# Patient Record
Sex: MALE | Race: WHITE | NOT HISPANIC OR LATINO | Employment: OTHER | URBAN - METROPOLITAN AREA
[De-identification: names, ages, dates, MRNs, and addresses within clinical notes are randomized per-mention and may not be internally consistent; named-entity substitution may affect disease eponyms.]

---

## 2017-02-06 ENCOUNTER — HISTORICAL (OUTPATIENT)
Dept: LAB | Facility: HOSPITAL | Age: 57
End: 2017-02-06

## 2017-05-08 ENCOUNTER — HISTORICAL (OUTPATIENT)
Dept: LAB | Facility: HOSPITAL | Age: 57
End: 2017-05-08

## 2017-05-08 LAB
ALBUMIN SERPL-MCNC: 4 GM/DL (ref 3.4–5)
ALBUMIN/GLOB SERPL: 1.2 RATIO (ref 1.1–2)
ALP SERPL-CCNC: 70 UNIT/L (ref 50–136)
ALT SERPL-CCNC: 27 UNIT/L (ref 12–78)
AST SERPL-CCNC: 26 UNIT/L (ref 10–37)
BILIRUB SERPL-MCNC: 0.4 MG/DL (ref 0.2–1)
BILIRUBIN DIRECT+TOT PNL SERPL-MCNC: 0.12 MG/DL (ref 0.05–0.2)
BILIRUBIN DIRECT+TOT PNL SERPL-MCNC: 0.28 MG/DL
BUN SERPL-MCNC: 16 MG/DL (ref 7–18)
CALCIUM SERPL-MCNC: 9.4 MG/DL (ref 8.5–10.1)
CHLORIDE SERPL-SCNC: 106 MMOL/L (ref 98–107)
CO2 SERPL-SCNC: 25.4 MMOL/L (ref 21–32)
CREAT SERPL-MCNC: 1.39 MG/DL (ref 0.7–1.3)
ERYTHROCYTE [SEDIMENTATION RATE] IN BLOOD: 15 MM/HR (ref 0–15)
GLOBULIN SER-MCNC: 3.4 GM/DL (ref 2.4–3.5)
GLUCOSE SERPL-MCNC: 152 MG/DL (ref 74–106)
POTASSIUM SERPL-SCNC: 5 MMOL/L (ref 3.5–5.1)
PROT SERPL-MCNC: 7.4 GM/DL (ref 6.4–8.2)
SODIUM SERPL-SCNC: 140 MMOL/L (ref 136–145)

## 2017-05-12 ENCOUNTER — HISTORICAL (OUTPATIENT)
Dept: LAB | Facility: HOSPITAL | Age: 57
End: 2017-05-12

## 2017-05-12 LAB
ALBUMIN SERPL-MCNC: 3.8 GM/DL (ref 3.4–5)
ALP SERPL-CCNC: 75 UNIT/L (ref 50–136)
ALT SERPL-CCNC: 25 UNIT/L (ref 12–78)
AST SERPL-CCNC: 24 UNIT/L (ref 10–37)
BILIRUB SERPL-MCNC: 0.3 MG/DL (ref 0.2–1)
BILIRUBIN DIRECT+TOT PNL SERPL-MCNC: 0.14 MG/DL (ref 0.05–0.2)
BILIRUBIN DIRECT+TOT PNL SERPL-MCNC: 0.16 MG/DL
CHOLEST SERPL-MCNC: 114 MG/DL (ref 50–200)
CHOLEST/HDLC SERPL: 4 {RATIO} (ref 0–5)
HDLC SERPL-MCNC: 31 MG/DL (ref 35–60)
LDLC SERPL CALC-MCNC: 46 MG/DL (ref 50–140)
PROT SERPL-MCNC: 7.1 GM/DL (ref 6.4–8.2)
TRIGL SERPL-MCNC: 187 MG/DL (ref 30–150)
VLDLC SERPL CALC-MCNC: 37 MG/DL

## 2017-05-19 ENCOUNTER — HISTORICAL (OUTPATIENT)
Dept: LAB | Facility: HOSPITAL | Age: 57
End: 2017-05-19

## 2017-05-19 LAB
ALBUMIN SERPL-MCNC: 3.8 GM/DL (ref 3.4–5)
ALBUMIN/GLOB SERPL: 1.1 RATIO (ref 1.1–2)
ALP SERPL-CCNC: 81 UNIT/L (ref 50–136)
ALT SERPL-CCNC: 26 UNIT/L (ref 12–78)
AST SERPL-CCNC: 24 UNIT/L (ref 10–37)
BILIRUB SERPL-MCNC: 0.2 MG/DL (ref 0.2–1)
BILIRUBIN DIRECT+TOT PNL SERPL-MCNC: 0.09 MG/DL
BILIRUBIN DIRECT+TOT PNL SERPL-MCNC: 0.11 MG/DL (ref 0.05–0.2)
BUN SERPL-MCNC: 14 MG/DL (ref 7–18)
CALCIUM SERPL-MCNC: 9.1 MG/DL (ref 8.5–10.1)
CHLORIDE SERPL-SCNC: 105 MMOL/L (ref 98–107)
CHOLEST SERPL-MCNC: 132 MG/DL (ref 50–200)
CHOLEST/HDLC SERPL: 4 {RATIO} (ref 0–5)
CO2 SERPL-SCNC: 27.7 MMOL/L (ref 21–32)
CREAT SERPL-MCNC: 1.11 MG/DL (ref 0.7–1.3)
EST. AVERAGE GLUCOSE BLD GHB EST-MCNC: 134 MG/DL
GLOBULIN SER-MCNC: 3.4 GM/DL (ref 2.4–3.5)
GLUCOSE SERPL-MCNC: 121 MG/DL (ref 74–106)
HBA1C MFR BLD: 6.3 % (ref 4.5–6.2)
HDLC SERPL-MCNC: 36 MG/DL (ref 35–60)
LDLC SERPL CALC-MCNC: 54 MG/DL (ref 50–140)
POTASSIUM SERPL-SCNC: 4.5 MMOL/L (ref 3.5–5.1)
PROT SERPL-MCNC: 7.2 GM/DL (ref 6.4–8.2)
SODIUM SERPL-SCNC: 140 MMOL/L (ref 136–145)
TRIGL SERPL-MCNC: 208 MG/DL (ref 30–150)
VLDLC SERPL CALC-MCNC: 42 MG/DL

## 2017-08-07 ENCOUNTER — HISTORICAL (OUTPATIENT)
Dept: LAB | Facility: HOSPITAL | Age: 57
End: 2017-08-07

## 2017-08-07 LAB
ALBUMIN SERPL-MCNC: 3.7 GM/DL (ref 3.4–5)
ALBUMIN/GLOB SERPL: 1.2 RATIO (ref 1.1–2)
ALP SERPL-CCNC: 69 UNIT/L (ref 50–136)
ALT SERPL-CCNC: 30 UNIT/L (ref 12–78)
AST SERPL-CCNC: 17 UNIT/L (ref 10–37)
BILIRUB SERPL-MCNC: 0.3 MG/DL (ref 0.2–1)
BILIRUBIN DIRECT+TOT PNL SERPL-MCNC: 0.1 MG/DL (ref 0.05–0.2)
BILIRUBIN DIRECT+TOT PNL SERPL-MCNC: 0.2 MG/DL
BUN SERPL-MCNC: 20 MG/DL (ref 7–18)
CALCIUM SERPL-MCNC: 9.1 MG/DL (ref 8.5–10.1)
CHLORIDE SERPL-SCNC: 102 MMOL/L (ref 98–107)
CO2 SERPL-SCNC: 25.1 MMOL/L (ref 21–32)
CREAT SERPL-MCNC: 1.45 MG/DL (ref 0.7–1.3)
ERYTHROCYTE [SEDIMENTATION RATE] IN BLOOD: 12 MM/HR (ref 0–15)
GLOBULIN SER-MCNC: 3.2 GM/DL (ref 2.4–3.5)
GLUCOSE SERPL-MCNC: 119 MG/DL (ref 74–106)
POTASSIUM SERPL-SCNC: 4.6 MMOL/L (ref 3.5–5.1)
PROT SERPL-MCNC: 6.9 GM/DL (ref 6.4–8.2)
SODIUM SERPL-SCNC: 139 MMOL/L (ref 136–145)

## 2017-11-06 ENCOUNTER — HISTORICAL (OUTPATIENT)
Dept: LAB | Facility: HOSPITAL | Age: 57
End: 2017-11-06

## 2017-11-06 LAB
ALBUMIN SERPL-MCNC: 3.9 GM/DL (ref 3.4–5)
ALBUMIN/GLOB SERPL: 1.3 RATIO (ref 1.1–2)
ALP SERPL-CCNC: 62 UNIT/L (ref 50–136)
ALT SERPL-CCNC: 25 UNIT/L (ref 12–78)
AST SERPL-CCNC: 24 UNIT/L (ref 10–37)
BILIRUB SERPL-MCNC: 0.3 MG/DL (ref 0.2–1)
BILIRUBIN DIRECT+TOT PNL SERPL-MCNC: 0.07 MG/DL (ref 0.05–0.2)
BILIRUBIN DIRECT+TOT PNL SERPL-MCNC: 0.19 MG/DL
BUN SERPL-MCNC: 17 MG/DL (ref 7–18)
CALCIUM SERPL-MCNC: 8.7 MG/DL (ref 8.5–10.1)
CHLORIDE SERPL-SCNC: 105 MMOL/L (ref 98–107)
CO2 SERPL-SCNC: 25.5 MMOL/L (ref 21–32)
CREAT SERPL-MCNC: 1.2 MG/DL (ref 0.7–1.3)
ERYTHROCYTE [SEDIMENTATION RATE] IN BLOOD: 13 MM/HR (ref 0–15)
GLOBULIN SER-MCNC: 3.1 GM/DL (ref 2.4–3.5)
GLUCOSE SERPL-MCNC: 110 MG/DL (ref 74–106)
POTASSIUM SERPL-SCNC: 5 MMOL/L (ref 3.5–5.1)
PROT SERPL-MCNC: 7 GM/DL (ref 6.4–8.2)
SODIUM SERPL-SCNC: 140 MMOL/L (ref 136–145)

## 2018-02-05 ENCOUNTER — HISTORICAL (OUTPATIENT)
Dept: LAB | Facility: HOSPITAL | Age: 58
End: 2018-02-05

## 2018-02-05 LAB
ALBUMIN SERPL-MCNC: 3.8 GM/DL (ref 3.4–5)
ALBUMIN/GLOB SERPL: 1.1 RATIO (ref 1.1–2)
ALP SERPL-CCNC: 65 UNIT/L (ref 46–116)
ALT SERPL-CCNC: 25 UNIT/L (ref 12–78)
AST SERPL-CCNC: 21 UNIT/L (ref 10–37)
BILIRUB SERPL-MCNC: 0.2 MG/DL (ref 0.2–1)
BILIRUBIN DIRECT+TOT PNL SERPL-MCNC: 0.08 MG/DL (ref 0–0.2)
BILIRUBIN DIRECT+TOT PNL SERPL-MCNC: 0.16 MG/DL
BUN SERPL-MCNC: 16 MG/DL (ref 7–18)
CALCIUM SERPL-MCNC: 9.4 MG/DL (ref 8.5–10.1)
CHLORIDE SERPL-SCNC: 102 MMOL/L (ref 98–107)
CO2 SERPL-SCNC: 27.2 MMOL/L (ref 21–32)
CREAT SERPL-MCNC: 1.24 MG/DL (ref 0.7–1.3)
CRP SERPL-MCNC: <0.3 MG/DL
ERYTHROCYTE [SEDIMENTATION RATE] IN BLOOD: 15 MM/HR (ref 0–15)
GLOBULIN SER-MCNC: 3.4 GM/DL (ref 2.4–3.5)
GLUCOSE SERPL-MCNC: 127 MG/DL (ref 74–106)
POTASSIUM SERPL-SCNC: 5 MMOL/L (ref 3.5–5.1)
PROT SERPL-MCNC: 7.2 GM/DL (ref 6.4–8.2)
SODIUM SERPL-SCNC: 138 MMOL/L (ref 136–145)

## 2018-05-07 ENCOUNTER — HISTORICAL (OUTPATIENT)
Dept: LAB | Facility: HOSPITAL | Age: 58
End: 2018-05-07

## 2018-05-07 LAB
ALBUMIN SERPL-MCNC: 3.6 GM/DL (ref 3.4–5)
ALBUMIN/GLOB SERPL: 1 RATIO (ref 1.1–2)
ALP SERPL-CCNC: 63 UNIT/L (ref 46–116)
ALT SERPL-CCNC: 28 UNIT/L (ref 12–78)
AST SERPL-CCNC: 18 UNIT/L (ref 10–37)
BILIRUB SERPL-MCNC: 0.4 MG/DL (ref 0.2–1)
BILIRUBIN DIRECT+TOT PNL SERPL-MCNC: 0.09 MG/DL (ref 0–0.2)
BILIRUBIN DIRECT+TOT PNL SERPL-MCNC: 0.31 MG/DL
BUN SERPL-MCNC: 18 MG/DL (ref 7–18)
CALCIUM SERPL-MCNC: 9.2 MG/DL (ref 8.5–10.1)
CHLORIDE SERPL-SCNC: 103 MMOL/L (ref 98–107)
CO2 SERPL-SCNC: 25.6 MMOL/L (ref 21–32)
CREAT SERPL-MCNC: 1.06 MG/DL (ref 0.7–1.3)
CRP SERPL-MCNC: <0.3 MG/DL
ERYTHROCYTE [SEDIMENTATION RATE] IN BLOOD: 12 MM/HR (ref 0–15)
GLOBULIN SER-MCNC: 3.5 GM/DL (ref 2.4–3.5)
GLUCOSE SERPL-MCNC: 128 MG/DL (ref 74–106)
POTASSIUM SERPL-SCNC: 4.4 MMOL/L (ref 3.5–5.1)
PROT SERPL-MCNC: 7.1 GM/DL (ref 6.4–8.2)
SODIUM SERPL-SCNC: 139 MMOL/L (ref 136–145)

## 2018-05-29 ENCOUNTER — HISTORICAL (OUTPATIENT)
Dept: LAB | Facility: HOSPITAL | Age: 58
End: 2018-05-29

## 2018-05-29 LAB
ALBUMIN SERPL-MCNC: 3.6 GM/DL (ref 3.4–5)
ALP SERPL-CCNC: 85 UNIT/L (ref 46–116)
ALT SERPL-CCNC: 29 UNIT/L (ref 12–78)
AST SERPL-CCNC: 22 UNIT/L (ref 10–37)
BILIRUB SERPL-MCNC: 0.3 MG/DL (ref 0.2–1)
BILIRUBIN DIRECT+TOT PNL SERPL-MCNC: 0.08 MG/DL (ref 0–0.2)
BILIRUBIN DIRECT+TOT PNL SERPL-MCNC: 0.22 MG/DL
CHOLEST SERPL-MCNC: 136 MG/DL (ref 50–200)
CHOLEST/HDLC SERPL: 3 {RATIO} (ref 0–5)
HDLC SERPL-MCNC: 46 MG/DL (ref 35–60)
LDLC SERPL CALC-MCNC: 43 MG/DL (ref 50–140)
PROT SERPL-MCNC: 7.3 GM/DL (ref 6.4–8.2)
TRIGL SERPL-MCNC: 237 MG/DL (ref 30–150)
VLDLC SERPL CALC-MCNC: 47 MG/DL

## 2018-08-09 ENCOUNTER — HISTORICAL (OUTPATIENT)
Dept: LAB | Facility: HOSPITAL | Age: 58
End: 2018-08-09

## 2018-08-09 LAB
ALBUMIN SERPL-MCNC: 3.5 GM/DL (ref 3.4–5)
ALBUMIN/GLOB SERPL: 1 RATIO (ref 1.1–2)
ALP SERPL-CCNC: 66 UNIT/L (ref 46–116)
ALT SERPL-CCNC: 41 UNIT/L (ref 12–78)
AST SERPL-CCNC: 41 UNIT/L (ref 10–37)
BILIRUB SERPL-MCNC: 0.4 MG/DL (ref 0.2–1)
BILIRUBIN DIRECT+TOT PNL SERPL-MCNC: 0.12 MG/DL (ref 0–0.2)
BILIRUBIN DIRECT+TOT PNL SERPL-MCNC: 0.28 MG/DL
BUN SERPL-MCNC: 8 MG/DL (ref 7–18)
CALCIUM SERPL-MCNC: 9.4 MG/DL (ref 8.5–10.1)
CHLORIDE SERPL-SCNC: 104 MMOL/L (ref 98–107)
CO2 SERPL-SCNC: 25.5 MMOL/L (ref 21–32)
CREAT SERPL-MCNC: 1.11 MG/DL (ref 0.7–1.3)
CRP SERPL-MCNC: <0.3 MG/DL
ERYTHROCYTE [SEDIMENTATION RATE] IN BLOOD: 17 MM/HR (ref 0–15)
GLOBULIN SER-MCNC: 3.6 GM/DL (ref 2.4–3.5)
GLUCOSE SERPL-MCNC: 156 MG/DL (ref 74–106)
POTASSIUM SERPL-SCNC: 4.4 MMOL/L (ref 3.5–5.1)
PROT SERPL-MCNC: 7.1 GM/DL (ref 6.4–8.2)
SODIUM SERPL-SCNC: 141 MMOL/L (ref 136–145)

## 2018-11-12 ENCOUNTER — HISTORICAL (OUTPATIENT)
Dept: LAB | Facility: HOSPITAL | Age: 58
End: 2018-11-12

## 2018-11-12 LAB
ALBUMIN SERPL-MCNC: 3.2 GM/DL (ref 3.4–5)
ALBUMIN/GLOB SERPL: 0.9 RATIO (ref 1.1–2)
ALP SERPL-CCNC: 100 UNIT/L (ref 46–116)
ALT SERPL-CCNC: 33 UNIT/L (ref 12–78)
AST SERPL-CCNC: 29 UNIT/L (ref 10–37)
BILIRUB SERPL-MCNC: 0.3 MG/DL (ref 0.2–1)
BILIRUBIN DIRECT+TOT PNL SERPL-MCNC: 0.11 MG/DL (ref 0–0.2)
BILIRUBIN DIRECT+TOT PNL SERPL-MCNC: 0.19 MG/DL
BUN SERPL-MCNC: 20 MG/DL (ref 7–18)
CALCIUM SERPL-MCNC: 9.3 MG/DL (ref 8.5–10.1)
CHLORIDE SERPL-SCNC: 102 MMOL/L (ref 98–107)
CO2 SERPL-SCNC: 29 MMOL/L (ref 21–32)
CREAT SERPL-MCNC: 1.24 MG/DL (ref 0.7–1.3)
CRP SERPL-MCNC: <0.3 MG/DL
ERYTHROCYTE [SEDIMENTATION RATE] IN BLOOD: 22 MM/HR (ref 0–20)
GLOBULIN SER-MCNC: 3.6 GM/DL (ref 2.4–3.5)
GLUCOSE SERPL-MCNC: 104 MG/DL (ref 74–106)
POTASSIUM SERPL-SCNC: 5.4 MMOL/L (ref 3.5–5.1)
PROT SERPL-MCNC: 6.8 GM/DL (ref 6.4–8.2)
SODIUM SERPL-SCNC: 139 MMOL/L (ref 136–145)

## 2019-02-15 ENCOUNTER — HISTORICAL (OUTPATIENT)
Dept: LAB | Facility: HOSPITAL | Age: 59
End: 2019-02-15

## 2019-02-15 LAB
ALBUMIN SERPL-MCNC: 3.6 GM/DL (ref 3.4–5)
ALBUMIN/GLOB SERPL: 1.1 RATIO (ref 1.1–2)
ALP SERPL-CCNC: 78 UNIT/L (ref 46–116)
ALT SERPL-CCNC: 52 UNIT/L (ref 12–78)
AST SERPL-CCNC: 65 UNIT/L (ref 10–37)
BILIRUB SERPL-MCNC: 0.4 MG/DL (ref 0.2–1)
BILIRUBIN DIRECT+TOT PNL SERPL-MCNC: 0.11 MG/DL (ref 0–0.2)
BILIRUBIN DIRECT+TOT PNL SERPL-MCNC: 0.29 MG/DL
BUN SERPL-MCNC: 20 MG/DL (ref 7–18)
CALCIUM SERPL-MCNC: 8.8 MG/DL (ref 8.5–10.1)
CHLORIDE SERPL-SCNC: 103 MMOL/L (ref 98–107)
CO2 SERPL-SCNC: 31.7 MMOL/L (ref 21–32)
CREAT SERPL-MCNC: 1.08 MG/DL (ref 0.7–1.3)
CRP SERPL-MCNC: <0.3 MG/DL
ERYTHROCYTE [SEDIMENTATION RATE] IN BLOOD: 13 MM/HR (ref 0–15)
GLOBULIN SER-MCNC: 3.4 GM/DL (ref 2.4–3.5)
GLUCOSE SERPL-MCNC: 136 MG/DL (ref 74–106)
POTASSIUM SERPL-SCNC: 4.6 MMOL/L (ref 3.5–5.1)
PROT SERPL-MCNC: 7 GM/DL (ref 6.4–8.2)
SODIUM SERPL-SCNC: 139 MMOL/L (ref 136–145)

## 2019-05-30 ENCOUNTER — HISTORICAL (OUTPATIENT)
Dept: LAB | Facility: HOSPITAL | Age: 59
End: 2019-05-30

## 2019-05-30 LAB
ALBUMIN SERPL-MCNC: 3.2 GM/DL (ref 3.4–5)
ALP SERPL-CCNC: 71 UNIT/L (ref 46–116)
ALT SERPL-CCNC: 30 UNIT/L (ref 12–78)
AST SERPL-CCNC: 24 UNIT/L (ref 10–37)
BILIRUB SERPL-MCNC: 0.4 MG/DL (ref 0.2–1)
BILIRUBIN DIRECT+TOT PNL SERPL-MCNC: 0.13 MG/DL (ref 0–0.2)
BILIRUBIN DIRECT+TOT PNL SERPL-MCNC: 0.27 MG/DL
CHOLEST SERPL-MCNC: 105 MG/DL (ref 50–200)
CHOLEST/HDLC SERPL: 3 {RATIO} (ref 0–5)
CRP SERPL-MCNC: <0.3 MG/DL
ERYTHROCYTE [SEDIMENTATION RATE] IN BLOOD: 21 MM/HR (ref 0–20)
HDLC SERPL-MCNC: 39 MG/DL (ref 35–60)
LDLC SERPL CALC-MCNC: 36 MG/DL (ref 50–140)
PROT SERPL-MCNC: 6.4 GM/DL (ref 6.4–8.2)
TRIGL SERPL-MCNC: 151 MG/DL (ref 30–150)
VLDLC SERPL CALC-MCNC: 30 MG/DL

## 2019-08-01 ENCOUNTER — HISTORICAL (OUTPATIENT)
Dept: LAB | Facility: HOSPITAL | Age: 59
End: 2019-08-01

## 2019-08-01 LAB
ABS NEUT (OLG): 3.2
ALBUMIN SERPL-MCNC: 3 GM/DL (ref 3.4–5)
ALBUMIN/GLOB SERPL: 0.9 RATIO (ref 1.1–2)
ALP SERPL-CCNC: 65 UNIT/L (ref 46–116)
ALT SERPL-CCNC: 22 UNIT/L (ref 12–78)
AST SERPL-CCNC: 46 UNIT/L (ref 10–37)
BASOPHILS # BLD AUTO: 0.01 X10(3)/MCL
BASOPHILS NFR BLD AUTO: 0.2 %
BILIRUB SERPL-MCNC: 0.3 MG/DL (ref 0.2–1)
BILIRUBIN DIRECT+TOT PNL SERPL-MCNC: 0.13 MG/DL (ref 0–0.2)
BILIRUBIN DIRECT+TOT PNL SERPL-MCNC: 0.17 MG/DL
BUN SERPL-MCNC: 8 MG/DL (ref 7–18)
CALCIUM SERPL-MCNC: 8.8 MG/DL (ref 8.5–10.1)
CHLORIDE SERPL-SCNC: 106 MMOL/L (ref 98–107)
CO2 SERPL-SCNC: 28.9 MMOL/L (ref 21–32)
CREAT SERPL-MCNC: 1.17 MG/DL (ref 0.7–1.3)
EOSINOPHIL # BLD AUTO: 0.4 X10(3)/MCL
EOSINOPHIL NFR BLD AUTO: 7.1 %
ERYTHROCYTE [DISTWIDTH] IN BLOOD BY AUTOMATED COUNT: 16 %
EST. AVERAGE GLUCOSE BLD GHB EST-MCNC: 131 MG/DL
GLOBULIN SER-MCNC: 3.4 GM/DL (ref 2.4–3.5)
GLUCOSE SERPL-MCNC: 99 MG/DL (ref 74–106)
HBA1C MFR BLD: 6.2 % (ref 4.5–6.2)
HCT VFR BLD AUTO: 35.8 % (ref 39–49)
HGB BLD-MCNC: 11.2 GM/DL (ref 12.6–16.6)
IMM GRANULOCYTES # BLD AUTO: 0.02 10*3/UL (ref 0–0.1)
IMM GRANULOCYTES NFR BLD AUTO: 0.4 % (ref 0–1)
LYMPHOCYTES # BLD AUTO: 1.32 X10(3)/MCL
LYMPHOCYTES NFR BLD AUTO: 23.3 %
MCH RBC QN AUTO: 29.2 PG (ref 27–33)
MCHC RBC AUTO-ENTMCNC: 31.3 GM/DL (ref 32–35)
MCV RBC AUTO: 93.5 FL (ref 84–97)
MONOCYTES # BLD AUTO: 0.71 X10(3)/MCL
MONOCYTES NFR BLD AUTO: 12.5 %
NEUTROPHILS # BLD AUTO: 3.2 X10(3)/MCL
NEUTROPHILS NFR BLD AUTO: 56.5 %
PLATELET # BLD AUTO: 167 X10(3)/MCL (ref 140–450)
PMV BLD AUTO: 10 FL
POTASSIUM SERPL-SCNC: 4.4 MMOL/L (ref 3.5–5.1)
PROT SERPL-MCNC: 6.4 GM/DL (ref 6.4–8.2)
RBC # BLD AUTO: 3.83 X10(6)/MCL (ref 4.3–5.6)
SODIUM SERPL-SCNC: 143 MMOL/L (ref 136–145)
WBC # SPEC AUTO: 5.66 X10(3)/MCL (ref 3.4–9.2)

## 2019-08-06 ENCOUNTER — HISTORICAL (OUTPATIENT)
Dept: RADIOLOGY | Facility: HOSPITAL | Age: 59
End: 2019-08-06

## 2019-09-25 ENCOUNTER — HISTORICAL (OUTPATIENT)
Dept: LAB | Facility: HOSPITAL | Age: 59
End: 2019-09-25

## 2019-09-25 LAB
CRP SERPL-MCNC: <0.3 MG/DL
ERYTHROCYTE [SEDIMENTATION RATE] IN BLOOD: 25 MM/HR (ref 0–20)

## 2019-12-11 ENCOUNTER — HISTORICAL (OUTPATIENT)
Dept: ADMINISTRATIVE | Facility: HOSPITAL | Age: 59
End: 2019-12-11

## 2020-01-09 ENCOUNTER — HISTORICAL (OUTPATIENT)
Dept: LAB | Facility: HOSPITAL | Age: 60
End: 2020-01-09

## 2020-01-09 LAB
ALBUMIN SERPL-MCNC: 3.7 GM/DL (ref 3.2–4.6)
ALP SERPL-CCNC: 78 UNIT/L (ref 40–150)
ALT SERPL-CCNC: 25 UNIT/L (ref 0–55)
AST SERPL-CCNC: 44 UNIT/L (ref 5–34)
BILIRUB SERPL-MCNC: 0.4 MG/DL
BILIRUBIN DIRECT+TOT PNL SERPL-MCNC: 0.2 MG/DL
BILIRUBIN DIRECT+TOT PNL SERPL-MCNC: 0.2 MG/DL (ref 0–0.5)
CHOLEST SERPL-MCNC: 204 MG/DL
CHOLEST/HDLC SERPL: 6 {RATIO} (ref 0–5)
CRP SERPL-MCNC: 0.4 MG/DL
ERYTHROCYTE [SEDIMENTATION RATE] IN BLOOD: 22 MM/HR (ref 0–20)
HDLC SERPL-MCNC: 34 MG/DL
LDLC SERPL CALC-MCNC: 115 MG/DL (ref 50–140)
PROT SERPL-MCNC: 7.3 GM/DL (ref 5.8–7.6)
TRIGL SERPL-MCNC: 276 MG/DL (ref 34–140)
VLDLC SERPL CALC-MCNC: 55 MG/DL

## 2020-01-14 ENCOUNTER — HISTORICAL (OUTPATIENT)
Dept: LAB | Facility: HOSPITAL | Age: 60
End: 2020-01-14

## 2020-01-14 LAB
ABS NEUT (OLG): 2.73
ALBUMIN SERPL-MCNC: 3.7 GM/DL (ref 3.2–4.6)
ALBUMIN/GLOB SERPL: 1.1 RATIO (ref 1.1–2)
ALP SERPL-CCNC: 79 UNIT/L (ref 40–150)
ALT SERPL-CCNC: 28 UNIT/L (ref 0–55)
AST SERPL-CCNC: 42 UNIT/L (ref 5–34)
BASOPHILS # BLD AUTO: 0.02 X10(3)/MCL
BASOPHILS NFR BLD AUTO: 0.4 %
BILIRUB SERPL-MCNC: 0.3 MG/DL
BILIRUBIN DIRECT+TOT PNL SERPL-MCNC: 0.1 MG/DL (ref 0–0.5)
BILIRUBIN DIRECT+TOT PNL SERPL-MCNC: 0.2 MG/DL
BUN SERPL-MCNC: 19 MG/DL (ref 8.4–25.7)
CALCIUM SERPL-MCNC: 9.4 MG/DL (ref 8.8–10)
CHLORIDE SERPL-SCNC: 107 MMOL/L (ref 98–107)
CO2 SERPL-SCNC: 24 MEQ/L (ref 23–31)
CREAT SERPL-MCNC: 1.22 MG/DL (ref 0.73–1.18)
EOSINOPHIL # BLD AUTO: 0.28 X10(3)/MCL
EOSINOPHIL NFR BLD AUTO: 5.7 %
ERYTHROCYTE [DISTWIDTH] IN BLOOD BY AUTOMATED COUNT: 16 %
GLOBULIN SER-MCNC: 3.4 GM/DL (ref 2.4–3.5)
GLUCOSE SERPL-MCNC: 134 MG/DL (ref 82–115)
HCT VFR BLD AUTO: 38.5 % (ref 39–49)
HGB BLD-MCNC: 11.6 GM/DL (ref 12.6–16.6)
IMM GRANULOCYTES # BLD AUTO: 0.01 10*3/UL (ref 0–0.1)
IMM GRANULOCYTES NFR BLD AUTO: 0.2 % (ref 0–1)
LYMPHOCYTES # BLD AUTO: 1.28 X10(3)/MCL
LYMPHOCYTES NFR BLD AUTO: 26 %
MCH RBC QN AUTO: 26.2 PG (ref 27–33)
MCHC RBC AUTO-ENTMCNC: 30.1 GM/DL (ref 32–35)
MCV RBC AUTO: 86.9 FL (ref 84–97)
MONOCYTES # BLD AUTO: 0.61 X10(3)/MCL
MONOCYTES NFR BLD AUTO: 12.4 %
NEUTROPHILS # BLD AUTO: 2.73 X10(3)/MCL
NEUTROPHILS NFR BLD AUTO: 55.3 %
PLATELET # BLD AUTO: 181 X10(3)/MCL (ref 140–450)
PMV BLD AUTO: 10 FL
POTASSIUM SERPL-SCNC: 4.3 MMOL/L (ref 3.5–5.1)
PROT SERPL-MCNC: 7.1 GM/DL (ref 5.8–7.6)
PSA SERPL-MCNC: 0.64 NG/ML
RBC # BLD AUTO: 4.43 X10(6)/MCL (ref 4.3–5.6)
SODIUM SERPL-SCNC: 143 MMOL/L (ref 136–145)
TSH SERPL-ACNC: 4.18 UIU/ML (ref 0.35–4.94)
WBC # SPEC AUTO: 4.93 X10(3)/MCL (ref 3.4–9.2)

## 2020-01-17 ENCOUNTER — HISTORICAL (OUTPATIENT)
Dept: LAB | Facility: HOSPITAL | Age: 60
End: 2020-01-17

## 2020-01-17 LAB
EST. AVERAGE GLUCOSE BLD GHB EST-MCNC: 160 MG/DL
HBA1C MFR BLD: 7.2 % (ref 4–6)

## 2020-02-18 ENCOUNTER — HISTORICAL (OUTPATIENT)
Dept: ADMINISTRATIVE | Facility: HOSPITAL | Age: 60
End: 2020-02-18

## 2020-03-17 ENCOUNTER — HISTORICAL (OUTPATIENT)
Dept: LAB | Facility: HOSPITAL | Age: 60
End: 2020-03-17

## 2020-03-17 LAB
BUN SERPL-MCNC: 18 MG/DL (ref 7–18)
CALCIUM SERPL-MCNC: 9 MG/DL (ref 8.5–10.1)
CHLORIDE SERPL-SCNC: 105 MMOL/L (ref 98–107)
CO2 SERPL-SCNC: 27 MMOL/L (ref 21–32)
CREAT SERPL-MCNC: 1.18 MG/DL (ref 0.7–1.3)
CREAT/UREA NIT SERPL: 15
EST. AVERAGE GLUCOSE BLD GHB EST-MCNC: 177 MG/DL
GLUCOSE SERPL-MCNC: 167 MG/DL (ref 74–106)
HBA1C MFR BLD: 7.8 % (ref 4.2–6.3)
POTASSIUM SERPL-SCNC: 4.4 MMOL/L (ref 3.5–5.1)
SODIUM SERPL-SCNC: 138 MMOL/L (ref 136–145)

## 2020-04-13 ENCOUNTER — HISTORICAL (OUTPATIENT)
Dept: LAB | Facility: HOSPITAL | Age: 60
End: 2020-04-13

## 2020-04-13 LAB
CRP SERPL-MCNC: <0.3 MG/DL
ERYTHROCYTE [SEDIMENTATION RATE] IN BLOOD: 23 MM/HR (ref 0–20)

## 2020-05-01 ENCOUNTER — HISTORICAL (OUTPATIENT)
Dept: LAB | Facility: HOSPITAL | Age: 60
End: 2020-05-01

## 2020-05-01 LAB
ALBUMIN SERPL-MCNC: 3.8 GM/DL (ref 3.4–4.8)
ALP SERPL-CCNC: 81 UNIT/L (ref 40–150)
ALT SERPL-CCNC: 23 UNIT/L (ref 0–55)
AST SERPL-CCNC: 35 UNIT/L (ref 5–34)
BILIRUB SERPL-MCNC: 0.4 MG/DL
BILIRUBIN DIRECT+TOT PNL SERPL-MCNC: 0.2 MG/DL
BILIRUBIN DIRECT+TOT PNL SERPL-MCNC: 0.2 MG/DL (ref 0–0.5)
CHOLEST SERPL-MCNC: 136 MG/DL
CHOLEST/HDLC SERPL: 4 {RATIO} (ref 0–5)
HDLC SERPL-MCNC: 34 MG/DL (ref 35–60)
LDLC SERPL CALC-MCNC: 64 MG/DL (ref 50–140)
PROT SERPL-MCNC: 7.2 GM/DL (ref 5.8–7.6)
TRIGL SERPL-MCNC: 188 MG/DL (ref 34–140)
VLDLC SERPL CALC-MCNC: 38 MG/DL

## 2020-07-01 LAB
BUN SERPL-MCNC: 12.9 MG/DL (ref 8.4–25.7)
CALCIUM SERPL-MCNC: 9.8 MG/DL (ref 8.8–10)
CHLORIDE SERPL-SCNC: 102 MMOL/L (ref 98–107)
CO2 SERPL-SCNC: 26 MMOL/L (ref 23–31)
CREAT SERPL-MCNC: 1.11 MG/DL (ref 0.72–1.25)
CREAT/UREA NIT SERPL: 12
EST. AVERAGE GLUCOSE BLD GHB EST-MCNC: 159.9 MG/DL
GLUCOSE SERPL-MCNC: 119 MG/DL (ref 82–115)
HBA1C MFR BLD: 7.2 %
POTASSIUM SERPL-SCNC: 4.1 MMOL/L (ref 3.5–5.1)
SODIUM SERPL-SCNC: 140 MMOL/L (ref 136–145)

## 2020-07-06 ENCOUNTER — HOSPITAL ENCOUNTER (OUTPATIENT)
Dept: ORTHOPEDICS | Facility: HOSPITAL | Age: 60
End: 2020-07-07

## 2020-07-06 LAB — SARS-COV-2 RNA RESP QL NAA+PROBE: NOT DETECTED

## 2020-07-07 LAB — GRAM STN SPEC: NORMAL

## 2020-07-09 LAB — FINAL CULTURE: NORMAL

## 2020-07-11 LAB — FINAL CULTURE: NORMAL

## 2020-07-14 ENCOUNTER — HISTORICAL (OUTPATIENT)
Dept: ADMINISTRATIVE | Facility: HOSPITAL | Age: 60
End: 2020-07-14

## 2020-07-31 ENCOUNTER — HISTORICAL (OUTPATIENT)
Dept: LAB | Facility: HOSPITAL | Age: 60
End: 2020-07-31

## 2020-07-31 LAB
ABS NEUT (OLG): 2.91
ALBUMIN SERPL-MCNC: 3.6 GM/DL (ref 3.4–4.8)
ALBUMIN/GLOB SERPL: 1 RATIO (ref 1.1–2)
ALP SERPL-CCNC: 102 UNIT/L (ref 40–150)
ALT SERPL-CCNC: 20 UNIT/L (ref 0–55)
AST SERPL-CCNC: 33 UNIT/L (ref 5–34)
BASOPHILS # BLD AUTO: 0.02 X10(3)/MCL
BASOPHILS NFR BLD AUTO: 0.4 %
BILIRUB SERPL-MCNC: 0.5 MG/DL
BILIRUBIN DIRECT+TOT PNL SERPL-MCNC: 0.2 MG/DL
BILIRUBIN DIRECT+TOT PNL SERPL-MCNC: 0.3 MG/DL (ref 0–0.5)
BUN SERPL-MCNC: 17 MG/DL (ref 8.4–25.7)
CALCIUM SERPL-MCNC: 9.2 MG/DL (ref 8.8–10)
CHLORIDE SERPL-SCNC: 103 MMOL/L (ref 98–107)
CO2 SERPL-SCNC: 28 MEQ/L (ref 23–31)
CREAT SERPL-MCNC: 1.25 MG/DL (ref 0.73–1.18)
CRP SERPL-MCNC: <0.3 MG/DL
EOSINOPHIL # BLD AUTO: 0.29 X10(3)/MCL
EOSINOPHIL NFR BLD AUTO: 5.8 %
ERYTHROCYTE [DISTWIDTH] IN BLOOD BY AUTOMATED COUNT: 19 %
ERYTHROCYTE [SEDIMENTATION RATE] IN BLOOD: 38 MM/HR (ref 0–20)
GLOBULIN SER-MCNC: 3.5 GM/DL (ref 2.4–3.5)
GLUCOSE SERPL-MCNC: 132 MG/DL (ref 82–115)
HCT VFR BLD AUTO: 36 % (ref 39–49)
HGB BLD-MCNC: 10.6 GM/DL (ref 12.6–16.6)
IMM GRANULOCYTES # BLD AUTO: 0 10*3/UL (ref 0–0.1)
IMM GRANULOCYTES NFR BLD AUTO: 0 % (ref 0–1)
LYMPHOCYTES # BLD AUTO: 1.16 X10(3)/MCL
LYMPHOCYTES NFR BLD AUTO: 23.3 %
MCH RBC QN AUTO: 25 PG (ref 27–33)
MCHC RBC AUTO-ENTMCNC: 29.4 GM/DL (ref 32–35)
MCV RBC AUTO: 84.9 FL (ref 84–97)
MONOCYTES # BLD AUTO: 0.59 X10(3)/MCL
MONOCYTES NFR BLD AUTO: 11.9 %
NEUTROPHILS # BLD AUTO: 2.91 X10(3)/MCL
NEUTROPHILS NFR BLD AUTO: 58.6 %
PLATELET # BLD AUTO: 127 X10(3)/MCL (ref 140–450)
PMV BLD AUTO: 11 FL
POTASSIUM SERPL-SCNC: 4.2 MMOL/L (ref 3.5–5.1)
PROT SERPL-MCNC: 7.1 GM/DL (ref 5.8–7.6)
RBC # BLD AUTO: 4.24 X10(6)/MCL (ref 4.3–5.6)
SODIUM SERPL-SCNC: 142 MMOL/L (ref 136–145)
WBC # SPEC AUTO: 4.97 X10(3)/MCL (ref 3.4–9.2)

## 2020-08-03 LAB — FINAL CULTURE: NORMAL

## 2020-08-04 ENCOUNTER — HISTORICAL (OUTPATIENT)
Dept: ADMINISTRATIVE | Facility: HOSPITAL | Age: 60
End: 2020-08-04

## 2020-09-30 ENCOUNTER — HISTORICAL (OUTPATIENT)
Dept: ADMINISTRATIVE | Facility: HOSPITAL | Age: 60
End: 2020-09-30

## 2020-10-28 ENCOUNTER — HISTORICAL (OUTPATIENT)
Dept: ADMINISTRATIVE | Facility: HOSPITAL | Age: 60
End: 2020-10-28

## 2020-11-05 ENCOUNTER — HISTORICAL (OUTPATIENT)
Dept: LAB | Facility: HOSPITAL | Age: 60
End: 2020-11-05

## 2020-11-05 LAB
ABS NEUT (OLG): 2.63
ALBUMIN SERPL-MCNC: 3.7 GM/DL (ref 3.4–4.8)
ALBUMIN SERPL-MCNC: 3.7 GM/DL (ref 3.4–4.8)
ALBUMIN/GLOB SERPL: 1.1 RATIO (ref 1.1–2)
ALP SERPL-CCNC: 119 UNIT/L (ref 40–150)
ALP SERPL-CCNC: 120 UNIT/L (ref 40–150)
ALT SERPL-CCNC: 20 UNIT/L (ref 0–55)
ALT SERPL-CCNC: 21 UNIT/L (ref 0–55)
AST SERPL-CCNC: 33 UNIT/L (ref 5–34)
AST SERPL-CCNC: 36 UNIT/L (ref 5–34)
BASOPHILS # BLD AUTO: 0.02 X10(3)/MCL
BASOPHILS NFR BLD AUTO: 0.4 %
BILIRUB SERPL-MCNC: 0.4 MG/DL
BILIRUB SERPL-MCNC: 0.4 MG/DL
BILIRUBIN DIRECT+TOT PNL SERPL-MCNC: 0.2 MG/DL
BILIRUBIN DIRECT+TOT PNL SERPL-MCNC: 0.2 MG/DL
BILIRUBIN DIRECT+TOT PNL SERPL-MCNC: 0.2 MG/DL (ref 0–0.5)
BILIRUBIN DIRECT+TOT PNL SERPL-MCNC: 0.2 MG/DL (ref 0–0.5)
BUN SERPL-MCNC: 14 MG/DL (ref 8.4–25.7)
CALCIUM SERPL-MCNC: 9 MG/DL (ref 8.8–10)
CHLORIDE SERPL-SCNC: 101 MMOL/L (ref 98–107)
CHOLEST SERPL-MCNC: 132 MG/DL
CHOLEST/HDLC SERPL: 4 {RATIO} (ref 0–5)
CO2 SERPL-SCNC: 27 MEQ/L (ref 23–31)
CREAT SERPL-MCNC: 1.09 MG/DL (ref 0.73–1.18)
CRP SERPL-MCNC: 0.54 MG/DL
EOSINOPHIL # BLD AUTO: 0.36 X10(3)/MCL
EOSINOPHIL NFR BLD AUTO: 7.8 %
ERYTHROCYTE [DISTWIDTH] IN BLOOD BY AUTOMATED COUNT: 18 %
ERYTHROCYTE [SEDIMENTATION RATE] IN BLOOD: 23 MM/HR (ref 0–20)
GLOBULIN SER-MCNC: 3.5 GM/DL (ref 2.4–3.5)
GLUCOSE SERPL-MCNC: 130 MG/DL (ref 82–115)
HCT VFR BLD AUTO: 36.5 % (ref 39–49)
HDLC SERPL-MCNC: 35 MG/DL (ref 35–60)
HGB BLD-MCNC: 10.9 GM/DL (ref 12.6–16.6)
IMM GRANULOCYTES # BLD AUTO: 0.01 10*3/UL (ref 0–0.1)
IMM GRANULOCYTES NFR BLD AUTO: 0.2 % (ref 0–1)
LDLC SERPL CALC-MCNC: 66 MG/DL (ref 50–140)
LYMPHOCYTES # BLD AUTO: 1.1 X10(3)/MCL
LYMPHOCYTES NFR BLD AUTO: 23.7 %
MCH RBC QN AUTO: 25.6 PG (ref 27–33)
MCHC RBC AUTO-ENTMCNC: 29.9 GM/DL (ref 32–35)
MCV RBC AUTO: 85.7 FL (ref 84–97)
MONOCYTES # BLD AUTO: 0.52 X10(3)/MCL
MONOCYTES NFR BLD AUTO: 11.2 %
NEUTROPHILS # BLD AUTO: 2.63 X10(3)/MCL
NEUTROPHILS NFR BLD AUTO: 56.7 %
PLATELET # BLD AUTO: 175 X10(3)/MCL (ref 140–450)
PMV BLD AUTO: 11 FL
POTASSIUM SERPL-SCNC: 4.1 MMOL/L (ref 3.5–5.1)
PROT SERPL-MCNC: 7.2 GM/DL (ref 5.8–7.6)
PROT SERPL-MCNC: 7.5 GM/DL (ref 5.8–7.6)
RBC # BLD AUTO: 4.26 X10(6)/MCL (ref 4.3–5.6)
SODIUM SERPL-SCNC: 140 MMOL/L (ref 136–145)
TRIGL SERPL-MCNC: 154 MG/DL (ref 34–140)
VLDLC SERPL CALC-MCNC: 31 MG/DL
WBC # SPEC AUTO: 4.64 X10(3)/MCL (ref 3.4–9.2)

## 2020-11-24 ENCOUNTER — HISTORICAL (OUTPATIENT)
Dept: ADMINISTRATIVE | Facility: HOSPITAL | Age: 60
End: 2020-11-24

## 2020-12-01 ENCOUNTER — HISTORICAL (OUTPATIENT)
Dept: LAB | Facility: HOSPITAL | Age: 60
End: 2020-12-01

## 2020-12-01 LAB
ALBUMIN SERPL-MCNC: 3.7 GM/DL (ref 3.4–4.8)
ALBUMIN/GLOB SERPL: 1.1 RATIO (ref 1.1–2)
ALP SERPL-CCNC: 119 UNIT/L (ref 40–150)
ALT SERPL-CCNC: 20 UNIT/L (ref 0–55)
AST SERPL-CCNC: 28 UNIT/L (ref 5–34)
BILIRUB SERPL-MCNC: 0.4 MG/DL
BILIRUBIN DIRECT+TOT PNL SERPL-MCNC: 0.2 MG/DL
BILIRUBIN DIRECT+TOT PNL SERPL-MCNC: 0.2 MG/DL (ref 0–0.5)
BUN SERPL-MCNC: 15 MG/DL (ref 8.4–25.7)
CALCIUM SERPL-MCNC: 9.9 MG/DL (ref 8.8–10)
CHLORIDE SERPL-SCNC: 101 MMOL/L (ref 98–107)
CO2 SERPL-SCNC: 30 MEQ/L (ref 23–31)
CREAT SERPL-MCNC: 1.28 MG/DL (ref 0.73–1.18)
EST. AVERAGE GLUCOSE BLD GHB EST-MCNC: 146 MG/DL
GLOBULIN SER-MCNC: 3.5 GM/DL (ref 2.4–3.5)
GLUCOSE SERPL-MCNC: 132 MG/DL (ref 82–115)
HBA1C MFR BLD: 6.7 % (ref 4–6)
POTASSIUM SERPL-SCNC: 4.8 MMOL/L (ref 3.5–5.1)
PROT SERPL-MCNC: 7.2 GM/DL (ref 5.8–7.6)
SODIUM SERPL-SCNC: 141 MMOL/L (ref 136–145)
TSH SERPL-ACNC: 4.28 UIU/ML (ref 0.35–4.94)

## 2021-02-05 ENCOUNTER — HISTORICAL (OUTPATIENT)
Dept: LAB | Facility: HOSPITAL | Age: 61
End: 2021-02-05

## 2021-02-05 LAB
ABS NEUT (OLG): 2.45
ALBUMIN SERPL-MCNC: 3.9 GM/DL (ref 3.4–4.8)
ALBUMIN/GLOB SERPL: 1.1 RATIO (ref 1.1–2)
ALP SERPL-CCNC: 136 UNIT/L (ref 40–150)
ALT SERPL-CCNC: 19 UNIT/L (ref 0–55)
AST SERPL-CCNC: 31 UNIT/L (ref 5–34)
BASOPHILS # BLD AUTO: 0.01 X10(3)/MCL
BASOPHILS NFR BLD AUTO: 0.2 %
BILIRUB SERPL-MCNC: 0.5 MG/DL
BILIRUBIN DIRECT+TOT PNL SERPL-MCNC: 0.2 MG/DL (ref 0–0.5)
BILIRUBIN DIRECT+TOT PNL SERPL-MCNC: 0.3 MG/DL
BUN SERPL-MCNC: 18 MG/DL (ref 8.4–25.7)
CALCIUM SERPL-MCNC: 9.4 MG/DL (ref 8.8–10)
CHLORIDE SERPL-SCNC: 104 MMOL/L (ref 98–107)
CO2 SERPL-SCNC: 29 MEQ/L (ref 23–31)
CREAT SERPL-MCNC: 1.03 MG/DL (ref 0.73–1.18)
CRP SERPL-MCNC: 0.4 MG/DL
EOSINOPHIL # BLD AUTO: 0.26 X10(3)/MCL
EOSINOPHIL NFR BLD AUTO: 6 %
ERYTHROCYTE [DISTWIDTH] IN BLOOD BY AUTOMATED COUNT: 18 %
ERYTHROCYTE [SEDIMENTATION RATE] IN BLOOD: 34 MM/HR (ref 0–20)
GLOBULIN SER-MCNC: 3.7 GM/DL (ref 2.4–3.5)
GLUCOSE SERPL-MCNC: 152 MG/DL (ref 82–115)
HCT VFR BLD AUTO: 35.8 % (ref 39–49)
HGB BLD-MCNC: 10.4 GM/DL (ref 12.6–16.6)
IMM GRANULOCYTES # BLD AUTO: 0 10*3/UL (ref 0–0.1)
IMM GRANULOCYTES NFR BLD AUTO: 0 % (ref 0–1)
LYMPHOCYTES # BLD AUTO: 1.23 X10(3)/MCL
LYMPHOCYTES NFR BLD AUTO: 28.4 %
MCH RBC QN AUTO: 24.6 PG (ref 27–33)
MCHC RBC AUTO-ENTMCNC: 29.1 GM/DL (ref 32–35)
MCV RBC AUTO: 84.6 FL (ref 84–97)
MONOCYTES # BLD AUTO: 0.38 X10(3)/MCL
MONOCYTES NFR BLD AUTO: 8.8 %
NEUTROPHILS # BLD AUTO: 2.45 X10(3)/MCL
NEUTROPHILS NFR BLD AUTO: 56.6 %
PLATELET # BLD AUTO: 193 X10(3)/MCL (ref 140–450)
PMV BLD AUTO: 10 FL
POTASSIUM SERPL-SCNC: 4.2 MMOL/L (ref 3.5–5.1)
PROT SERPL-MCNC: 7.6 GM/DL (ref 5.8–7.6)
RBC # BLD AUTO: 4.23 X10(6)/MCL (ref 4.3–5.6)
SODIUM SERPL-SCNC: 143 MMOL/L (ref 136–145)
WBC # SPEC AUTO: 4.33 X10(3)/MCL (ref 3.4–9.2)

## 2021-03-17 ENCOUNTER — HISTORICAL (OUTPATIENT)
Dept: LAB | Facility: HOSPITAL | Age: 61
End: 2021-03-17

## 2021-03-17 LAB
ABS NEUT (OLG): 2.43
ALBUMIN SERPL-MCNC: 3.7 GM/DL (ref 3.4–4.8)
ALBUMIN/GLOB SERPL: 1.1 RATIO (ref 1.1–2)
ALP SERPL-CCNC: 119 UNIT/L (ref 40–150)
ALT SERPL-CCNC: 19 UNIT/L (ref 0–55)
AST SERPL-CCNC: 37 UNIT/L (ref 5–34)
BASOPHILS # BLD AUTO: 0.01 X10(3)/MCL
BASOPHILS NFR BLD AUTO: 0.2 %
BILIRUB SERPL-MCNC: 0.4 MG/DL
BILIRUBIN DIRECT+TOT PNL SERPL-MCNC: 0.2 MG/DL
BILIRUBIN DIRECT+TOT PNL SERPL-MCNC: 0.2 MG/DL (ref 0–0.5)
BUN SERPL-MCNC: 13 MG/DL (ref 9.8–20.1)
CALCIUM SERPL-MCNC: 9 MG/DL (ref 8.4–10.2)
CHLORIDE SERPL-SCNC: 102 MMOL/L (ref 98–107)
CO2 SERPL-SCNC: 30 MEQ/L (ref 23–31)
CREAT SERPL-MCNC: 0.91 MG/DL (ref 0.55–1.02)
CRP SERPL-MCNC: 0.25 MG/DL
EOSINOPHIL # BLD AUTO: 0.21 X10(3)/MCL
EOSINOPHIL NFR BLD AUTO: 4.8 %
ERYTHROCYTE [DISTWIDTH] IN BLOOD BY AUTOMATED COUNT: 18 %
ERYTHROCYTE [SEDIMENTATION RATE] IN BLOOD: 44 MM/HR (ref 0–20)
GLOBULIN SER-MCNC: 3.3 GM/DL (ref 2.4–3.5)
GLUCOSE SERPL-MCNC: 121 MG/DL (ref 82–115)
HCT VFR BLD AUTO: 34.3 % (ref 34–46)
HGB BLD-MCNC: 10.1 GM/DL (ref 11.3–15.4)
IMM GRANULOCYTES # BLD AUTO: 0.01 10*3/UL (ref 0–0.1)
IMM GRANULOCYTES NFR BLD AUTO: 0.2 % (ref 0–1)
LYMPHOCYTES # BLD AUTO: 1.27 X10(3)/MCL
LYMPHOCYTES NFR BLD AUTO: 29 %
MCH RBC QN AUTO: 24.8 PG (ref 27–33)
MCHC RBC AUTO-ENTMCNC: 29.4 GM/DL (ref 32–35)
MCV RBC AUTO: 84.1 FL (ref 81–97)
MONOCYTES # BLD AUTO: 0.45 X10(3)/MCL
MONOCYTES NFR BLD AUTO: 10.3 %
NEUTROPHILS # BLD AUTO: 2.43 X10(3)/MCL
NEUTROPHILS NFR BLD AUTO: 55.5 %
PLATELET # BLD AUTO: 182 X10(3)/MCL (ref 140–450)
PMV BLD AUTO: 10 FL
POTASSIUM SERPL-SCNC: 4.3 MMOL/L (ref 3.5–5.1)
PROT SERPL-MCNC: 7 GM/DL (ref 5.8–7.6)
RBC # BLD AUTO: 4.08 X10(6)/MCL (ref 3.9–5)
SODIUM SERPL-SCNC: 142 MMOL/L (ref 136–145)
WBC # SPEC AUTO: 4.38 X10(3)/MCL (ref 3.4–9.2)

## 2021-05-10 ENCOUNTER — HISTORICAL (OUTPATIENT)
Dept: LAB | Facility: HOSPITAL | Age: 61
End: 2021-05-10

## 2021-05-10 LAB
ABS NEUT (OLG): 3.38
ALBUMIN SERPL-MCNC: 3.7 GM/DL (ref 3.4–4.8)
ALBUMIN/GLOB SERPL: 1.2 RATIO (ref 1.1–2)
ALP SERPL-CCNC: 123 UNIT/L (ref 40–150)
ALT SERPL-CCNC: 19 UNIT/L (ref 0–55)
AST SERPL-CCNC: 28 UNIT/L (ref 5–34)
BASOPHILS # BLD AUTO: 0.01 X10(3)/MCL
BASOPHILS NFR BLD AUTO: 0.2 %
BILIRUB SERPL-MCNC: 0.5 MG/DL
BILIRUBIN DIRECT+TOT PNL SERPL-MCNC: 0.2 MG/DL (ref 0–0.5)
BILIRUBIN DIRECT+TOT PNL SERPL-MCNC: 0.3 MG/DL
BUN SERPL-MCNC: 20 MG/DL (ref 9.8–20.1)
CALCIUM SERPL-MCNC: 9.2 MG/DL (ref 8.4–10.2)
CHLORIDE SERPL-SCNC: 104 MMOL/L (ref 98–107)
CO2 SERPL-SCNC: 27 MEQ/L (ref 23–31)
CREAT SERPL-MCNC: 1.12 MG/DL (ref 0.55–1.02)
CRP SERPL-MCNC: 0.21 MG/DL
EOSINOPHIL # BLD AUTO: 0.17 X10(3)/MCL
EOSINOPHIL NFR BLD AUTO: 3.1 %
ERYTHROCYTE [DISTWIDTH] IN BLOOD BY AUTOMATED COUNT: 19 %
ERYTHROCYTE [SEDIMENTATION RATE] IN BLOOD: 48 MM/HR (ref 0–20)
GLOBULIN SER-MCNC: 3.2 GM/DL (ref 2.4–3.5)
GLUCOSE SERPL-MCNC: 125 MG/DL (ref 82–115)
HCT VFR BLD AUTO: 34.1 % (ref 34–46)
HGB BLD-MCNC: 10 GM/DL (ref 11.3–15.4)
IMM GRANULOCYTES # BLD AUTO: 0 10*3/UL (ref 0–0.1)
IMM GRANULOCYTES NFR BLD AUTO: 0 % (ref 0–1)
LYMPHOCYTES # BLD AUTO: 1.27 X10(3)/MCL
LYMPHOCYTES NFR BLD AUTO: 23.3 %
MCH RBC QN AUTO: 24.7 PG (ref 27–33)
MCHC RBC AUTO-ENTMCNC: 29.3 GM/DL (ref 32–35)
MCV RBC AUTO: 84.2 FL (ref 81–97)
MONOCYTES # BLD AUTO: 0.62 X10(3)/MCL
MONOCYTES NFR BLD AUTO: 11.4 %
NEUTROPHILS # BLD AUTO: 3.38 X10(3)/MCL
NEUTROPHILS NFR BLD AUTO: 62 %
PLATELET # BLD AUTO: 174 X10(3)/MCL (ref 140–450)
PMV BLD AUTO: 10 FL
POTASSIUM SERPL-SCNC: 4.7 MMOL/L (ref 3.5–5.1)
PROT SERPL-MCNC: 6.9 GM/DL (ref 5.8–7.6)
RBC # BLD AUTO: 4.05 X10(6)/MCL (ref 3.9–5)
SODIUM SERPL-SCNC: 141 MMOL/L (ref 136–145)
WBC # SPEC AUTO: 5.45 X10(3)/MCL (ref 3.4–9.2)

## 2021-08-06 ENCOUNTER — HISTORICAL (OUTPATIENT)
Dept: LAB | Facility: HOSPITAL | Age: 61
End: 2021-08-06

## 2021-08-06 LAB
ABS NEUT (OLG): 2.73
ALBUMIN SERPL-MCNC: 3.7 GM/DL (ref 3.4–4.8)
ALBUMIN/GLOB SERPL: 1.1 RATIO (ref 1.1–2)
ALP SERPL-CCNC: 110 UNIT/L (ref 40–150)
ALT SERPL-CCNC: 24 UNIT/L (ref 0–55)
AST SERPL-CCNC: 40 UNIT/L (ref 5–34)
BASOPHILS # BLD AUTO: 0.01 X10(3)/MCL
BASOPHILS NFR BLD AUTO: 0.2 %
BILIRUB SERPL-MCNC: 0.4 MG/DL
BILIRUBIN DIRECT+TOT PNL SERPL-MCNC: 0.2 MG/DL
BILIRUBIN DIRECT+TOT PNL SERPL-MCNC: 0.2 MG/DL (ref 0–0.5)
BUN SERPL-MCNC: 12 MG/DL (ref 9.8–20.1)
CALCIUM SERPL-MCNC: 9.5 MG/DL (ref 8.4–10.2)
CHLORIDE SERPL-SCNC: 102 MMOL/L (ref 98–107)
CO2 SERPL-SCNC: 28 MEQ/L (ref 23–31)
CREAT SERPL-MCNC: 0.95 MG/DL (ref 0.55–1.02)
CRP SERPL-MCNC: 0.26 MG/DL
EOSINOPHIL # BLD AUTO: 0.21 X10(3)/MCL
EOSINOPHIL NFR BLD AUTO: 4.5 %
ERYTHROCYTE [DISTWIDTH] IN BLOOD BY AUTOMATED COUNT: 18 %
ERYTHROCYTE [SEDIMENTATION RATE] IN BLOOD: 23 MM/HR (ref 0–20)
GLOBULIN SER-MCNC: 3.3 GM/DL (ref 2.4–3.5)
GLUCOSE SERPL-MCNC: 143 MG/DL (ref 82–115)
HCT VFR BLD AUTO: 34.1 % (ref 34–46)
HGB BLD-MCNC: 9.9 GM/DL (ref 11.3–15.4)
IMM GRANULOCYTES # BLD AUTO: 0.01 10*3/UL (ref 0–0.1)
IMM GRANULOCYTES NFR BLD AUTO: 0.2 % (ref 0–1)
LYMPHOCYTES # BLD AUTO: 1.21 X10(3)/MCL
LYMPHOCYTES NFR BLD AUTO: 25.9 %
MCH RBC QN AUTO: 24.6 PG (ref 27–33)
MCHC RBC AUTO-ENTMCNC: 29 GM/DL (ref 32–35)
MCV RBC AUTO: 84.8 FL (ref 81–97)
MONOCYTES # BLD AUTO: 0.51 X10(3)/MCL
MONOCYTES NFR BLD AUTO: 10.9 %
NEUTROPHILS # BLD AUTO: 2.73 X10(3)/MCL
NEUTROPHILS NFR BLD AUTO: 58.3 %
PLATELET # BLD AUTO: 166 X10(3)/MCL (ref 140–450)
PMV BLD AUTO: 10 FL
POTASSIUM SERPL-SCNC: 4.1 MMOL/L (ref 3.5–5.1)
PROT SERPL-MCNC: 7 GM/DL (ref 5.8–7.6)
RBC # BLD AUTO: 4.02 X10(6)/MCL (ref 3.9–5)
SODIUM SERPL-SCNC: 142 MMOL/L (ref 136–145)
WBC # SPEC AUTO: 4.68 X10(3)/MCL (ref 3.4–9.2)

## 2021-10-26 ENCOUNTER — HISTORICAL (OUTPATIENT)
Dept: LAB | Facility: HOSPITAL | Age: 61
End: 2021-10-26

## 2021-10-26 LAB
ABS NEUT (OLG): 2.56
ALBUMIN SERPL-MCNC: 3.7 GM/DL (ref 3.4–4.8)
ALBUMIN/GLOB SERPL: 1.1 RATIO (ref 1.1–2)
ALP SERPL-CCNC: 92 UNIT/L (ref 40–150)
ALT SERPL-CCNC: 29 UNIT/L (ref 0–55)
AST SERPL-CCNC: 36 UNIT/L (ref 5–34)
BASOPHILS # BLD AUTO: 0.02 X10(3)/MCL
BASOPHILS NFR BLD AUTO: 0.5 %
BILIRUB SERPL-MCNC: 0.4 MG/DL
BILIRUBIN DIRECT+TOT PNL SERPL-MCNC: 0.2 MG/DL
BILIRUBIN DIRECT+TOT PNL SERPL-MCNC: 0.2 MG/DL (ref 0–0.5)
BUN SERPL-MCNC: 15 MG/DL (ref 9.8–20.1)
CALCIUM SERPL-MCNC: 9.4 MG/DL (ref 8.7–10.5)
CHLORIDE SERPL-SCNC: 104 MMOL/L (ref 98–107)
CHOLEST SERPL-MCNC: 128 MG/DL
CHOLEST/HDLC SERPL: 4 {RATIO} (ref 0–5)
CO2 SERPL-SCNC: 27 MEQ/L (ref 23–31)
CREAT SERPL-MCNC: 1.1 MG/DL (ref 0.55–1.02)
EOSINOPHIL # BLD AUTO: 0.14 X10(3)/MCL
EOSINOPHIL NFR BLD AUTO: 3.4 %
ERYTHROCYTE [DISTWIDTH] IN BLOOD BY AUTOMATED COUNT: 19 %
EST. AVERAGE GLUCOSE BLD GHB EST-MCNC: 157 MG/DL
GLOBULIN SER-MCNC: 3.4 GM/DL (ref 2.4–3.5)
GLUCOSE SERPL-MCNC: 144 MG/DL (ref 82–115)
HBA1C MFR BLD: 7.1 % (ref 4–6)
HCT VFR BLD AUTO: 32.9 % (ref 34–46)
HDLC SERPL-MCNC: 33 MG/DL (ref 35–60)
HGB BLD-MCNC: 9.3 GM/DL (ref 11.3–15.4)
HYPOCHROMIA BLD QL SMEAR: NORMAL
IMM GRANULOCYTES # BLD AUTO: 0.01 10*3/UL (ref 0–0.1)
IMM GRANULOCYTES NFR BLD AUTO: 0.2 % (ref 0–1)
LDLC SERPL CALC-MCNC: 54 MG/DL (ref 50–140)
LYMPHOCYTES # BLD AUTO: 0.96 X10(3)/MCL
LYMPHOCYTES NFR BLD AUTO: 23.6 %
MCH RBC QN AUTO: 24 PG (ref 27–33)
MCHC RBC AUTO-ENTMCNC: 28.3 GM/DL (ref 32–35)
MCV RBC AUTO: 85 FL (ref 81–97)
MONOCYTES # BLD AUTO: 0.37 X10(3)/MCL
MONOCYTES NFR BLD AUTO: 9.1 %
NEUTROPHILS # BLD AUTO: 2.56 X10(3)/MCL
NEUTROPHILS NFR BLD AUTO: 63.2 %
OVALOCYTES BLD QL SMEAR: NORMAL
PLATELET # BLD AUTO: 153 X10(3)/MCL (ref 140–450)
PLATELET # BLD EST: ADEQUATE 10*3/UL
PMV BLD AUTO: 10 FL
POIKILOCYTOSIS BLD QL SMEAR: SLIGHT
POTASSIUM SERPL-SCNC: 4.7 MMOL/L (ref 3.5–5.1)
PROT SERPL-MCNC: 7.1 GM/DL (ref 5.8–7.6)
PSA SERPL-MCNC: 1.08 NG/ML
RBC # BLD AUTO: 3.87 X10(6)/MCL (ref 3.9–5)
SCHISTOCYTES BLD QL AUTO: SLIGHT
SODIUM SERPL-SCNC: 141 MMOL/L (ref 136–145)
TRIGL SERPL-MCNC: 205 MG/DL (ref 37–140)
TSH SERPL-ACNC: 4.48 UIU/ML (ref 0.35–4.94)
VLDLC SERPL CALC-MCNC: 41 MG/DL
WBC # SPEC AUTO: 4.06 X10(3)/MCL (ref 3.4–9.2)

## 2021-11-05 ENCOUNTER — HISTORICAL (OUTPATIENT)
Dept: LAB | Facility: HOSPITAL | Age: 61
End: 2021-11-05

## 2021-11-05 LAB
ABS NEUT (OLG): 2.37
ALBUMIN SERPL-MCNC: 3.7 GM/DL (ref 3.4–4.8)
ALBUMIN/GLOB SERPL: 1.1 RATIO (ref 1.1–2)
ALP SERPL-CCNC: 103 UNIT/L (ref 40–150)
ALT SERPL-CCNC: 24 UNIT/L (ref 0–55)
ANISOCYTOSIS BLD QL SMEAR: NORMAL
AST SERPL-CCNC: 34 UNIT/L (ref 5–34)
BASOPHILS # BLD AUTO: 0.02 X10(3)/MCL
BASOPHILS NFR BLD AUTO: 0.5 %
BILIRUB SERPL-MCNC: 0.4 MG/DL
BILIRUBIN DIRECT+TOT PNL SERPL-MCNC: 0.2 MG/DL
BILIRUBIN DIRECT+TOT PNL SERPL-MCNC: 0.2 MG/DL (ref 0–0.5)
BUN SERPL-MCNC: 14 MG/DL (ref 8.4–25.7)
CALCIUM SERPL-MCNC: 9.5 MG/DL (ref 8.7–10.5)
CHLORIDE SERPL-SCNC: 104 MMOL/L (ref 98–107)
CO2 SERPL-SCNC: 27 MEQ/L (ref 23–31)
CREAT SERPL-MCNC: 1.11 MG/DL (ref 0.73–1.18)
CRP SERPL-MCNC: 0.2 MG/DL
EOSINOPHIL # BLD AUTO: 0.18 X10(3)/MCL
EOSINOPHIL NFR BLD AUTO: 4.4 %
ERYTHROCYTE [DISTWIDTH] IN BLOOD BY AUTOMATED COUNT: 19 %
ERYTHROCYTE [SEDIMENTATION RATE] IN BLOOD: 19 MM/HR (ref 0–20)
GLOBULIN SER-MCNC: 3.5 GM/DL (ref 2.4–3.5)
GLUCOSE SERPL-MCNC: 140 MG/DL (ref 82–115)
HCT VFR BLD AUTO: 33.6 % (ref 39–49)
HGB BLD-MCNC: 9.5 GM/DL (ref 12.6–16.6)
HYPOCHROMIA BLD QL SMEAR: NORMAL
IMM GRANULOCYTES # BLD AUTO: 0 10*3/UL (ref 0–0.1)
IMM GRANULOCYTES NFR BLD AUTO: 0 % (ref 0–1)
LYMPHOCYTES # BLD AUTO: 1.08 X10(3)/MCL
LYMPHOCYTES NFR BLD AUTO: 26.3 %
MCH RBC QN AUTO: 24 PG (ref 27–33)
MCHC RBC AUTO-ENTMCNC: 28.3 GM/DL (ref 32–35)
MCV RBC AUTO: 84.8 FL (ref 84–97)
MICROCYTES BLD QL SMEAR: NORMAL
MONOCYTES # BLD AUTO: 0.46 X10(3)/MCL
MONOCYTES NFR BLD AUTO: 11.2 %
NEUTROPHILS # BLD AUTO: 2.37 X10(3)/MCL
NEUTROPHILS NFR BLD AUTO: 57.6 %
PLATELET # BLD AUTO: 167 X10(3)/MCL (ref 140–450)
PLATELET # BLD EST: ADEQUATE 10*3/UL
PMV BLD AUTO: 10 FL
POIKILOCYTOSIS BLD QL SMEAR: NORMAL
POTASSIUM SERPL-SCNC: 4.6 MMOL/L (ref 3.5–5.1)
PROT SERPL-MCNC: 7.2 GM/DL (ref 5.8–7.6)
RBC # BLD AUTO: 3.96 X10(6)/MCL (ref 4.3–5.6)
SCHISTOCYTES BLD QL AUTO: SLIGHT
SODIUM SERPL-SCNC: 141 MMOL/L (ref 136–145)
WBC # SPEC AUTO: 4.11 X10(3)/MCL (ref 3.4–9.2)

## 2021-11-15 ENCOUNTER — HISTORICAL (OUTPATIENT)
Dept: LAB | Facility: HOSPITAL | Age: 61
End: 2021-11-15

## 2022-02-03 ENCOUNTER — HISTORICAL (OUTPATIENT)
Dept: LAB | Facility: HOSPITAL | Age: 62
End: 2022-02-03

## 2022-02-03 LAB
ABS NEUT (OLG): 2.76
ALBUMIN SERPL-MCNC: 3.7 G/DL (ref 3.4–4.8)
ALBUMIN/GLOB SERPL: 1.1 {RATIO} (ref 1.1–2)
ALP SERPL-CCNC: 112 U/L (ref 40–150)
ALT SERPL-CCNC: 26 U/L (ref 0–55)
AST SERPL-CCNC: 35 U/L (ref 5–34)
BASOPHILS # BLD AUTO: 0.02 10*3/UL
BASOPHILS NFR BLD AUTO: 0.4 %
BILIRUB SERPL-MCNC: 0.4 MG/DL
BILIRUBIN DIRECT+TOT PNL SERPL-MCNC: 0.2
BILIRUBIN DIRECT+TOT PNL SERPL-MCNC: 0.2 (ref 0–0.5)
BUN SERPL-MCNC: 18 MG/DL (ref 8.4–25.7)
CALCIUM SERPL-MCNC: 9.5 MG/DL (ref 8.7–10.5)
CHLORIDE SERPL-SCNC: 105 MMOL/L (ref 98–107)
CO2 SERPL-SCNC: 25 MMOL/L (ref 23–31)
CREAT SERPL-MCNC: 1.19 MG/DL (ref 0.73–1.18)
CRP SERPL-MCNC: 0.3 MG/L
EOSINOPHIL # BLD AUTO: 0.18 10*3/UL
EOSINOPHIL NFR BLD AUTO: 4 %
ERYTHROCYTE [DISTWIDTH] IN BLOOD BY AUTOMATED COUNT: 19 %
ERYTHROCYTE [SEDIMENTATION RATE] IN BLOOD: 29 MM/H (ref 0–20)
GLOBULIN SER-MCNC: 3.4 G/DL (ref 2.4–3.5)
GLUCOSE SERPL-MCNC: 139 MG/DL (ref 82–115)
HCT VFR BLD AUTO: 34.2 % (ref 39–49)
HEMOLYSIS INTERF INDEX SERPL-ACNC: 3
HGB BLD-MCNC: 9.8 G/DL (ref 12.6–16.6)
HYPOCHROMIA BLD QL SMEAR: SLIGHT
ICTERIC INTERF INDEX SERPL-ACNC: 0
IMM GRANULOCYTES # BLD AUTO: 0.01 10*3/UL (ref 0–0.1)
IMM GRANULOCYTES NFR BLD AUTO: 0.2 % (ref 0–1)
LIPEMIC INTERF INDEX SERPL-ACNC: 1
LYMPHOCYTES # BLD AUTO: 1.09 10*3/UL
LYMPHOCYTES NFR BLD AUTO: 24.3 %
MANUAL DIFF? (OHS): NO
MCH RBC QN AUTO: 24.3 PG (ref 27–33)
MCHC RBC AUTO-ENTMCNC: 28.7 G/DL (ref 32–35)
MCV RBC AUTO: 84.7 FL (ref 84–97)
MONOCYTES # BLD AUTO: 0.43 10*3/UL
MONOCYTES NFR BLD AUTO: 9.6 %
NEUTROPHILS # BLD AUTO: 2.76 10*3/UL
NEUTROPHILS NFR BLD AUTO: 61.5 %
PLATELET # BLD AUTO: 140 10*3/UL (ref 140–450)
PLATELET # BLD EST: ADEQUATE 10*3/UL
PMV BLD AUTO: 10 FL
POTASSIUM SERPL-SCNC: 4.6 MMOL/L (ref 3.5–5.1)
PROT SERPL-MCNC: 7.1 G/DL (ref 5.8–7.6)
RBC # BLD AUTO: 4.04 10*6/UL (ref 4.3–5.6)
SCAN RECIEVED (OHS): YES
SODIUM SERPL-SCNC: 140 MMOL/L (ref 136–145)
WBC # SPEC AUTO: 4.49 10*3/UL (ref 3.4–9.2)

## 2022-04-10 ENCOUNTER — HISTORICAL (OUTPATIENT)
Dept: ADMINISTRATIVE | Facility: HOSPITAL | Age: 62
End: 2022-04-10
Payer: MEDICARE

## 2022-04-25 VITALS
WEIGHT: 253.31 LBS | SYSTOLIC BLOOD PRESSURE: 147 MMHG | BODY MASS INDEX: 37.52 KG/M2 | DIASTOLIC BLOOD PRESSURE: 89 MMHG | HEIGHT: 69 IN

## 2022-05-06 ENCOUNTER — LAB VISIT (OUTPATIENT)
Dept: LAB | Facility: HOSPITAL | Age: 62
End: 2022-05-06
Attending: INTERNAL MEDICINE
Payer: MEDICARE

## 2022-05-06 DIAGNOSIS — M86.372 CHRONIC MULTIFOCAL OSTEOMYELITIS, LEFT ANKLE AND FOOT: Primary | ICD-10-CM

## 2022-05-06 LAB
ALBUMIN SERPL-MCNC: 3.6 GM/DL (ref 3.4–4.8)
ALBUMIN/GLOB SERPL: 1 RATIO (ref 1.1–2)
ALP SERPL-CCNC: 96 UNIT/L (ref 40–150)
ALT SERPL-CCNC: 17 UNIT/L (ref 0–55)
ANISOCYTOSIS BLD QL SMEAR: ABNORMAL
AST SERPL-CCNC: 31 UNIT/L (ref 5–34)
BASOPHILS # BLD AUTO: 0.02 X10(3)/MCL (ref 0–0.2)
BASOPHILS NFR BLD AUTO: 0.5 %
BILIRUBIN DIRECT+TOT PNL SERPL-MCNC: 0.2 MG/DL (ref 0–0.5)
BILIRUBIN DIRECT+TOT PNL SERPL-MCNC: 0.2 MG/DL (ref 0–0.8)
BILIRUBIN DIRECT+TOT PNL SERPL-MCNC: 0.4 MG/DL
BUN SERPL-MCNC: 15 MG/DL (ref 8.4–25.7)
CALCIUM SERPL-MCNC: 9 MG/DL (ref 8.8–10)
CHLORIDE SERPL-SCNC: 100 MMOL/L (ref 98–107)
CO2 SERPL-SCNC: 28 MMOL/L (ref 23–31)
CREAT SERPL-MCNC: 1.06 MG/DL (ref 0.73–1.18)
EOSINOPHIL # BLD AUTO: 0.17 X10(3)/MCL (ref 0–0.9)
EOSINOPHIL NFR BLD AUTO: 4.3 %
ERYTHROCYTE [DISTWIDTH] IN BLOOD BY AUTOMATED COUNT: 19.3 % (ref 11.5–17)
ERYTHROCYTE [SEDIMENTATION RATE] IN BLOOD: 19 MM/HR (ref 0–15)
GLOBULIN SER-MCNC: 3.7 GM/DL (ref 2.4–3.5)
GLUCOSE SERPL-MCNC: 144 MG/DL (ref 82–115)
HCT VFR BLD AUTO: 33.3 % (ref 42–52)
HGB BLD-MCNC: 9.3 GM/DL (ref 14–18)
HYPOCHROMIA BLD QL SMEAR: ABNORMAL
IMM GRANULOCYTES # BLD AUTO: 0 X10(3)/MCL (ref 0–0.02)
IMM GRANULOCYTES NFR BLD AUTO: 0 % (ref 0–0.43)
LYMPHOCYTES # BLD AUTO: 0.98 X10(3)/MCL (ref 0.6–4.6)
LYMPHOCYTES NFR BLD AUTO: 24.7 %
MCH RBC QN AUTO: 23.6 PG (ref 27–31)
MCHC RBC AUTO-ENTMCNC: 27.9 MG/DL (ref 33–36)
MCV RBC AUTO: 84.5 FL (ref 80–94)
MICROCYTES BLD QL SMEAR: ABNORMAL
MONOCYTES # BLD AUTO: 0.39 X10(3)/MCL (ref 0.1–1.3)
MONOCYTES NFR BLD AUTO: 9.8 %
NEUTROPHILS # BLD AUTO: 2.4 X10(3)/MCL (ref 2.1–9.2)
NEUTROPHILS NFR BLD AUTO: 60.7 %
NRBC BLD AUTO-RTO: 0 %
PLATELET # BLD AUTO: 147 X10(3)/MCL (ref 130–400)
PLATELET # BLD EST: ADEQUATE 10*3/UL
PMV BLD AUTO: 11.2 FL (ref 9.4–12.4)
POTASSIUM SERPL-SCNC: 4.3 MMOL/L (ref 3.5–5.1)
PROT SERPL-MCNC: 7.3 GM/DL (ref 5.8–7.6)
RBC # BLD AUTO: 3.94 X10(6)/MCL (ref 4.7–6.1)
SODIUM SERPL-SCNC: 140 MMOL/L (ref 136–145)
WBC # SPEC AUTO: 4 X10(3)/MCL (ref 4.5–11.5)

## 2022-05-06 PROCEDURE — 36415 COLL VENOUS BLD VENIPUNCTURE: CPT

## 2022-05-06 PROCEDURE — 85025 COMPLETE CBC W/AUTO DIFF WBC: CPT

## 2022-05-06 PROCEDURE — 80053 COMPREHEN METABOLIC PANEL: CPT

## 2022-05-06 PROCEDURE — 85651 RBC SED RATE NONAUTOMATED: CPT

## 2022-05-06 PROCEDURE — 86140 C-REACTIVE PROTEIN: CPT

## 2022-05-11 LAB — CRP SERPL-MCNC: 1.8 MG/L

## 2022-06-07 LAB
LEFT EYE DM RETINOPATHY: NEGATIVE
RIGHT EYE DM RETINOPATHY: NEGATIVE

## 2022-08-05 ENCOUNTER — LAB VISIT (OUTPATIENT)
Dept: LAB | Facility: HOSPITAL | Age: 62
End: 2022-08-05
Attending: INTERNAL MEDICINE
Payer: MEDICARE

## 2022-08-05 DIAGNOSIS — M86.372 CHRONIC MULTIFOCAL OSTEOMYELITIS, LEFT ANKLE AND FOOT: Primary | ICD-10-CM

## 2022-08-05 LAB
ALBUMIN SERPL-MCNC: 3.7 GM/DL (ref 3.4–4.8)
ALBUMIN/GLOB SERPL: 1.2 RATIO (ref 1.1–2)
ALP SERPL-CCNC: 94 UNIT/L (ref 40–150)
ALT SERPL-CCNC: 19 UNIT/L (ref 0–55)
AST SERPL-CCNC: 25 UNIT/L (ref 5–34)
BASOPHILS # BLD AUTO: 0.03 X10(3)/MCL (ref 0–0.2)
BASOPHILS NFR BLD AUTO: 0.6 %
BILIRUBIN DIRECT+TOT PNL SERPL-MCNC: 0.4 MG/DL
BUN SERPL-MCNC: 19 MG/DL (ref 8.4–25.7)
CALCIUM SERPL-MCNC: 9.4 MG/DL (ref 8.8–10)
CHLORIDE SERPL-SCNC: 102 MMOL/L (ref 98–107)
CO2 SERPL-SCNC: 28 MMOL/L (ref 23–31)
CREAT SERPL-MCNC: 1.04 MG/DL (ref 0.73–1.18)
CRP SERPL-MCNC: 1.8 MG/L
EOSINOPHIL # BLD AUTO: 0.23 X10(3)/MCL (ref 0–0.9)
EOSINOPHIL NFR BLD AUTO: 4.8 %
ERYTHROCYTE [DISTWIDTH] IN BLOOD BY AUTOMATED COUNT: 17.8 % (ref 11.5–17)
ERYTHROCYTE [SEDIMENTATION RATE] IN BLOOD: 21 MM/HR (ref 0–15)
GLOBULIN SER-MCNC: 3.2 GM/DL (ref 2.4–3.5)
GLUCOSE SERPL-MCNC: 151 MG/DL (ref 82–115)
HCT VFR BLD AUTO: 32 % (ref 42–52)
HGB BLD-MCNC: 9.3 GM/DL (ref 14–18)
IMM GRANULOCYTES # BLD AUTO: 0.01 X10(3)/MCL (ref 0–0.04)
IMM GRANULOCYTES NFR BLD AUTO: 0.2 %
LYMPHOCYTES # BLD AUTO: 1.08 X10(3)/MCL (ref 0.6–4.6)
LYMPHOCYTES NFR BLD AUTO: 22.4 %
MCH RBC QN AUTO: 23.5 PG (ref 27–31)
MCHC RBC AUTO-ENTMCNC: 29.1 MG/DL (ref 33–36)
MCV RBC AUTO: 81 FL (ref 80–94)
MONOCYTES # BLD AUTO: 0.53 X10(3)/MCL (ref 0.1–1.3)
MONOCYTES NFR BLD AUTO: 11 %
NEUTROPHILS # BLD AUTO: 2.9 X10(3)/MCL (ref 2.1–9.2)
NEUTROPHILS NFR BLD AUTO: 61 %
NRBC BLD AUTO-RTO: 0 %
PLATELET # BLD AUTO: 155 X10(3)/MCL (ref 130–400)
PMV BLD AUTO: 10.7 FL (ref 7.4–10.4)
POTASSIUM SERPL-SCNC: 4 MMOL/L (ref 3.5–5.1)
PROT SERPL-MCNC: 6.9 GM/DL (ref 5.8–7.6)
RBC # BLD AUTO: 3.95 X10(6)/MCL (ref 4.7–6.1)
SODIUM SERPL-SCNC: 142 MMOL/L (ref 136–145)
WBC # SPEC AUTO: 4.8 X10(3)/MCL (ref 4.5–11.5)

## 2022-08-05 PROCEDURE — 86140 C-REACTIVE PROTEIN: CPT

## 2022-08-05 PROCEDURE — 85651 RBC SED RATE NONAUTOMATED: CPT

## 2022-08-05 PROCEDURE — 85025 COMPLETE CBC W/AUTO DIFF WBC: CPT

## 2022-08-05 PROCEDURE — 36415 COLL VENOUS BLD VENIPUNCTURE: CPT

## 2022-08-05 PROCEDURE — 80053 COMPREHEN METABOLIC PANEL: CPT

## 2022-08-11 ENCOUNTER — LAB VISIT (OUTPATIENT)
Dept: LAB | Facility: HOSPITAL | Age: 62
End: 2022-08-11
Attending: INTERNAL MEDICINE
Payer: MEDICARE

## 2022-08-11 DIAGNOSIS — R53.83 FATIGUE, UNSPECIFIED TYPE: ICD-10-CM

## 2022-08-11 DIAGNOSIS — Z79.899 ENCOUNTER FOR LONG-TERM (CURRENT) USE OF OTHER MEDICATIONS: Primary | ICD-10-CM

## 2022-08-11 DIAGNOSIS — E78.5 HYPERLIPIDEMIA, UNSPECIFIED HYPERLIPIDEMIA TYPE: ICD-10-CM

## 2022-08-11 LAB
ALBUMIN SERPL-MCNC: 3.6 GM/DL (ref 3.4–4.8)
ALBUMIN/GLOB SERPL: 1.1 RATIO (ref 1.1–2)
ALP SERPL-CCNC: 92 UNIT/L (ref 40–150)
ALT SERPL-CCNC: 14 UNIT/L (ref 0–55)
AST SERPL-CCNC: 28 UNIT/L (ref 5–34)
BASOPHILS # BLD AUTO: 0.02 X10(3)/MCL (ref 0–0.2)
BASOPHILS NFR BLD AUTO: 0.4 %
BILIRUBIN DIRECT+TOT PNL SERPL-MCNC: 0.3 MG/DL
BUN SERPL-MCNC: 16 MG/DL (ref 8.4–25.7)
CALCIUM SERPL-MCNC: 9.3 MG/DL (ref 8.8–10)
CHLORIDE SERPL-SCNC: 105 MMOL/L (ref 98–107)
CHOLEST SERPL-MCNC: 116 MG/DL
CHOLEST/HDLC SERPL: 4 {RATIO} (ref 0–5)
CO2 SERPL-SCNC: 27 MMOL/L (ref 23–31)
CREAT SERPL-MCNC: 1.18 MG/DL (ref 0.73–1.18)
EOSINOPHIL # BLD AUTO: 0.22 X10(3)/MCL (ref 0–0.9)
EOSINOPHIL NFR BLD AUTO: 4.5 %
ERYTHROCYTE [DISTWIDTH] IN BLOOD BY AUTOMATED COUNT: 17.9 % (ref 11.5–17)
GFR SERPLBLD CREATININE-BSD FMLA CKD-EPI: >60 MLS/MIN/1.73/M2
GLOBULIN SER-MCNC: 3.3 GM/DL (ref 2.4–3.5)
GLUCOSE SERPL-MCNC: 147 MG/DL (ref 82–115)
HCT VFR BLD AUTO: 31.5 % (ref 42–52)
HDLC SERPL-MCNC: 29 MG/DL (ref 35–60)
HGB BLD-MCNC: 8.9 GM/DL (ref 14–18)
HYPOCHROMIA BLD QL SMEAR: ABNORMAL
IMM GRANULOCYTES # BLD AUTO: 0.01 X10(3)/MCL (ref 0–0.04)
IMM GRANULOCYTES NFR BLD AUTO: 0.2 %
LDLC SERPL CALC-MCNC: 60 MG/DL (ref 50–140)
LYMPHOCYTES # BLD AUTO: 1.11 X10(3)/MCL (ref 0.6–4.6)
LYMPHOCYTES NFR BLD AUTO: 22.6 %
MCH RBC QN AUTO: 23 PG (ref 27–31)
MCHC RBC AUTO-ENTMCNC: 28.3 MG/DL (ref 33–36)
MCV RBC AUTO: 81.4 FL (ref 80–94)
MONOCYTES # BLD AUTO: 0.55 X10(3)/MCL (ref 0.1–1.3)
MONOCYTES NFR BLD AUTO: 11.2 %
NEUTROPHILS # BLD AUTO: 3 X10(3)/MCL (ref 2.1–9.2)
NEUTROPHILS NFR BLD AUTO: 61.1 %
NRBC BLD AUTO-RTO: 0 %
PLATELET # BLD AUTO: 156 X10(3)/MCL (ref 130–400)
PLATELET # BLD EST: ADEQUATE 10*3/UL
PMV BLD AUTO: 10.5 FL (ref 7.4–10.4)
POTASSIUM SERPL-SCNC: 4.8 MMOL/L (ref 3.5–5.1)
PROT SERPL-MCNC: 6.9 GM/DL (ref 5.8–7.6)
RBC # BLD AUTO: 3.87 X10(6)/MCL (ref 4.7–6.1)
SODIUM SERPL-SCNC: 144 MMOL/L (ref 136–145)
TRIGL SERPL-MCNC: 136 MG/DL (ref 34–140)
VLDLC SERPL CALC-MCNC: 27 MG/DL
WBC # SPEC AUTO: 4.9 X10(3)/MCL (ref 4.5–11.5)

## 2022-08-11 PROCEDURE — 36415 COLL VENOUS BLD VENIPUNCTURE: CPT

## 2022-08-11 PROCEDURE — 80053 COMPREHEN METABOLIC PANEL: CPT

## 2022-08-11 PROCEDURE — 80061 LIPID PANEL: CPT

## 2022-08-11 PROCEDURE — 85025 COMPLETE CBC W/AUTO DIFF WBC: CPT

## 2022-11-04 ENCOUNTER — LAB VISIT (OUTPATIENT)
Dept: LAB | Facility: HOSPITAL | Age: 62
End: 2022-11-04
Attending: INTERNAL MEDICINE
Payer: MEDICARE

## 2022-11-04 DIAGNOSIS — M86.372 CHRONIC MULTIFOCAL OSTEOMYELITIS, LEFT ANKLE AND FOOT: Primary | ICD-10-CM

## 2022-11-04 LAB
ALBUMIN SERPL-MCNC: 3.9 GM/DL (ref 3.4–4.8)
ALBUMIN/GLOB SERPL: 1.2 RATIO (ref 1.1–2)
ALP SERPL-CCNC: 99 UNIT/L (ref 40–150)
ALT SERPL-CCNC: 28 UNIT/L (ref 0–55)
AST SERPL-CCNC: 40 UNIT/L (ref 5–34)
BILIRUBIN DIRECT+TOT PNL SERPL-MCNC: 0.4 MG/DL
BUN SERPL-MCNC: 21 MG/DL (ref 8.4–25.7)
CALCIUM SERPL-MCNC: 9.5 MG/DL (ref 8.8–10)
CHLORIDE SERPL-SCNC: 105 MMOL/L (ref 98–107)
CO2 SERPL-SCNC: 28 MMOL/L (ref 23–31)
CREAT SERPL-MCNC: 1.1 MG/DL (ref 0.73–1.18)
CRP SERPL-MCNC: 3.6 MG/L
ERYTHROCYTE [DISTWIDTH] IN BLOOD BY AUTOMATED COUNT: 19.7 % (ref 11.5–17)
ERYTHROCYTE [SEDIMENTATION RATE] IN BLOOD: 23 MM/HR (ref 0–15)
GFR SERPLBLD CREATININE-BSD FMLA CKD-EPI: >60 MLS/MIN/1.73/M2
GLOBULIN SER-MCNC: 3.2 GM/DL (ref 2.4–3.5)
GLUCOSE SERPL-MCNC: 169 MG/DL (ref 82–115)
HCT VFR BLD AUTO: 30.5 % (ref 42–52)
HGB BLD-MCNC: 8.8 GM/DL (ref 14–18)
HYPOCHROMIA BLD QL SMEAR: ABNORMAL
IMM GRANULOCYTES # BLD AUTO: 0.01 X10(3)/MCL (ref 0–0.04)
IMM GRANULOCYTES NFR BLD AUTO: 0.2 %
MCH RBC QN AUTO: 22.5 PG (ref 27–31)
MCHC RBC AUTO-ENTMCNC: 28.9 MG/DL (ref 33–36)
MCV RBC AUTO: 78 FL (ref 80–94)
NRBC BLD AUTO-RTO: 0 %
PLATELET # BLD AUTO: 160 X10(3)/MCL (ref 130–400)
PLATELET # BLD EST: ADEQUATE 10*3/UL
PMV BLD AUTO: 11.1 FL (ref 7.4–10.4)
POTASSIUM SERPL-SCNC: 4.9 MMOL/L (ref 3.5–5.1)
PROT SERPL-MCNC: 7.1 GM/DL (ref 5.8–7.6)
RBC # BLD AUTO: 3.91 X10(6)/MCL (ref 4.7–6.1)
SODIUM SERPL-SCNC: 143 MMOL/L (ref 136–145)
WBC # SPEC AUTO: 4.3 X10(3)/MCL (ref 4.5–11.5)

## 2022-11-04 PROCEDURE — 86140 C-REACTIVE PROTEIN: CPT

## 2022-11-04 PROCEDURE — 85651 RBC SED RATE NONAUTOMATED: CPT

## 2022-11-04 PROCEDURE — 85025 COMPLETE CBC W/AUTO DIFF WBC: CPT

## 2022-11-04 PROCEDURE — 80053 COMPREHEN METABOLIC PANEL: CPT

## 2022-11-04 PROCEDURE — 36415 COLL VENOUS BLD VENIPUNCTURE: CPT

## 2022-11-17 ENCOUNTER — HOSPITAL ENCOUNTER (EMERGENCY)
Facility: HOSPITAL | Age: 62
Discharge: HOME | End: 2022-11-17
Attending: STUDENT IN AN ORGANIZED HEALTH CARE EDUCATION/TRAINING PROGRAM
Payer: MEDICARE

## 2022-11-17 ENCOUNTER — OFFICE VISIT (OUTPATIENT)
Dept: FAMILY MEDICINE | Facility: CLINIC | Age: 62
End: 2022-11-17
Payer: MEDICARE

## 2022-11-17 VITALS
RESPIRATION RATE: 20 BRPM | TEMPERATURE: 97 F | DIASTOLIC BLOOD PRESSURE: 72 MMHG | SYSTOLIC BLOOD PRESSURE: 138 MMHG | BODY MASS INDEX: 39.72 KG/M2 | HEART RATE: 80 BPM | OXYGEN SATURATION: 96 % | HEIGHT: 69 IN | WEIGHT: 268.19 LBS

## 2022-11-17 VITALS
HEART RATE: 73 BPM | TEMPERATURE: 99 F | WEIGHT: 260 LBS | SYSTOLIC BLOOD PRESSURE: 138 MMHG | DIASTOLIC BLOOD PRESSURE: 76 MMHG | OXYGEN SATURATION: 100 % | RESPIRATION RATE: 18 BRPM | BODY MASS INDEX: 38.51 KG/M2 | HEIGHT: 69 IN

## 2022-11-17 DIAGNOSIS — E11.9 TYPE 2 DIABETES MELLITUS WITHOUT COMPLICATION, WITHOUT LONG-TERM CURRENT USE OF INSULIN: ICD-10-CM

## 2022-11-17 DIAGNOSIS — Z12.5 ENCOUNTER FOR SCREENING FOR MALIGNANT NEOPLASM OF PROSTATE: ICD-10-CM

## 2022-11-17 DIAGNOSIS — Z76.89 ESTABLISHING CARE WITH NEW DOCTOR, ENCOUNTER FOR: Primary | ICD-10-CM

## 2022-11-17 DIAGNOSIS — E78.5 HYPERLIPIDEMIA, UNSPECIFIED HYPERLIPIDEMIA TYPE: ICD-10-CM

## 2022-11-17 DIAGNOSIS — E66.9 CLASS 2 OBESITY WITH BODY MASS INDEX (BMI) OF 39.0 TO 39.9 IN ADULT, UNSPECIFIED OBESITY TYPE, UNSPECIFIED WHETHER SERIOUS COMORBIDITY PRESENT: ICD-10-CM

## 2022-11-17 DIAGNOSIS — Z00.00 WELLNESS EXAMINATION: ICD-10-CM

## 2022-11-17 DIAGNOSIS — E03.9 HYPOTHYROIDISM, UNSPECIFIED TYPE: ICD-10-CM

## 2022-11-17 DIAGNOSIS — D64.9 CHRONIC ANEMIA: Primary | ICD-10-CM

## 2022-11-17 DIAGNOSIS — D64.9 ANEMIA, UNSPECIFIED TYPE: ICD-10-CM

## 2022-11-17 DIAGNOSIS — I25.10 CORONARY ARTERY DISEASE, UNSPECIFIED VESSEL OR LESION TYPE, UNSPECIFIED WHETHER ANGINA PRESENT, UNSPECIFIED WHETHER NATIVE OR TRANSPLANTED HEART: ICD-10-CM

## 2022-11-17 DIAGNOSIS — I10 HYPERTENSION, UNSPECIFIED TYPE: ICD-10-CM

## 2022-11-17 PROBLEM — E66.812 CLASS 2 OBESITY WITH BODY MASS INDEX (BMI) OF 39.0 TO 39.9 IN ADULT: Status: ACTIVE | Noted: 2022-11-17

## 2022-11-17 LAB
ALBUMIN SERPL-MCNC: 3.8 GM/DL (ref 3.4–4.8)
ALBUMIN/GLOB SERPL: 1.2 RATIO (ref 1.1–2)
ALP SERPL-CCNC: 104 UNIT/L (ref 40–150)
ALT SERPL-CCNC: 23 UNIT/L (ref 0–55)
APPEARANCE UR: CLEAR
AST SERPL-CCNC: 33 UNIT/L (ref 5–34)
BACTERIA #/AREA URNS AUTO: NORMAL /HPF
BASOPHILS # BLD AUTO: 0.02 X10(3)/MCL (ref 0–0.2)
BASOPHILS NFR BLD AUTO: 0.4 %
BILIRUB UR QL STRIP.AUTO: NEGATIVE MG/DL
BILIRUBIN DIRECT+TOT PNL SERPL-MCNC: 0.3 MG/DL
BUN SERPL-MCNC: 16.5 MG/DL (ref 8.4–25.7)
CALCIUM SERPL-MCNC: 9.6 MG/DL (ref 8.8–10)
CHLORIDE SERPL-SCNC: 105 MMOL/L (ref 98–107)
CO2 SERPL-SCNC: 25 MMOL/L (ref 23–31)
COLOR UR AUTO: YELLOW
CREAT SERPL-MCNC: 0.97 MG/DL (ref 0.73–1.18)
EOSINOPHIL # BLD AUTO: 0.18 X10(3)/MCL (ref 0–0.9)
EOSINOPHIL NFR BLD AUTO: 3.7 %
ERYTHROCYTE [DISTWIDTH] IN BLOOD BY AUTOMATED COUNT: 19.4 % (ref 11.5–17)
GFR SERPLBLD CREATININE-BSD FMLA CKD-EPI: >60 MLS/MIN/1.73/M2
GLOBULIN SER-MCNC: 3.3 GM/DL (ref 2.4–3.5)
GLUCOSE SERPL-MCNC: 134 MG/DL (ref 82–115)
GLUCOSE UR QL STRIP.AUTO: ABNORMAL MG/DL
GROUP & RH: NORMAL
HCT VFR BLD AUTO: 31.7 % (ref 42–52)
HGB BLD-MCNC: 8.9 GM/DL (ref 14–18)
IMM GRANULOCYTES # BLD AUTO: 0.01 X10(3)/MCL (ref 0–0.04)
IMM GRANULOCYTES NFR BLD AUTO: 0.2 %
INDIRECT COOMBS GEL: NORMAL
INR BLD: 0.98 (ref 0–1.3)
KETONES UR QL STRIP.AUTO: NEGATIVE MG/DL
LEUKOCYTE ESTERASE UR QL STRIP.AUTO: NEGATIVE UNIT/L
LYMPHOCYTES # BLD AUTO: 1.31 X10(3)/MCL (ref 0.6–4.6)
LYMPHOCYTES NFR BLD AUTO: 27.2 %
MCH RBC QN AUTO: 22.3 PG (ref 27–31)
MCHC RBC AUTO-ENTMCNC: 28.1 MG/DL (ref 33–36)
MCV RBC AUTO: 79.4 FL (ref 80–94)
MONOCYTES # BLD AUTO: 0.48 X10(3)/MCL (ref 0.1–1.3)
MONOCYTES NFR BLD AUTO: 10 %
NEUTROPHILS # BLD AUTO: 2.8 X10(3)/MCL (ref 2.1–9.2)
NEUTROPHILS NFR BLD AUTO: 58.5 %
NITRITE UR QL STRIP.AUTO: NEGATIVE
NRBC BLD AUTO-RTO: 0 %
PH UR STRIP.AUTO: 5.5 [PH]
PLATELET # BLD AUTO: 141 X10(3)/MCL (ref 130–400)
PMV BLD AUTO: 10.7 FL (ref 7.4–10.4)
POTASSIUM SERPL-SCNC: 4.4 MMOL/L (ref 3.5–5.1)
PROT SERPL-MCNC: 7.1 GM/DL (ref 5.8–7.6)
PROT UR QL STRIP.AUTO: ABNORMAL MG/DL
PROTHROMBIN TIME: 12.9 SECONDS (ref 12.5–14.5)
RBC # BLD AUTO: 3.99 X10(6)/MCL (ref 4.7–6.1)
RBC #/AREA URNS AUTO: <5 /HPF
RBC UR QL AUTO: NEGATIVE UNIT/L
SODIUM SERPL-SCNC: 138 MMOL/L (ref 136–145)
SP GR UR STRIP.AUTO: 1.03 (ref 1–1.03)
SQUAMOUS #/AREA URNS AUTO: <5 /HPF
UROBILINOGEN UR STRIP-ACNC: 0.2 MG/DL
WBC # SPEC AUTO: 4.8 X10(3)/MCL (ref 4.5–11.5)
WBC #/AREA URNS AUTO: <5 /HPF

## 2022-11-17 PROCEDURE — 80053 COMPREHEN METABOLIC PANEL: CPT | Performed by: NURSE PRACTITIONER

## 2022-11-17 PROCEDURE — 99283 EMERGENCY DEPT VISIT LOW MDM: CPT

## 2022-11-17 PROCEDURE — 99205 PR OFFICE/OUTPT VISIT, NEW, LEVL V, 60-74 MIN: ICD-10-PCS | Mod: ,,, | Performed by: REGISTERED NURSE

## 2022-11-17 PROCEDURE — 81001 URINALYSIS AUTO W/SCOPE: CPT | Performed by: NURSE PRACTITIONER

## 2022-11-17 PROCEDURE — 85025 COMPLETE CBC W/AUTO DIFF WBC: CPT | Performed by: NURSE PRACTITIONER

## 2022-11-17 PROCEDURE — 85610 PROTHROMBIN TIME: CPT | Performed by: NURSE PRACTITIONER

## 2022-11-17 PROCEDURE — 86850 RBC ANTIBODY SCREEN: CPT | Performed by: NURSE PRACTITIONER

## 2022-11-17 PROCEDURE — 99205 OFFICE O/P NEW HI 60 MIN: CPT | Mod: ,,, | Performed by: REGISTERED NURSE

## 2022-11-17 RX ORDER — ATORVASTATIN CALCIUM 40 MG/1
40 TABLET, FILM COATED ORAL DAILY
COMMUNITY
Start: 2022-10-06 | End: 2023-01-23

## 2022-11-17 RX ORDER — DOXYCYCLINE HYCLATE 100 MG
100 TABLET ORAL 2 TIMES DAILY
COMMUNITY
Start: 2022-11-11

## 2022-11-17 RX ORDER — RAMIPRIL 2.5 MG/1
2.5 CAPSULE ORAL DAILY
COMMUNITY
Start: 2022-11-03

## 2022-11-17 RX ORDER — ASPIRIN 81 MG/1
81 TABLET ORAL
COMMUNITY

## 2022-11-17 RX ORDER — CARVEDILOL 12.5 MG/1
12.5 TABLET ORAL 2 TIMES DAILY
COMMUNITY
Start: 2022-11-03

## 2022-11-17 RX ORDER — LEVOTHYROXINE SODIUM 50 UG/1
50 TABLET ORAL DAILY
Qty: 90 TABLET | Refills: 0 | Status: SHIPPED | OUTPATIENT
Start: 2022-11-17 | End: 2023-02-28

## 2022-11-17 RX ORDER — LEVOTHYROXINE SODIUM 50 UG/1
50 TABLET ORAL DAILY
COMMUNITY
Start: 2022-08-05 | End: 2022-11-17 | Stop reason: SDUPTHER

## 2022-11-17 RX ORDER — FUROSEMIDE 20 MG/1
20 TABLET ORAL DAILY PRN
COMMUNITY
Start: 2022-10-20 | End: 2023-09-11 | Stop reason: SDUPTHER

## 2022-11-17 RX ORDER — METFORMIN HYDROCHLORIDE 1000 MG/1
1000 TABLET ORAL 2 TIMES DAILY
COMMUNITY
Start: 2022-09-28 | End: 2022-12-27 | Stop reason: SDUPTHER

## 2022-11-17 RX ORDER — CIPROFLOXACIN 500 MG/1
500 TABLET ORAL 2 TIMES DAILY
COMMUNITY
Start: 2022-10-30

## 2022-11-17 NOTE — ED PROVIDER NOTES
"Encounter Date: 11/17/2022    SCRIBE #1 NOTE: I, Klaudia Maddox, am scribing for, and in the presence of,  Sunday Warren IV, MD. I have scribed the following portions of the note - Other sections scribed: HPI, ROS, PE.     History     Chief Complaint   Patient presents with    abnormal labwork     Seen at Dr. Connolly today and sent for low H&H. Patient reports weakness and fatigue for "awhile". Denies blood in stool, black stools, abdominal pain. Takes 81mg ASA daily. GI; Dr. rEickson.     62 year old male w/ hx of CAD w/ stents and CABG, Charcot-Abbei-Tooth disorder, DM, HLD, and HTN presents to ED for low H&H. Pt's wife states they were told come here by their new NP after their old PCP passed away because the pt's hemoglobin was an 8. Wife states the pt's H&H has been low since August but just found out today. Pt reports some mild fatigue. Pt denies black stools or any blood. Pt denies any other complaints. He is on aspirin.     The history is provided by the patient and the spouse. No  was used.   Illness   The current episode started several weeks ago. The problem has been unchanged. The pain is at a severity of 0/10. Pertinent negatives include no fever, no abdominal pain, no nausea, no vomiting, no congestion, no rhinorrhea, no sore throat, no cough, no shortness of breath and no rash. Services received include tests performed. Recently, medical care has been given by the PCP.   Review of patient's allergies indicates:   Allergen Reactions    Hydrocodone      Other reaction(s): hyperactive  can't sleep    Hydrocodone-acetaminophen      Past Medical History:   Diagnosis Date    Anemia, unspecified     CAD (coronary artery disease)     Charcot-Abbie-Tooth disease type 2     Chronic osteomyelitis of left ankle     Diabetes mellitus, type 2     GERD (gastroesophageal reflux disease)     History of osteomyelitis     Hyperlipidemia     Hypertension     Hypothyroidism, unspecified     Muscular " dystrophy     RITO (obstructive sleep apnea)     RITO on CPAP     Osteoarthritis of ankle and foot, right     Osteoarthritis of left knee     Restless legs syndrome (RLS)      Past Surgical History:   Procedure Laterality Date    ANKLE FUSION Left     ANKLE FUSION Right 09/21/2020    Dr Maryjane Chow    ANKLE FUSION  07/06/2020    Dr Maryjane Chow    ANKLE HARDWARE REMOVAL Left 09/21/2020    Dr Maryjane Chow    CARDIAC CATHETERIZATION Left 01/22/2019    Dr Alexander Garcia    CORONARY ANGIOPLASTY WITH STENT PLACEMENT Left 02/17/2016    Dr Clarence De La Rosa    CORONARY ARTERY BYPASS GRAFT  2006    x3    CORONARY STENT PLACEMENT Left 02/17/2016    Dr Clarence De La Rosa    INCISION AND DRAINAGE  10/18/2010    INCISION AND DRAINAGE OF LOWER EXTREMITY Left 07/24/2014    Dr Maryjane Chow    INCISION AND DRAINAGE, BONE ABSCESS OR OSTEOMYELITIS, FOOT Bilateral 2007    TONSILLECTOMY  1962     Family History   Problem Relation Age of Onset    Stroke Mother     Diabetes Mellitus Mother     Lung cancer Father     Colon cancer Father     Diabetes Mellitus Father      Social History     Tobacco Use    Smoking status: Former     Types: Cigarettes    Smokeless tobacco: Never   Substance Use Topics    Alcohol use: Not Currently    Drug use: Never     Review of Systems   Constitutional:  Positive for fatigue. Negative for chills and fever.   HENT:  Negative for congestion, rhinorrhea and sore throat.    Eyes:  Negative for visual disturbance.   Respiratory:  Negative for cough and shortness of breath.    Cardiovascular:  Negative for chest pain.   Gastrointestinal:  Negative for abdominal pain, anal bleeding, blood in stool, nausea and vomiting.   Genitourinary:  Negative for dysuria and hematuria.   Musculoskeletal:  Negative for joint swelling.   Skin:  Negative for rash.   Neurological:  Negative for weakness.   Psychiatric/Behavioral:  Negative for confusion.    All other systems reviewed and are  negative.    Physical Exam     Initial Vitals [11/17/22 1212]   BP Pulse Resp Temp SpO2   138/76 71 20 99 °F (37.2 °C) 99 %      MAP       --         Physical Exam    Nursing note and vitals reviewed.  Constitutional: He is not diaphoretic. No distress.   HENT:   Head: Normocephalic and atraumatic.   Eyes: EOM are normal. Pupils are equal, round, and reactive to light.   Neck: Neck supple.   Normal range of motion.  Cardiovascular:  Normal rate and regular rhythm.           No murmur heard.  Pulmonary/Chest: Breath sounds normal. No respiratory distress. He has no wheezes. He has no rales.   Abdominal: Abdomen is soft. He exhibits no distension. There is no abdominal tenderness.   Musculoskeletal:         General: No edema. Normal range of motion.      Cervical back: Normal range of motion and neck supple.     Neurological: He is alert and oriented to person, place, and time. He has normal strength. No cranial nerve deficit.   Skin: Skin is warm. No rash noted. There is pallor.   Psychiatric: He has a normal mood and affect.       ED Course   Procedures  Labs Reviewed   COMPREHENSIVE METABOLIC PANEL - Abnormal; Notable for the following components:       Result Value    Glucose Level 134 (*)     All other components within normal limits   URINALYSIS, REFLEX TO URINE CULTURE - Abnormal; Notable for the following components:    Protein, UA Trace (*)     Glucose, UA 3+ (*)     All other components within normal limits   CBC WITH DIFFERENTIAL - Abnormal; Notable for the following components:    RBC 3.99 (*)     Hgb 8.9 (*)     Hct 31.7 (*)     MCV 79.4 (*)     MCH 22.3 (*)     MCHC 28.1 (*)     RDW 19.4 (*)     MPV 10.7 (*)     All other components within normal limits   PROTIME-INR - Normal   URINALYSIS, MICROSCOPIC - Normal   CBC W/ AUTO DIFFERENTIAL    Narrative:     The following orders were created for panel order CBC Auto Differential.  Procedure                               Abnormality         Status                      ---------                               -----------         ------                     CBC with Differential[[id number]]        Abnormal            Final result                 Please view results for these tests on the individual orders.   TYPE & SCREEN          Imaging Results    None          Medications - No data to display  Medical Decision Making:   History:   Old Medical Records: I decided to obtain old medical records.  Initial Assessment:   Sent to ER by new PCP for chronic anemia. Hb 8-9 over past 9 months. Stable today. Mild fatigue, no black stools, hematemesis, hematochezia. Well appearing, vitals wnl. Does not warrant an inpatient workup. Has an outpatient GI doctor he can follow with. Strict return precautions given.   Clinical Tests:   Lab Tests: Ordered and Reviewed        Scribe Attestation:   Scribe #1: I performed the above scribed service and the documentation accurately describes the services I performed. I attest to the accuracy of the note.    Attending Attestation:           Physician Attestation for Scribe:  Physician Attestation Statement for Scribe #1: I, Sunday Warren IV, MD, reviewed documentation, as scribed by Klaudia Maddox in my presence, and it is both accurate and complete.           ED Course as of 11/17/22 2144   Thu Nov 17, 2022   1326 Patient with a history of chronic low hemoglobin over the past at least 6 months sent by his new  primary care provider for low hemoglobin.  Patient has had some mild fatigue is otherwise asymptomatic denies any bright red blood per rectum any hematemesis any dark stools.  Has an outpatient GI doctor.  Patient is well-appearing no complaints at this time his hemoglobin is stable no need for inpatient workup at this time will discharge with GI follow-up return precautions given for any worsening or new symptoms.  Patient voices understanding all questions answered to the best of my ability [AC]      ED Course User Index  [AC] Sunday PRAJAPATI  Briana FOUNTAIN MD                   Clinical Impression:   Final diagnoses:  [D64.9] Chronic anemia (Primary)      ED Disposition Condition    Discharge Stable          ED Prescriptions    None       Follow-up Information       Follow up With Specialties Details Why Contact Info    Ventura Parsons DO Family Medicine, Hospitalist Schedule an appointment as soon as possible for a visit   [address] [facility] [address]  [phone]      [facility] - Emergency Dept Emergency Medicine Go to  If symptoms worsen [address] [facility] [id number] [phone]    Romel Erickson MD Gastroenterology Schedule an appointment as soon as possible for a visit   [address] [facility] [address]  [facility] [address]  [phone]          ISunday MD personally performed the history, PE, MDM, and procedures as documented above and agree with the scribe's documentation.        Sunday Warren IV, MD  11/17/22 6033

## 2022-11-17 NOTE — FIRST PROVIDER EVALUATION
Medical screening examination initiated.  I have conducted a focused provider triage encounter, findings are as follows:    Brief history of present illness:  Patient states that he was sent to the ED due to his H&H being low on his outpatient labs. Denies any blood in his stools.     There were no vitals filed for this visit.    Pertinent physical exam:  Awake, alert, ambulatory      Brief workup plan:  labs, eval    Preliminary workup initiated; this workup will be continued and followed by the physician or advanced practice provider that is assigned to the patient when roomed.

## 2022-11-17 NOTE — PROGRESS NOTES
Subjective:       Patient ID: Aba Dunbar is a 62 y.o. male.    Chief Complaint: Establish Care    Pt is a 61y/o male here today to [facility]. Pt has PMH: DM, HTN, HLD, CABGx4, hypothyroidism, MD, and osteomyelitis of L foot. Pt c/o SOB on exertion, fatigue, and palpitations. 02 sat 96% on room air, pt denies fever and chills at this time.  Review of Systems   Constitutional:  Positive for fatigue. Negative for chills and fever.   HENT: Negative.     Eyes: Negative.    Respiratory:  Positive for shortness of breath.    Cardiovascular:  Positive for palpitations.   Gastrointestinal: Negative.    Endocrine: Negative.    Genitourinary: Negative.    Musculoskeletal: Negative.    Integumentary:  Positive for color change.        Bruising noted to Bilateral feet, pt denies recent trauma   Allergic/Immunologic: Negative.    Neurological:  Negative for dizziness and syncope.   Hematological: Negative.    Psychiatric/Behavioral: Negative.         Objective:      Physical Exam  Constitutional:       Appearance: Normal appearance. He is obese.   HENT:      Head: Normocephalic.      Right Ear: Tympanic membrane normal.      Left Ear: Tympanic membrane normal.      Nose: Nose normal.      Mouth/Throat:      Mouth: Mucous membranes are moist.   Eyes:      Pupils: Pupils are equal, round, and reactive to light.   Cardiovascular:      Rate and Rhythm: Normal rate.      Pulses:           Dorsalis pedis pulses are 1+ on the right side and 1+ on the left side.        Posterior tibial pulses are 1+ on the right side and 1+ on the left side.      Heart sounds: Normal heart sounds.   Pulmonary:      Effort: Pulmonary effort is normal. No tachypnea or respiratory distress.      Breath sounds: Normal breath sounds.   Abdominal:      General: There is distension.      Tenderness: There is no abdominal tenderness. There is no guarding.   Musculoskeletal:      Cervical back: Normal range of motion and neck supple.      Right lower leg:  Edema present.      Left lower leg: Edema present.   Skin:     Findings: Bruising present.      Comments: Bilateral feet   Neurological:      General: No focal deficit present.      Mental Status: He is alert and oriented to person, place, and time.   Psychiatric:         Mood and Affect: Mood normal.         Behavior: Behavior normal.         Thought Content: Thought content normal.         Judgment: Judgment normal.       Assessment:       Problem List Items Addressed This Visit          Cardiac/Vascular    Coronary artery disease    Hyperlipidemia    Hypertension       Endocrine    Type 2 diabetes mellitus without complication, without long-term current use of insulin    Relevant Medications    metFORMIN (GLUCOPHAGE) 1000 MG tablet    Other Relevant Orders    Hemoglobin A1C    Microalbumin/Creatinine Ratio, Urine    Hypothyroidism    Relevant Medications    levothyroxine (SYNTHROID) 50 MCG tablet    Other Relevant Orders    TSH    Class 2 obesity with body mass index (BMI) of 39.0 to 39.9 in adult     Other Visit Diagnoses       Establishing care with new doctor, encounter for    -  Primary    Wellness examination        Relevant Orders    PSA, Screening    HIV 1/2 Ag/Ab (4th Gen)    Hepatitis C Antibody    Encounter for screening for malignant neoplasm of prostate        Relevant Orders    PSA, Screening    Ambulatory referral/consult to Gastroenterology    Anemia, unspecified type        Relevant Orders    CBC Auto Differential              Plan:   CBC discussed with patient, patient was advised because he is symptomatic it is recommended that you present to the ER now for further evaluation to prevent a delay in care. Offered transport via EMS but patient/family declines despite informed risks including death.  Opts for private transport via personal vehicle.      Spoke with triage nurse regarding this patient's condition and need for evaluation.

## 2022-11-18 ENCOUNTER — TELEPHONE (OUTPATIENT)
Dept: FAMILY MEDICINE | Facility: CLINIC | Age: 62
End: 2022-11-18
Payer: MEDICARE

## 2022-11-18 NOTE — TELEPHONE ENCOUNTER
Called Dr. Erickson's office and spoke with Alyssa regarding patient's abnormal labs and needing an appt asap. She explained she would have to have Sabine call the patient and schedule due to she was in charge of the scheduled. I also faxed most recent labs to his office.

## 2022-12-01 LAB — CRC RECOMMENDATION EXT: NORMAL

## 2022-12-27 DIAGNOSIS — E11.9 TYPE 2 DIABETES MELLITUS WITHOUT COMPLICATION, WITHOUT LONG-TERM CURRENT USE OF INSULIN: Primary | ICD-10-CM

## 2022-12-27 RX ORDER — METFORMIN HYDROCHLORIDE 1000 MG/1
1000 TABLET ORAL 2 TIMES DAILY
Qty: 180 TABLET | Refills: 1 | Status: SHIPPED | OUTPATIENT
Start: 2022-12-27 | End: 2023-05-01

## 2023-02-03 ENCOUNTER — LAB VISIT (OUTPATIENT)
Dept: LAB | Facility: HOSPITAL | Age: 63
End: 2023-02-03
Attending: INTERNAL MEDICINE
Payer: MEDICARE

## 2023-02-03 DIAGNOSIS — M86.372 CHRONIC MULTIFOCAL OSTEOMYELITIS, LEFT ANKLE AND FOOT: Primary | ICD-10-CM

## 2023-02-03 DIAGNOSIS — E03.9 HYPOTHYROIDISM, UNSPECIFIED TYPE: ICD-10-CM

## 2023-02-03 DIAGNOSIS — Z00.00 WELLNESS EXAMINATION: ICD-10-CM

## 2023-02-03 DIAGNOSIS — Z12.5 ENCOUNTER FOR SCREENING FOR MALIGNANT NEOPLASM OF PROSTATE: ICD-10-CM

## 2023-02-03 DIAGNOSIS — D64.9 ANEMIA, UNSPECIFIED TYPE: Primary | ICD-10-CM

## 2023-02-03 DIAGNOSIS — E11.9 TYPE 2 DIABETES MELLITUS WITHOUT COMPLICATION, WITHOUT LONG-TERM CURRENT USE OF INSULIN: ICD-10-CM

## 2023-02-03 DIAGNOSIS — D64.9 ANEMIA, UNSPECIFIED TYPE: ICD-10-CM

## 2023-02-03 LAB
ACANTHOCYTES (OLG): SLIGHT
ALBUMIN SERPL-MCNC: 3.8 G/DL (ref 3.4–4.8)
ALBUMIN/GLOB SERPL: 1.2 RATIO (ref 1.1–2)
ALP SERPL-CCNC: 89 UNIT/L (ref 40–150)
ALT SERPL-CCNC: 20 UNIT/L (ref 0–55)
ANISOCYTOSIS BLD QL SMEAR: ABNORMAL
AST SERPL-CCNC: 34 UNIT/L (ref 5–34)
BASOPHILS # BLD AUTO: 0.03 X10(3)/MCL (ref 0–0.2)
BASOPHILS NFR BLD AUTO: 0.7 %
BILIRUBIN DIRECT+TOT PNL SERPL-MCNC: 0.4 MG/DL
BUN SERPL-MCNC: 17 MG/DL (ref 8.4–25.7)
CALCIUM SERPL-MCNC: 9.2 MG/DL (ref 8.8–10)
CHLORIDE SERPL-SCNC: 105 MMOL/L (ref 98–107)
CO2 SERPL-SCNC: 27 MMOL/L (ref 23–31)
CREAT SERPL-MCNC: 1.2 MG/DL (ref 0.73–1.18)
CREAT UR-MCNC: 115 MG/DL (ref 63–166)
CRP SERPL-MCNC: 1.5 MG/L
EOSINOPHIL # BLD AUTO: 0.17 X10(3)/MCL (ref 0–0.9)
EOSINOPHIL NFR BLD AUTO: 4.2 %
ERYTHROCYTE [DISTWIDTH] IN BLOOD BY AUTOMATED COUNT: 19.9 % (ref 11.5–17)
ERYTHROCYTE [SEDIMENTATION RATE] IN BLOOD: 22 MM/HR (ref 0–15)
EST. AVERAGE GLUCOSE BLD GHB EST-MCNC: 174.3 MG/DL
GFR SERPLBLD CREATININE-BSD FMLA CKD-EPI: >60 MLS/MIN/1.73/M2
GLOBULIN SER-MCNC: 3.3 GM/DL (ref 2.4–3.5)
GLUCOSE SERPL-MCNC: 155 MG/DL (ref 82–115)
HBA1C MFR BLD: 7.7 %
HCT VFR BLD AUTO: 31.2 % (ref 42–52)
HCV AB SERPL QL IA: NONREACTIVE
HGB BLD-MCNC: 8.8 GM/DL (ref 14–18)
HIV 1+2 AB+HIV1 P24 AG SERPL QL IA: NONREACTIVE
HYPOCHROMIA BLD QL SMEAR: ABNORMAL
IMM GRANULOCYTES # BLD AUTO: 0.01 X10(3)/MCL (ref 0–0.04)
IMM GRANULOCYTES NFR BLD AUTO: 0.2 %
LYMPHOCYTES # BLD AUTO: 1.01 X10(3)/MCL (ref 0.6–4.6)
LYMPHOCYTES NFR BLD AUTO: 24.8 %
MCH RBC QN AUTO: 22.5 PG
MCHC RBC AUTO-ENTMCNC: 28.2 MG/DL (ref 33–36)
MCV RBC AUTO: 79.8 FL (ref 80–94)
MICROALBUMIN UR-MCNC: 24.2 UG/ML
MICROALBUMIN/CREAT RATIO PNL UR: 21 MG/GM CR (ref 0–30)
MICROCYTES BLD QL SMEAR: ABNORMAL
MONOCYTES # BLD AUTO: 0.34 X10(3)/MCL (ref 0.1–1.3)
MONOCYTES NFR BLD AUTO: 8.4 %
NEUTROPHILS # BLD AUTO: 2.51 X10(3)/MCL (ref 2.1–9.2)
NEUTROPHILS NFR BLD AUTO: 61.7 %
NRBC BLD AUTO-RTO: 0 %
OVALOCYTES (OLG): ABNORMAL
PLATELET # BLD AUTO: 160 X10(3)/MCL (ref 130–400)
PLATELET # BLD EST: ADEQUATE 10*3/UL
PMV BLD AUTO: 10.5 FL (ref 7.4–10.4)
POIKILOCYTOSIS BLD QL SMEAR: ABNORMAL
POLYCHROMASIA BLD QL SMEAR: ABNORMAL
POTASSIUM SERPL-SCNC: 4 MMOL/L (ref 3.5–5.1)
PROT SERPL-MCNC: 7.1 GM/DL (ref 5.8–7.6)
PSA SERPL-MCNC: 0.99 NG/ML
RBC # BLD AUTO: 3.91 X10(6)/MCL (ref 4.7–6.1)
SODIUM SERPL-SCNC: 143 MMOL/L (ref 136–145)
TARGETS BLD QL SMEAR: SLIGHT
TSH SERPL-ACNC: 4.11 UIU/ML (ref 0.35–4.94)
WBC # SPEC AUTO: 4.1 X10(3)/MCL (ref 4.5–11.5)

## 2023-02-03 PROCEDURE — 84153 ASSAY OF PSA TOTAL: CPT

## 2023-02-03 PROCEDURE — 80053 COMPREHEN METABOLIC PANEL: CPT

## 2023-02-03 PROCEDURE — 86803 HEPATITIS C AB TEST: CPT

## 2023-02-03 PROCEDURE — 85025 COMPLETE CBC W/AUTO DIFF WBC: CPT

## 2023-02-03 PROCEDURE — 84443 ASSAY THYROID STIM HORMONE: CPT

## 2023-02-03 PROCEDURE — 85651 RBC SED RATE NONAUTOMATED: CPT

## 2023-02-03 PROCEDURE — 82043 UR ALBUMIN QUANTITATIVE: CPT

## 2023-02-03 PROCEDURE — 86140 C-REACTIVE PROTEIN: CPT

## 2023-02-03 PROCEDURE — 87389 HIV-1 AG W/HIV-1&-2 AB AG IA: CPT

## 2023-02-03 PROCEDURE — 83036 HEMOGLOBIN GLYCOSYLATED A1C: CPT

## 2023-02-03 PROCEDURE — 36415 COLL VENOUS BLD VENIPUNCTURE: CPT

## 2023-02-06 ENCOUNTER — TELEPHONE (OUTPATIENT)
Dept: FAMILY MEDICINE | Facility: CLINIC | Age: 63
End: 2023-02-06
Payer: MEDICARE

## 2023-02-06 NOTE — TELEPHONE ENCOUNTER
----- Message from BONI Riley sent at 2/3/2023 11:41 AM CST -----  Please schedule follow-up appt with this patient, also I referred him to hematology

## 2023-02-07 ENCOUNTER — OFFICE VISIT (OUTPATIENT)
Dept: FAMILY MEDICINE | Facility: CLINIC | Age: 63
End: 2023-02-07
Payer: MEDICARE

## 2023-02-07 ENCOUNTER — LAB VISIT (OUTPATIENT)
Dept: LAB | Facility: HOSPITAL | Age: 63
End: 2023-02-07
Attending: FAMILY MEDICINE
Payer: MEDICARE

## 2023-02-07 VITALS
WEIGHT: 259.81 LBS | DIASTOLIC BLOOD PRESSURE: 74 MMHG | HEIGHT: 69 IN | RESPIRATION RATE: 20 BRPM | BODY MASS INDEX: 38.48 KG/M2 | HEART RATE: 82 BPM | TEMPERATURE: 97 F | OXYGEN SATURATION: 98 % | SYSTOLIC BLOOD PRESSURE: 144 MMHG

## 2023-02-07 DIAGNOSIS — D50.9 MICROCYTIC ANEMIA: ICD-10-CM

## 2023-02-07 DIAGNOSIS — D50.9 MICROCYTIC ANEMIA: Primary | ICD-10-CM

## 2023-02-07 PROBLEM — M17.12 OSTEOARTHRITIS OF LEFT KNEE: Status: ACTIVE | Noted: 2023-02-07

## 2023-02-07 PROBLEM — M86.679 CHRONIC OSTEOMYELITIS INVOLVING ANKLE AND FOOT: Status: ACTIVE | Noted: 2023-02-07

## 2023-02-07 PROBLEM — G60.0 CHARCOT-MARIE-TOOTH DISEASE TYPE 2: Status: ACTIVE | Noted: 2020-01-17

## 2023-02-07 PROBLEM — B95.8 STAPHYLOCOCCUS INFECTION: Status: ACTIVE | Noted: 2023-02-07

## 2023-02-07 PROBLEM — T84.498A MECHANICAL COMPLICATION OF INTERNAL ORTHOPEDIC DEVICE: Status: ACTIVE | Noted: 2023-02-07

## 2023-02-07 PROBLEM — M86.179: Status: ACTIVE | Noted: 2023-02-07

## 2023-02-07 PROBLEM — M96.0 NONUNION AFTER ARTHRODESIS: Status: ACTIVE | Noted: 2023-02-07

## 2023-02-07 PROBLEM — M21.379 FOOT DROP: Status: ACTIVE | Noted: 2023-02-07

## 2023-02-07 PROBLEM — G60.0 HEREDITARY MOTOR AND SENSORY NEUROPATHY: Status: ACTIVE | Noted: 2023-02-07

## 2023-02-07 PROBLEM — M19.079 OSTEOARTHRITIS OF ANKLE OR FOOT: Status: ACTIVE | Noted: 2023-02-07

## 2023-02-07 PROBLEM — G47.33 OBSTRUCTIVE SLEEP APNEA SYNDROME: Status: ACTIVE | Noted: 2023-02-07

## 2023-02-07 LAB
FERRITIN SERPL-MCNC: 22.35 NG/ML (ref 21.81–274.66)
IRON SATN MFR SERPL: 10 % (ref 20–50)
IRON SERPL-MCNC: 38 UG/DL (ref 65–175)
RET# (OHS): 0.06 (ref 0.03–0.1)
RETICULOCYTE COUNT AUTOMATED (OLG): 1.59 % (ref 1.1–2.1)
TIBC SERPL-MCNC: 348 UG/DL (ref 69–240)
TIBC SERPL-MCNC: 386 UG/DL (ref 250–450)
TRANSFERRIN SERPL-MCNC: 374 MG/DL (ref 163–344)

## 2023-02-07 PROCEDURE — 85045 AUTOMATED RETICULOCYTE COUNT: CPT

## 2023-02-07 PROCEDURE — 83550 IRON BINDING TEST: CPT

## 2023-02-07 PROCEDURE — 36415 COLL VENOUS BLD VENIPUNCTURE: CPT

## 2023-02-07 PROCEDURE — 82728 ASSAY OF FERRITIN: CPT

## 2023-02-07 PROCEDURE — 99213 PR OFFICE/OUTPT VISIT, EST, LEVL III, 20-29 MIN: ICD-10-PCS | Mod: ,,, | Performed by: FAMILY MEDICINE

## 2023-02-07 PROCEDURE — 99213 OFFICE O/P EST LOW 20 MIN: CPT | Mod: ,,, | Performed by: FAMILY MEDICINE

## 2023-02-07 NOTE — PROGRESS NOTES
Patient ID: [id number]     Chief Complaint: abnormal lab results (Last labs were abnormal. Stella Jordan NP referred him to hematology.)        HPI:     Aba Dunbar is a 62 y.o. male here today for abnormal lab results (Last labs were abnormal. Stella Jordan NP referred him to hematology.) No other complaints today.         ----------------------------  Anemia, unspecified  CAD (coronary artery disease)  Charcot-Abbie-Tooth disease type 2  Chronic osteomyelitis of left ankle  Diabetes mellitus, type 2  GERD (gastroesophageal reflux disease)  History of osteomyelitis  Hyperlipidemia  Hypertension  Hypothyroidism, unspecified  Muscular dystrophy  RITO (obstructive sleep apnea)  RITO on CPAP  Osteoarthritis of ankle and foot, right  Osteoarthritis of left knee  Restless legs syndrome (RLS)     Past Surgical History:   Procedure Laterality Date    ANKLE FUSION Left     ANKLE FUSION Right 09/21/2020    Dr Maryjane Chow    ANKLE FUSION  07/06/2020    Dr Maryjane Chow    ANKLE HARDWARE REMOVAL Left 09/21/2020    Dr Maryjane Chow    CARDIAC CATHETERIZATION Left 01/22/2019    Dr Alexander Garcia    CORONARY ANGIOPLASTY WITH STENT PLACEMENT Left 02/17/2016    Dr Clarence De La Rosa    CORONARY ARTERY BYPASS GRAFT  2006    x3    CORONARY STENT PLACEMENT Left 02/17/2016    Dr Clarence De La Rosa    INCISION AND DRAINAGE  10/18/2010    INCISION AND DRAINAGE OF LOWER EXTREMITY Left 07/24/2014    Dr Maryjane Chow    INCISION AND DRAINAGE, BONE ABSCESS OR OSTEOMYELITIS, FOOT Bilateral 2007    TONSILLECTOMY  1962       Review of patient's allergies indicates:   Allergen Reactions    Hydrocodone      Other reaction(s): hyperactive  can't sleep    Hydrocodone-acetaminophen        Outpatient Medications Marked as Taking for the 2/7/23 encounter (Office Visit) with Ventura Parsons, DO   Medication Sig Dispense Refill    aspirin (ECOTRIN) 81 MG EC tablet Take 81 mg by mouth.      atorvastatin (LIPITOR) 40 MG tablet TAKE 1  TABLET BY MOUTH EVERY DAY 90 tablet 1    carvediloL (COREG) 12.5 MG tablet Take 12.5 mg by mouth 2 (two) times daily.      ciprofloxacin HCl (CIPRO) 500 MG tablet Take 500 mg by mouth 2 (two) times daily.      doxycycline (VIBRA-TABS) 100 MG tablet Take 100 mg by mouth 2 (two) times daily.      furosemide (LASIX) 20 MG tablet Take 20 mg by mouth daily as needed.      levothyroxine (SYNTHROID) 50 MCG tablet Take 1 tablet (50 mcg total) by mouth once daily. 90 tablet 0    metFORMIN (GLUCOPHAGE) 1000 MG tablet Take 1 tablet (1,000 mg total) by mouth 2 (two) times daily. 180 tablet 1    ramipriL (ALTACE) 2.5 MG capsule Take 2.5 mg by mouth once daily.         Social History     Socioeconomic History    Marital status: Single   Tobacco Use    Smoking status: Former     Types: Cigarettes    Smokeless tobacco: Never   Substance and Sexual Activity    Alcohol use: Not Currently    Drug use: Never    Sexual activity: Not Currently        Family History   Problem Relation Age of Onset    Stroke Mother     Diabetes Mellitus Mother     Lung cancer Father     Colon cancer Father     Diabetes Mellitus Father         Patient Care Team:  Ventura Parsons DO as PCP - General (Family Medicine)  Alexander Devlin MD as Consulting Physician (Cardiology)  Amy Bennett MD (Ophthalmology)  Cande Davenport MD as Consulting Physician (Infectious Diseases)  Mickey Kingsley Jr., MD as Consulting Physician (Pain Medicine)     Subjective:     Review of Systems   Constitutional:  Negative for chills and fever.   Respiratory:  Negative for shortness of breath.    Cardiovascular:  Negative for chest pain.   Gastrointestinal:  Negative for constipation and diarrhea.   Neurological:  Negative for headaches.   Psychiatric/Behavioral:  The patient does not have insomnia.      See HPI for details  All Other ROS: Negative except as stated in HPI.       Objective:     BP (!) 144/74 (BP Location: Left arm, Patient Position: Sitting, BP Method:  "Large (Automatic))   Pulse 82   Temp 97.3 °F (36.3 °C)   Resp 20   Ht 5' 9" (1.753 m)   Wt 117.8 kg (259 lb 12.8 oz)   SpO2 98%   BMI 38.37 kg/m²     Physical Exam  Vitals reviewed.   Constitutional:       General: He is not in acute distress.     Appearance: Normal appearance. He is not ill-appearing.   Cardiovascular:      Rate and Rhythm: Normal rate and regular rhythm.      Pulses: Normal pulses.      Heart sounds: Normal heart sounds. No murmur heard.    No friction rub. No gallop.   Pulmonary:      Effort: No respiratory distress.      Breath sounds: No wheezing, rhonchi or rales.   Musculoskeletal:         General: No swelling.      Right lower leg: No edema.      Left lower leg: No edema.   Skin:     General: Skin is warm and dry.   Neurological:      General: No focal deficit present.      Mental Status: He is alert.   Psychiatric:         Mood and Affect: Mood normal.         Behavior: Behavior normal.       Assessment/Plan:     1. Microcytic anemia  -     Iron and TIBC; Future; Expected date: 02/07/2023  -     Ferritin; Future; Expected date: 02/07/2023  -     Reticulocytes; Future; Expected date: 02/07/2023    Will review labs ordered today and set up outpatient iron infusions if appropriate.       Follow up:     Follow up in about 3 months (around 5/7/2023) for Follow up. In addition to their scheduled follow up, the patient has also been instructed to follow up on as needed basis.         "

## 2023-02-09 NOTE — PROGRESS NOTES
Subjective:       Patient ID: Aba Dunbar is a 62 y.o. male.    Chief Complaint:  Fatigue    Diagnosis:  Iron deficiency anemia    Current Treatment:  New patient visit    Clinical History:  Patient was referred by his PCP for evaluation of anemia on routine laboratory.  CBC from 2/3/23 showed a hemoglobin of 8.8 and hematocrit 31.2 with an MCV of 79.8 and RDW 19.9.  WBC and platelet counts were normal.  Reticulocyte count was 1.6%.  Additional laboratory drawn 2/7/23 confirmed iron deficiency with a serum iron of 38, TIBC 348, saturation 10% and ferritin level 22 ng/mL.  Review of previous laboratory, shows longstanding anemia dating back at least 2 years.  He has symptoms of PICA for ice.  He denies any abdominal symptoms or complaints.  No weight loss.  No change in his bowel habits, no melena or hematochezia.  He had a screening colonoscopy approximately 6 months ago with small polyps removed.  He has never had an EGD.    Interval History  New patient visit    Review of Systems   Constitutional:  Positive for fatigue. Negative for appetite change and fever.   HENT:  Negative for mouth sores, sore throat and trouble swallowing.    Eyes: Negative.    Respiratory:  Negative for cough, chest tightness and shortness of breath.    Cardiovascular:  Negative for chest pain, palpitations and leg swelling.   Gastrointestinal:  Negative for abdominal distention, abdominal pain, blood in stool, constipation, diarrhea and nausea.   Genitourinary:  Negative for dysuria, frequency and urgency.   Musculoskeletal:  Positive for arthralgias and back pain.   Integumentary:  Negative for rash and mole/lesion.   Neurological:  Negative for dizziness, weakness, numbness and headaches.   Hematological:  Negative for adenopathy. Does not bruise/bleed easily.   Psychiatric/Behavioral: Negative.         PMHx:  HTN, hyperlipidemia, CAD, DM, hypothyroid, arthritis, Charcot-Abbie-Tooth disease, GERD, RLS  PSHx:  Tonsils, bilateral  "ankle fusions, CABG  SH:  Former smoker 1 ppd, quit age 46.  Occasional alcohol use.  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  FH:  His father had colon cancer.    Objective:        /81   Pulse 89   Temp 98.2 °F (36.8 °C)   Resp 18   Ht 5' 9" (1.753 m)   Wt 117.2 kg (258 lb 6.4 oz)   SpO2 97%   BMI 38.16 kg/m²    Physical Exam  Constitutional:       Comments: Morbidly obese white male in [facility], ambulatory with a cane   HENT:      Head: Normocephalic and atraumatic.      Mouth/Throat:      Mouth: Mucous membranes are moist.      Pharynx: Oropharynx is clear. No posterior oropharyngeal erythema.   Eyes:      Extraocular Movements: Extraocular movements intact.      Conjunctiva/sclera: Conjunctivae normal.      Pupils: Pupils are equal, round, and reactive to light.   Cardiovascular:      Rate and Rhythm: Normal rate and regular rhythm.      Heart sounds: No murmur heard.     Comments: Well-healed sternotomy incision  Pulmonary:      Comments: Decreased breath sounds at the bases, otherwise clear  Abdominal:      General: Bowel sounds are normal. There is no distension.      Palpations: Abdomen is soft. There is no mass.      Tenderness: There is no abdominal tenderness.      Comments: Obese.   Musculoskeletal:         General: No swelling or tenderness. Normal range of motion.      Cervical back: Neck supple. No tenderness.   Lymphadenopathy:      Cervical: No cervical adenopathy.   Skin:     General: Skin is warm and dry.      Findings: No rash.   Neurological:      General: No focal deficit present.      Mental Status: He is alert and oriented to person, place, and time.      Cranial Nerves: No cranial nerve deficit.      Motor: No weakness.          LABORATORY  No results found for this or any previous visit (from the past 168 hour(s)).      Latest Reference Range & Units 02/03/23 07:28   WBC 4.5 - 11.5 x10(3)/mcL 4.1 (L)   RBC 4.70 - 6.10 x10(6)/mcL 3.91 (L)   Hemoglobin 14.0 - 18.0 gm/dL " 8.8 (L)   Hematocrit 42.0 - 52.0 % 31.2 (L)   MCV 80.0 - 94.0 fL 79.8 (L)   MCH pg 22.5   MCHC 33.0 - 36.0 mg/dL 28.2 (L)   RDW 11.5 - 17.0 % 19.9 (H)   Platelets 130 - 400 x10(3)/mcL 160   MPV 7.4 - 10.4 fL 10.5 (H)   Platelet Estimate Normal, Adequate  Adequate   Neut % % 61.7   LYMPH % % 24.8   Mono % % 8.4   Eosinophil % % 4.2      Latest Reference Range & Units 02/07/23 08:58   Iron 65 - 175 ug/dL 38 (L)   TIBC 250 - 450 ug/dL 386   Iron Binding Capacity Unsaturated 69 - 240 ug/dL 348 (H)   Transferrin 163 - 344 mg/dL 374 (H)   Ferritin 21.81 - 274.66 ng/mL 22.35   Iron Saturation 20 - 50 % 10 (L)         Assessment:   Iron deficiency anemia      Plan:   Additional laboratory testing by his PCP confirmed iron deficiency anemia.  He has fairly mild symptoms and no indications for transfusion.  He will start ferrous sulfate 325 mg b.i.d. He was instructed to notify me if he has any difficulty tolerating the medication.  RTC in 6 weeks for a follow-up visit with a repeat CBC.  Once his blood counts have improved sufficiently, will consult GI for additional workup including EGD.      KIARRA MCMANUS MD    Other Physicians  Dr. Ventura Garrido

## 2023-02-15 ENCOUNTER — OFFICE VISIT (OUTPATIENT)
Dept: HEMATOLOGY/ONCOLOGY | Facility: CLINIC | Age: 63
End: 2023-02-15
Payer: MEDICARE

## 2023-02-15 ENCOUNTER — TELEPHONE (OUTPATIENT)
Dept: HEMATOLOGY/ONCOLOGY | Facility: CLINIC | Age: 63
End: 2023-02-15

## 2023-02-15 VITALS
HEIGHT: 69 IN | SYSTOLIC BLOOD PRESSURE: 126 MMHG | BODY MASS INDEX: 38.27 KG/M2 | OXYGEN SATURATION: 97 % | HEART RATE: 89 BPM | DIASTOLIC BLOOD PRESSURE: 81 MMHG | RESPIRATION RATE: 18 BRPM | WEIGHT: 258.38 LBS | TEMPERATURE: 98 F

## 2023-02-15 DIAGNOSIS — D50.9 IRON DEFICIENCY ANEMIA, UNSPECIFIED IRON DEFICIENCY ANEMIA TYPE: Primary | ICD-10-CM

## 2023-02-15 DIAGNOSIS — D64.9 ANEMIA, UNSPECIFIED TYPE: ICD-10-CM

## 2023-02-15 PROCEDURE — 99999 PR PBB SHADOW E&M-EST. PATIENT-LVL IV: CPT | Mod: PBBFAC,,, | Performed by: INTERNAL MEDICINE

## 2023-02-15 PROCEDURE — 99204 PR OFFICE/OUTPT VISIT, NEW, LEVL IV, 45-59 MIN: ICD-10-PCS | Mod: S$PBB,,, | Performed by: INTERNAL MEDICINE

## 2023-02-15 PROCEDURE — 99999 PR PBB SHADOW E&M-EST. PATIENT-LVL IV: ICD-10-PCS | Mod: PBBFAC,,, | Performed by: INTERNAL MEDICINE

## 2023-02-15 PROCEDURE — 99204 OFFICE O/P NEW MOD 45 MIN: CPT | Mod: S$PBB,,, | Performed by: INTERNAL MEDICINE

## 2023-02-15 PROCEDURE — 99214 OFFICE O/P EST MOD 30 MIN: CPT | Mod: PBBFAC | Performed by: INTERNAL MEDICINE

## 2023-02-15 NOTE — LETTER
February 15, 2023        Ventura Parsons, DO  [address] [facility] [address]             [facility]  [address], [address]  [facility] [place] [address]  Phone: [phone]   Patient: Aba Dunbar   MR Number: [id number]   YOB: 1960   Date of Visit: 2/15/2023       Dear Dr. Parsons:    Thank you for referring Aba Dunbar to me for evaluation. Below are the relevant portions of my assessment and plan of care.            If you have questions, please do not hesitate to call me. I look forward to following Aba along with you.    Sincerely,      Noe Price MD           CC  No Recipients

## 2023-02-24 ENCOUNTER — TELEPHONE (OUTPATIENT)
Dept: FAMILY MEDICINE | Facility: CLINIC | Age: 63
End: 2023-02-24
Payer: MEDICARE

## 2023-02-24 RX ORDER — FERROUS SULFATE 325(65) MG
325 TABLET ORAL 2 TIMES DAILY
COMMUNITY

## 2023-02-24 NOTE — TELEPHONE ENCOUNTER
----- Message from Ventura Parsons DO sent at 2/22/2023  1:33 PM CST -----  Please inform patient of lab results.    1. Iron levels are low. Can try oral iron supplement or try to get outpatient IV iron infusions.     2.Can also wait to see what hematologist says if patient would like to wait.     3. Other labwork within acceptable ranges.    Thanks,    Dr. Parsons

## 2023-02-24 NOTE — TELEPHONE ENCOUNTER
Patient is being treated by Dr Price for his anemia he is currently taking iron 325mg daily and has a follow up with him in 6 weeks to repeat lab results.

## 2023-02-28 ENCOUNTER — PATIENT MESSAGE (OUTPATIENT)
Dept: ADMINISTRATIVE | Facility: HOSPITAL | Age: 63
End: 2023-02-28
Payer: MEDICARE

## 2023-03-08 ENCOUNTER — PATIENT OUTREACH (OUTPATIENT)
Dept: ADMINISTRATIVE | Facility: HOSPITAL | Age: 63
End: 2023-03-08
Payer: MEDICARE

## 2023-03-08 NOTE — LETTER
AUTHORIZATION FOR RELEASE OF   CONFIDENTIAL INFORMATION    Dear Dr. Gillespie, Fax: [phone]    We are seeing Aba Dunbar, date of birth 1960, in the clinic at [facility]. Ventura Parsons DO is the patient's PCP. Aba Dunbar has an outstanding lab/procedure at the time we reviewed his chart. In order to help keep his health information updated, he has authorized us to request the following medical record(s):        (  )  MAMMOGRAM                                      (  )  COLONOSCOPY      (  )  PAP SMEAR                                          (  )  OUTSIDE LAB RESULTS     (  )  DEXA SCAN                                          ( X )  EYE EXAM            (  )  FOOT EXAM                                          (  )  ENTIRE RECORD     (  )  OUTSIDE IMMUNIZATIONS                 (  )  _______________         Please fax records to [facility], Quinn Quebodeaux, DO, [phone]     If you have any questions you can contact Sarita Allen at [phone].           Patient Name: Aab Dunbar  : 1960  Patient Phone #: [phone]

## 2023-03-08 NOTE — PROGRESS NOTES
Population Health. Out Reach. Reviewing patient's chart for quality metrics. I contacted patient to see if he has had a recent colonoscopy or DM eye exam. Patient reports that his last colonoscopy was done at [facility] by Dr. Erickson. Records request sent to [facility]. Patients last eye exam was done by Dr. Gillespie. Records request sent.

## 2023-03-08 NOTE — LETTER
AUTHORIZATION FOR RELEASE OF   CONFIDENTIAL INFORMATION    Dear Dr. Gillespie,    We are seeing Aba Dunbar, date of birth 1960, in the clinic at [facility]. Ventura Parsons DO is the patient's PCP. Aba Dunbar has an outstanding lab/procedure at the time we reviewed his chart. In order to help keep his health information updated, he has authorized us to request the following medical record(s):        (  )  MAMMOGRAM                                      (  )  COLONOSCOPY      (  )  PAP SMEAR                                          (  )  OUTSIDE LAB RESULTS     (  )  DEXA SCAN                                          ( X ) DIABETIC EYE EXAM            (  )  FOOT EXAM                                          (  )  ENTIRE RECORD     (  )  OUTSIDE IMMUNIZATIONS                 (  )  _______________         Please fax records to [facility], Quinn Quebodeaux, DO, [phone]     If you have any questions you can contact Sarita Allen at [phone].         Patient Name: Aba Dunbar  : 1960  Patient Phone #: [phone]

## 2023-03-08 NOTE — LETTER
AUTHORIZATION FOR RELEASE OF   CONFIDENTIAL INFORMATION    Dear Dr. Erickson, Fax: [phone]    We are seeing Aba Dunbar, date of birth 1960, in the clinic at [facility]. Ventura Parsons DO is the patient's PCP. Aba Dunbar has an outstanding lab/procedure at the time we reviewed his chart. In order to help keep his health information updated, he has authorized us to request the following medical record(s):        (  )  MAMMOGRAM                                      ( X )  COLONOSCOPY      (  )  PAP SMEAR                                          (  )  OUTSIDE LAB RESULTS     (  )  DEXA SCAN                                          (  )  EYE EXAM            (  )  FOOT EXAM                                          (  )  ENTIRE RECORD     (  )  OUTSIDE IMMUNIZATIONS                 (  )  _______________         Please fax records to [facility], Quinn Quebodeaux, DO, [phone]     If you have any questions you can contact Sarita Allen at [phone].           Patient Name: Aba Dunbar  : 1960  Patient Phone #: [phone]

## 2023-03-08 NOTE — LETTER
AUTHORIZATION FOR RELEASE OF   CONFIDENTIAL INFORMATION    Dear Dr. Bennett,    We are seeing Aba Dunbar, date of birth 1960, in the clinic at [facility]. Ventura Parsons DO is the patient's PCP. Aba Dunbar has an outstanding lab/procedure at the time we reviewed his chart. In order to help keep his health information updated, he has authorized us to request the following medical record(s):        (  )  MAMMOGRAM                                      (  )  COLONOSCOPY      (  )  PAP SMEAR                                          (  )  OUTSIDE LAB RESULTS     (  )  DEXA SCAN                                          ( X )  EYE EXAM            (  )  FOOT EXAM                                          (  )  ENTIRE RECORD     (  )  OUTSIDE IMMUNIZATIONS                 (  )  _______________         Please fax records to [facility], Quinn Quebodeaux, DO, [phone]     If you have any questions you can contact Sarita Allen at [phone].            Patient Name: Aba Dunbar  : 1960  Patient Phone #: [phone]

## 2023-03-08 NOTE — LETTER
AUTHORIZATION FOR RELEASE OF   CONFIDENTIAL INFORMATION    Dear [facility] Records Department,    We are seeing Aba Dunbar, date of birth 1960, in the clinic at [facility]. Ventura Parsons DO is the patient's PCP. Aba Dunbar has an outstanding lab/procedure at the time we reviewed his chart. In order to help keep his health information updated, he has authorized us to request the following medical record(s):        (  )  MAMMOGRAM                                      ( X )  COLONOSCOPY      (  )  PAP SMEAR                                          (  )  OUTSIDE LAB RESULTS     (  )  DEXA SCAN                                          (  )  EYE EXAM            (  )  FOOT EXAM                                          (  )  ENTIRE RECORD     (  )  OUTSIDE IMMUNIZATIONS                 (  )  _______________         Please fax records to [facility], Quinn Quebodeaux, DO, [phone]     If you have any questions you can contact Sarita Allen at [phone].       Patient Name: Aba Dunbar  : 1960  Patient Phone #: [phone]

## 2023-03-21 NOTE — PROGRESS NOTES
Subjective:       Patient ID: Aba Dunbar is a 62 y.o. male.    Chief Complaint:  I feel better    Diagnosis:  Iron deficiency anemia    Current Treatment:  Ferrous sulfate 325 mg b.i.d. (started 2/16/23)    Clinical History:  Patient was referred by his PCP for evaluation of anemia on routine laboratory.  CBC from 2/3/23 showed a hemoglobin of 8.8 and hematocrit 31.2 with an MCV of 79.8 and RDW 19.9.  WBC and platelet counts were normal.  Reticulocyte count was 1.6%.  Additional laboratory drawn 2/7/23 confirmed iron deficiency with a serum iron of 38, TIBC 348, saturation 10% and ferritin level 22 ng/mL.  Review of previous laboratory, shows longstanding anemia dating back at least 2 years.  He had symptoms of PICA for ice.  He denied any abdominal symptoms or complaints.  No weight loss.  No change in his bowel habits, no melena or hematochezia.  He had a screening colonoscopy approximately 6 months ago with small polyps removed.  He has never had an EGD.    He was seen as a new patient 2/15/23.  Treatment was recommended with ferrous sulfate 325 mg b.i.d.    Interval History  He returns to the office today by himself for a 6 week follow-up visit.  He is taking OTC ferrous sulfate 325 mg twice a day.  He is tolerating treatment well, no significant GI symptoms or constipation.  He reports improvement in his energy level.  His PICA for ice has resolved.  Laboratory shows improvement in his hemoglobin, hematocrit and MCV level.    Review of Systems   Constitutional:  Positive for fatigue (Improved). Negative for appetite change and fever.   HENT:  Negative for mouth sores, sore throat and trouble swallowing.    Eyes: Negative.    Respiratory:  Negative for cough, chest tightness and shortness of breath.    Cardiovascular:  Negative for chest pain, palpitations and leg swelling.   Gastrointestinal:  Negative for abdominal distention, abdominal pain, blood in stool, constipation, diarrhea and nausea.  "  Genitourinary:  Negative for dysuria, frequency and urgency.   Musculoskeletal:  Positive for arthralgias and back pain.   Integumentary:  Negative for pallor and rash.   Neurological:  Negative for dizziness, weakness, numbness and headaches.   Hematological:  Negative for adenopathy. Does not bruise/bleed easily.   Psychiatric/Behavioral: Negative.         PMHx:  HTN, hyperlipidemia, CAD, DM, hypothyroid, arthritis, Charcot-Abbie-Tooth disease, GERD, RLS  PSHx:  Tonsils, bilateral ankle fusions, CABG  SH:  Former smoker 1 ppd, quit age 46.  Occasional alcohol use.  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  FH:  His father had colon cancer.    Objective:        /82   Pulse 90   Temp 97.6 °F (36.4 °C)   Resp 18   Ht 5' 8" (1.727 m)   Wt 117.6 kg (259 lb 4.8 oz)   SpO2 96%   BMI 39.43 kg/m²    Physical Exam  Constitutional:       Comments: Morbidly obese white male in [facility], ambulatory with a cane   HENT:      Head: Normocephalic and atraumatic.      Mouth/Throat:      Mouth: Mucous membranes are moist.      Pharynx: Oropharynx is clear. No posterior oropharyngeal erythema.   Eyes:      Extraocular Movements: Extraocular movements intact.      Conjunctiva/sclera: Conjunctivae normal.      Pupils: Pupils are equal, round, and reactive to light.   Cardiovascular:      Rate and Rhythm: Normal rate and regular rhythm.      Heart sounds: No murmur heard.     Comments: Well-healed sternotomy incision  Pulmonary:      Comments: Decreased breath sounds at the bases, otherwise clear  Abdominal:      General: Bowel sounds are normal. There is no distension.      Palpations: Abdomen is soft. There is no mass.      Tenderness: There is no abdominal tenderness.      Comments: Obese.   Musculoskeletal:         General: No swelling or tenderness. Normal range of motion.      Cervical back: Neck supple. No tenderness.   Lymphadenopathy:      Cervical: No cervical adenopathy.   Skin:     General: Skin is warm " and dry.      Findings: No rash.   Neurological:      General: No focal deficit present.      Mental Status: He is alert and oriented to person, place, and time.      Cranial Nerves: No cranial nerve deficit.      Motor: No weakness.          LABORATORY  Recent Results (from the past 168 hour(s))   Ferritin    Collection Time: 03/29/23  9:17 AM   Result Value Ref Range    Ferritin Level 27.16 21.81 - 274.66 ng/mL   CBC with Differential    Collection Time: 03/29/23  9:17 AM   Result Value Ref Range    WBC 5.3 4.5 - 11.5 x10(3)/mcL    RBC 4.40 (L) 4.70 - 6.10 x10(6)/mcL    Hgb 10.5 (L) 14.0 - 18.0 g/dL    Hct 37.1 (L) 42.0 - 52.0 %    MCV 84.3 80.0 - 94.0 fL    MCH 23.9 (L) 27.0 - 31.0 pg    MCHC 28.3 (L) 33.0 - 36.0 g/dL    RDW 20.6 (H) 11.5 - 17.0 %    Platelet 158 130 - 400 x10(3)/mcL    MPV 10.6 (H) 7.4 - 10.4 fL    Neut % 64.4 %    Lymph % 22.9 %    Mono % 8.9 %    Eos % 3.4 %    Basophil % 0.4 %    Lymph # 1.21 0.6 - 4.6 x10(3)/mcL    Neut # 3.40 2.1 - 9.2 x10(3)/mcL    Mono # 0.47 0.1 - 1.3 x10(3)/mcL    Eos # 0.18 0 - 0.9 x10(3)/mcL    Baso # 0.02 0 - 0.2 x10(3)/mcL    IG# 0.00 0 - 0.04 x10(3)/mcL    IG% 0.0 %           Assessment:   Iron deficiency anemia      Plan:   Laboratory testing shows improvement in his anemia on oral iron therapy.  He is tolerating treatment well.  Continue ferrous sulfate 325 mg b.i.d.  Refer to GI for additional workup for occult blood loss.  No previous EGD.  RTC in 3 months for a follow-up visit with a repeat CBC, iron profile and ferritin level.      KIARRA MCMANUS MD    Other Physicians  Dr. Ventura Erickson

## 2023-03-29 ENCOUNTER — OFFICE VISIT (OUTPATIENT)
Dept: HEMATOLOGY/ONCOLOGY | Facility: CLINIC | Age: 63
End: 2023-03-29
Payer: MEDICARE

## 2023-03-29 VITALS
SYSTOLIC BLOOD PRESSURE: 131 MMHG | OXYGEN SATURATION: 96 % | HEIGHT: 68 IN | BODY MASS INDEX: 39.3 KG/M2 | WEIGHT: 259.31 LBS | DIASTOLIC BLOOD PRESSURE: 82 MMHG | HEART RATE: 90 BPM | TEMPERATURE: 98 F | RESPIRATION RATE: 18 BRPM

## 2023-03-29 DIAGNOSIS — D50.9 IRON DEFICIENCY ANEMIA, UNSPECIFIED IRON DEFICIENCY ANEMIA TYPE: Primary | ICD-10-CM

## 2023-03-29 PROCEDURE — 99213 PR OFFICE/OUTPT VISIT, EST, LEVL III, 20-29 MIN: ICD-10-PCS | Mod: S$PBB,,, | Performed by: INTERNAL MEDICINE

## 2023-03-29 PROCEDURE — 99999 PR PBB SHADOW E&M-EST. PATIENT-LVL IV: CPT | Mod: PBBFAC,,, | Performed by: INTERNAL MEDICINE

## 2023-03-29 PROCEDURE — 99213 OFFICE O/P EST LOW 20 MIN: CPT | Mod: S$PBB,,, | Performed by: INTERNAL MEDICINE

## 2023-03-29 PROCEDURE — 99999 PR PBB SHADOW E&M-EST. PATIENT-LVL IV: ICD-10-PCS | Mod: PBBFAC,,, | Performed by: INTERNAL MEDICINE

## 2023-03-29 PROCEDURE — 99214 OFFICE O/P EST MOD 30 MIN: CPT | Mod: PBBFAC | Performed by: INTERNAL MEDICINE

## 2023-04-28 DIAGNOSIS — E78.5 HYPERLIPIDEMIA, UNSPECIFIED HYPERLIPIDEMIA TYPE: ICD-10-CM

## 2023-04-28 DIAGNOSIS — E11.9 TYPE 2 DIABETES MELLITUS WITHOUT COMPLICATION, WITHOUT LONG-TERM CURRENT USE OF INSULIN: ICD-10-CM

## 2023-05-01 ENCOUNTER — PATIENT MESSAGE (OUTPATIENT)
Dept: ADMINISTRATIVE | Facility: HOSPITAL | Age: 63
End: 2023-05-01
Payer: MEDICARE

## 2023-05-01 RX ORDER — METFORMIN HYDROCHLORIDE 1000 MG/1
TABLET ORAL
Qty: 180 TABLET | Refills: 1 | Status: SHIPPED | OUTPATIENT
Start: 2023-05-01 | End: 2023-10-17

## 2023-05-01 RX ORDER — ATORVASTATIN CALCIUM 40 MG/1
TABLET, FILM COATED ORAL
Qty: 90 TABLET | Refills: 1 | Status: SHIPPED | OUTPATIENT
Start: 2023-05-01 | End: 2023-10-30

## 2023-05-05 ENCOUNTER — LAB VISIT (OUTPATIENT)
Dept: LAB | Facility: HOSPITAL | Age: 63
End: 2023-05-05
Attending: INTERNAL MEDICINE
Payer: MEDICARE

## 2023-05-05 DIAGNOSIS — M86.372 CHRONIC MULTIFOCAL OSTEOMYELITIS, LEFT ANKLE AND FOOT: Primary | ICD-10-CM

## 2023-05-05 LAB
ALBUMIN SERPL-MCNC: 3.9 G/DL (ref 3.4–4.8)
ALBUMIN/GLOB SERPL: 1.2 RATIO (ref 1.1–2)
ALP SERPL-CCNC: 129 UNIT/L (ref 40–150)
ALT SERPL-CCNC: 39 UNIT/L (ref 0–55)
AST SERPL-CCNC: 38 UNIT/L (ref 5–34)
BASOPHILS # BLD AUTO: 0.02 X10(3)/MCL
BASOPHILS NFR BLD AUTO: 0.4 %
BILIRUBIN DIRECT+TOT PNL SERPL-MCNC: 0.4 MG/DL
BUN SERPL-MCNC: 20 MG/DL (ref 8.4–25.7)
CALCIUM SERPL-MCNC: 9.5 MG/DL (ref 8.8–10)
CHLORIDE SERPL-SCNC: 104 MMOL/L (ref 98–107)
CO2 SERPL-SCNC: 28 MMOL/L (ref 23–31)
CREAT SERPL-MCNC: 1.25 MG/DL (ref 0.73–1.18)
CRP SERPL-MCNC: 4.1 MG/L
EOSINOPHIL # BLD AUTO: 0.22 X10(3)/MCL (ref 0–0.9)
EOSINOPHIL NFR BLD AUTO: 4.8 %
ERYTHROCYTE [DISTWIDTH] IN BLOOD BY AUTOMATED COUNT: 20.5 % (ref 11.5–17)
ERYTHROCYTE [SEDIMENTATION RATE] IN BLOOD: 15 MM/HR (ref 0–15)
GFR SERPLBLD CREATININE-BSD FMLA CKD-EPI: >60 MLS/MIN/1.73/M2
GLOBULIN SER-MCNC: 3.3 GM/DL (ref 2.4–3.5)
GLUCOSE SERPL-MCNC: 143 MG/DL (ref 82–115)
HCT VFR BLD AUTO: 37.7 % (ref 42–52)
HGB BLD-MCNC: 11.2 G/DL (ref 14–18)
IMM GRANULOCYTES # BLD AUTO: 0.01 X10(3)/MCL (ref 0–0.04)
IMM GRANULOCYTES NFR BLD AUTO: 0.2 %
LYMPHOCYTES # BLD AUTO: 1.09 X10(3)/MCL (ref 0.6–4.6)
LYMPHOCYTES NFR BLD AUTO: 23.7 %
MCH RBC QN AUTO: 25.1 PG (ref 27–31)
MCHC RBC AUTO-ENTMCNC: 29.7 G/DL (ref 33–36)
MCV RBC AUTO: 84.3 FL (ref 80–94)
MONOCYTES # BLD AUTO: 0.45 X10(3)/MCL (ref 0.1–1.3)
MONOCYTES NFR BLD AUTO: 9.8 %
NEUTROPHILS # BLD AUTO: 2.8 X10(3)/MCL (ref 2.1–9.2)
NEUTROPHILS NFR BLD AUTO: 61.1 %
NRBC BLD AUTO-RTO: 0 %
PLATELET # BLD AUTO: 129 X10(3)/MCL (ref 130–400)
PMV BLD AUTO: 11 FL (ref 7.4–10.4)
POTASSIUM SERPL-SCNC: 4.5 MMOL/L (ref 3.5–5.1)
PROT SERPL-MCNC: 7.2 GM/DL (ref 5.8–7.6)
RBC # BLD AUTO: 4.47 X10(6)/MCL (ref 4.7–6.1)
SODIUM SERPL-SCNC: 141 MMOL/L (ref 136–145)
WBC # SPEC AUTO: 4.59 X10(3)/MCL (ref 4.5–11.5)

## 2023-05-05 PROCEDURE — 36415 COLL VENOUS BLD VENIPUNCTURE: CPT

## 2023-05-05 PROCEDURE — 85651 RBC SED RATE NONAUTOMATED: CPT

## 2023-05-05 PROCEDURE — 85025 COMPLETE CBC W/AUTO DIFF WBC: CPT

## 2023-05-05 PROCEDURE — 86140 C-REACTIVE PROTEIN: CPT

## 2023-05-05 PROCEDURE — 80053 COMPREHEN METABOLIC PANEL: CPT

## 2023-05-10 ENCOUNTER — OFFICE VISIT (OUTPATIENT)
Dept: FAMILY MEDICINE | Facility: CLINIC | Age: 63
End: 2023-05-10
Payer: MEDICARE

## 2023-05-10 ENCOUNTER — LAB VISIT (OUTPATIENT)
Dept: LAB | Facility: HOSPITAL | Age: 63
End: 2023-05-10
Attending: FAMILY MEDICINE
Payer: MEDICARE

## 2023-05-10 VITALS
SYSTOLIC BLOOD PRESSURE: 131 MMHG | HEIGHT: 68 IN | HEART RATE: 77 BPM | TEMPERATURE: 98 F | RESPIRATION RATE: 18 BRPM | WEIGHT: 257 LBS | OXYGEN SATURATION: 98 % | DIASTOLIC BLOOD PRESSURE: 76 MMHG | BODY MASS INDEX: 38.95 KG/M2

## 2023-05-10 DIAGNOSIS — E11.9 TYPE 2 DIABETES MELLITUS WITHOUT COMPLICATION, WITHOUT LONG-TERM CURRENT USE OF INSULIN: Primary | ICD-10-CM

## 2023-05-10 DIAGNOSIS — E11.9 TYPE 2 DIABETES MELLITUS WITHOUT COMPLICATION, WITHOUT LONG-TERM CURRENT USE OF INSULIN: ICD-10-CM

## 2023-05-10 DIAGNOSIS — E66.01 CLASS 2 SEVERE OBESITY DUE TO EXCESS CALORIES WITH SERIOUS COMORBIDITY AND BODY MASS INDEX (BMI) OF 39.0 TO 39.9 IN ADULT: ICD-10-CM

## 2023-05-10 DIAGNOSIS — M86.679 CHRONIC OSTEOMYELITIS INVOLVING ANKLE AND FOOT, UNSPECIFIED LATERALITY: ICD-10-CM

## 2023-05-10 LAB
EST. AVERAGE GLUCOSE BLD GHB EST-MCNC: 159.9 MG/DL
HBA1C MFR BLD: 7.2 %

## 2023-05-10 PROCEDURE — 99214 PR OFFICE/OUTPT VISIT, EST, LEVL IV, 30-39 MIN: ICD-10-PCS | Mod: ,,, | Performed by: FAMILY MEDICINE

## 2023-05-10 PROCEDURE — 36415 COLL VENOUS BLD VENIPUNCTURE: CPT

## 2023-05-10 PROCEDURE — 83036 HEMOGLOBIN GLYCOSYLATED A1C: CPT

## 2023-05-10 PROCEDURE — 99214 OFFICE O/P EST MOD 30 MIN: CPT | Mod: ,,, | Performed by: FAMILY MEDICINE

## 2023-05-10 NOTE — PROGRESS NOTES
Patient ID: [id number]     Chief Complaint: Follow-up        HPI:     Aba Dunbar is a 62 y.o. male here today for Follow-up. Anemia has improved and patient is feeling better. On long course of doxycycline and ciprofloxacin for chronic osteomyelitis of ankle and foot.       ----------------------------  Anemia, unspecified  CAD (coronary artery disease)  Charcot-Abbie-Tooth disease type 2  Chronic osteomyelitis of left ankle  Diabetes mellitus, type 2  Family history of colon cancer in father  GERD (gastroesophageal reflux disease)  History of osteomyelitis  Hyperlipidemia  Hypertension  Hypothyroidism, unspecified  Muscular dystrophy  RITO (obstructive sleep apnea)  RITO on CPAP  Osteoarthritis of ankle and foot, right  Osteoarthritis of left knee  Restless legs syndrome (RLS)     Past Surgical History:   Procedure Laterality Date    ANKLE FUSION Right 09/21/2020    Dr Maryjane Chow    ANKLE FUSION Left 07/06/2020    Dr Maryjane Chow    ANKLE HARDWARE REMOVAL Left 09/21/2020    Dr Maryjane Chow    CARDIAC CATHETERIZATION Left 01/22/2019    Dr Alexander Garcia    CORONARY ANGIOPLASTY WITH STENT PLACEMENT Left 02/17/2016    Dr Clarence De La Rosa    CORONARY ARTERY BYPASS GRAFT  2006    x3    CORONARY STENT PLACEMENT Left 02/17/2016    Dr Clarence De La Rosa    INCISION AND DRAINAGE  10/18/2010    INCISION AND DRAINAGE OF LOWER EXTREMITY Left 07/24/2014    Dr Maryjane Chow    INCISION AND DRAINAGE, BONE ABSCESS OR OSTEOMYELITIS, FOOT Bilateral 2007    TONSILLECTOMY  1962       Review of patient's allergies indicates:   Allergen Reactions    Hydrocodone      Other reaction(s): hyperactive  can't sleep    Hydrocodone-acetaminophen        Outpatient Medications Marked as Taking for the 5/10/23 encounter (Office Visit) with Ventura Parsons, DO   Medication Sig Dispense Refill    aspirin (ECOTRIN) 81 MG EC tablet Take 81 mg by mouth.      atorvastatin (LIPITOR) 40 MG tablet TAKE 1 TABLET BY MOUTH  EVERY DAY 90 tablet 1    carvediloL (COREG) 12.5 MG tablet Take 12.5 mg by mouth 2 (two) times daily.      ciprofloxacin HCl (CIPRO) 500 MG tablet Take 500 mg by mouth 2 (two) times daily.      doxycycline (VIBRA-TABS) 100 MG tablet Take 100 mg by mouth 2 (two) times daily.      ferrous sulfate (FEOSOL) 325 mg (65 mg iron) Tab tablet Take 325 mg by mouth 2 (two) times daily.      furosemide (LASIX) 20 MG tablet Take 20 mg by mouth daily as needed.      levothyroxine (SYNTHROID) 50 MCG tablet TAKE 1 TABLET BY MOUTH EVERY DAY 90 tablet 0    metFORMIN (GLUCOPHAGE) 1000 MG tablet TAKE 1 TABLET BY MOUTH TWICE A  tablet 1    ramipriL (ALTACE) 2.5 MG capsule Take 2.5 mg by mouth once daily.         Social History     Socioeconomic History    Marital status:    Tobacco Use    Smoking status: Former     Types: Cigarettes    Smokeless tobacco: Never   Substance and Sexual Activity    Alcohol use: Not Currently    Drug use: Never    Sexual activity: Not Currently     Social Determinants of Health     Financial Resource Strain: Low Risk     Difficulty of Paying Living Expenses: Not hard at all   Food Insecurity: No Food Insecurity    Worried About Running Out of Food in the Last Year: Never true    Ran Out of Food in the Last Year: Never true   Transportation Needs: No Transportation Needs    Lack of Transportation (Medical): No    Lack of Transportation (Non-Medical): No   Physical Activity: Inactive    Days of Exercise per Week: 0 days    Minutes of Exercise per Session: 0 min   Stress: No Stress Concern Present    Feeling of Stress : Not at all   Social Connections: Moderately Integrated    Frequency of Communication with Friends and Family: More than three times a week    Frequency of Social Gatherings with Friends and Family: Twice a week    Attends Protestant Services: 1 to 4 times per year    Active Member of Clubs or Organizations: Yes    Attends Club or Organization Meetings: 1 to 4 times per year     "Marital Status:    Housing Stability: Low Risk     Unable to Pay for Housing in the Last Year: No    Number of Places Lived in the Last Year: 1    Unstable Housing in the Last Year: No        Family History   Problem Relation Age of Onset    Stroke Mother     Diabetes Mellitus Mother     Colon cancer Father     Diabetes Mellitus Father         Patient Care Team:  Ventura Parsons DO as PCP - General (Family Medicine)  Alexander Devlin MD as Consulting Physician (Cardiology)  Amy Bennett MD (Ophthalmology)  Cande Davenport MD as Consulting Physician (Infectious Diseases)  Mickey Kingsley Jr., MD as Consulting Physician (Pain Medicine)  Sarita Allen MA as Care Coordinator  Romel Erickson MD as Consulting Physician (Gastroenterology)  Noe Price MD as Consulting Physician (Oncology)     Subjective:     Review of Systems   Constitutional:  Negative for chills and fever.   Respiratory:  Negative for shortness of breath.    Cardiovascular:  Negative for chest pain.   Gastrointestinal:  Negative for constipation and diarrhea.   Neurological:  Negative for dizziness and headaches.   Psychiatric/Behavioral:  The patient does not have insomnia.      See HPI for details  All Other ROS: Negative except as stated in HPI.       Objective:     /76   Pulse 77   Temp 98.2 °F (36.8 °C) (Tympanic)   Resp 18   Ht 5' 7.72" (1.72 m)   Wt 116.6 kg (257 lb)   SpO2 98%   BMI 39.40 kg/m²     Physical Exam  Vitals reviewed.   Constitutional:       General: He is not in acute distress.     Appearance: Normal appearance. He is not ill-appearing.   Cardiovascular:      Rate and Rhythm: Normal rate and regular rhythm.      Pulses: Normal pulses.      Heart sounds: Normal heart sounds. No murmur heard.    No friction rub. No gallop.   Pulmonary:      Effort: No respiratory distress.      Breath sounds: No wheezing, rhonchi or rales.   Musculoskeletal:         General: No swelling.      Right lower leg: No " edema.      Left lower leg: No edema.   Skin:     General: Skin is warm and dry.   Neurological:      General: No focal deficit present.      Mental Status: He is alert.   Psychiatric:         Mood and Affect: Mood normal.         Behavior: Behavior normal.       Assessment/Plan:     1. Type 2 diabetes mellitus without complication, without long-term current use of insulin  -     Hemoglobin A1C; Future; Expected date: 05/10/2023    2. Chronic osteomyelitis involving ankle and foot, unspecified laterality  -on ciprofloxacin and doxycycline long courses, managed by infectious disease.   3. Class 2 severe obesity due to excess calories with serious comorbidity and body mass index (BMI) of 39.0 to 39.9 in adult  -encouraged diet and lifestyle changes         Follow up:     Follow up in about 6 months (around 11/10/2023) for Medicare Wellness. In addition to their scheduled follow up, the patient has also been instructed to follow up on as needed basis.

## 2023-05-15 ENCOUNTER — PATIENT MESSAGE (OUTPATIENT)
Dept: ADMINISTRATIVE | Facility: HOSPITAL | Age: 63
End: 2023-05-15
Payer: MEDICARE

## 2023-06-08 LAB
LEFT EYE DM RETINOPATHY: NEGATIVE
RIGHT EYE DM RETINOPATHY: NEGATIVE

## 2023-06-09 ENCOUNTER — DOCUMENTATION ONLY (OUTPATIENT)
Dept: FAMILY MEDICINE | Facility: CLINIC | Age: 63
End: 2023-06-09
Payer: MEDICARE

## 2023-06-09 DIAGNOSIS — E03.9 HYPOTHYROIDISM, UNSPECIFIED TYPE: ICD-10-CM

## 2023-06-09 RX ORDER — LEVOTHYROXINE SODIUM 50 UG/1
TABLET ORAL
Qty: 90 TABLET | Refills: 1 | Status: SHIPPED | OUTPATIENT
Start: 2023-06-09 | End: 2023-12-08

## 2023-06-23 NOTE — PROGRESS NOTES
Subjective:       Patient ID: Aba Dunbar is a 63 y.o. male.    Chief Complaint:  Chronic back pain    Diagnosis:  Iron deficiency anemia    Current Treatment:  Ferrous sulfate 325 mg b.i.d. (started 2/16/23)    Clinical History:  Patient was referred by his PCP for evaluation of anemia on routine laboratory.  CBC from 2/3/23 showed a hemoglobin of 8.8 and hematocrit 31.2 with an MCV of 79.8 and RDW 19.9.  WBC and platelet counts were normal.  Reticulocyte count was 1.6%.  Additional laboratory drawn 2/7/23 confirmed iron deficiency with a serum iron of 38, TIBC 348, saturation 10% and ferritin level 22 ng/mL.  Review of previous laboratory, shows longstanding anemia dating back at least 2 years.  He had symptoms of PICA for ice.  He denied any abdominal symptoms or complaints.  No weight loss.  No change in his bowel habits, no melena or hematochezia.  He had a screening colonoscopy approximately 6 months ago with small polyps removed.  He has never had an EGD.    He was seen as a new patient 2/15/23.  Treatment was recommended with ferrous sulfate 325 mg b.i.d. His anemia improved on treatment his PICA symptoms resolved.    Interval History  He returns to clinic today by himself for a three-month follow-up.  He remains on ferrous sulfate 325 mg b.i.d..  He continues to tolerate treatment well.  He denies any constipation or other GI side effects.  He denies any melena or hematochezia.  Since his last office visit, he had an EGD.  He thinks he may have had a small ulcer.  I have requested the report for further review.  His hemoglobin level shows further improvement.  Iron profile and ferritin level are pending.      Review of Systems   Constitutional:  Negative for appetite change, fatigue and fever.   HENT:  Negative for mouth sores, sore throat and trouble swallowing.    Eyes: Negative.    Respiratory:  Negative for cough and shortness of breath.    Cardiovascular:  Negative for chest pain, palpitations and  "leg swelling.   Gastrointestinal:  Negative for abdominal distention, abdominal pain, blood in stool, constipation, diarrhea and nausea.   Genitourinary:  Negative for dysuria, frequency and urgency.   Musculoskeletal:  Positive for arthralgias and back pain (Chronic, lower back).   Integumentary:  Negative for pallor and rash.   Neurological:  Negative for dizziness, weakness, numbness and headaches.   Hematological:  Negative for adenopathy. Does not bruise/bleed easily.   Psychiatric/Behavioral: Negative.         PMHx:  HTN, hyperlipidemia, CAD, DM, hypothyroid, arthritis, Charcot-Abbie-Tooth disease, GERD, RLS  PSHx:  Tonsils, bilateral ankle fusions, CABG  SH:  Former smoker 1 ppd, quit age 46.  Occasional alcohol use.  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  FH:  His father had colon cancer.    Objective:        /81   Pulse 81   Temp 99 °F (37.2 °C)   Resp 19   Ht 5' 8" (1.727 m)   Wt 115.6 kg (254 lb 14.4 oz)   SpO2 96%   BMI 38.76 kg/m²    Physical Exam  Constitutional:       Comments: Morbidly obese white male in [facility], ambulatory with a cane   HENT:      Head: Normocephalic and atraumatic.      Mouth/Throat:      Mouth: Mucous membranes are moist.      Pharynx: Oropharynx is clear. No posterior oropharyngeal erythema.   Eyes:      Extraocular Movements: Extraocular movements intact.      Conjunctiva/sclera: Conjunctivae normal.      Pupils: Pupils are equal, round, and reactive to light.   Cardiovascular:      Rate and Rhythm: Normal rate and regular rhythm.      Heart sounds: No murmur heard.     Comments: Well-healed sternotomy incision  Pulmonary:      Comments: Decreased breath sounds at the bases, otherwise clear  Abdominal:      General: Bowel sounds are normal. There is no distension.      Palpations: Abdomen is soft. There is no mass.      Tenderness: There is no abdominal tenderness.      Comments: Obese.   Musculoskeletal:         General: No swelling or tenderness. Normal " range of motion.      Cervical back: Neck supple. No tenderness.   Lymphadenopathy:      Cervical: No cervical adenopathy.   Skin:     General: Skin is warm and dry.      Findings: No rash.   Neurological:      General: No focal deficit present.      Mental Status: He is alert and oriented to person, place, and time.      Cranial Nerves: No cranial nerve deficit.      Motor: No weakness.          LABORATORY  Recent Results (from the past 168 hour(s))   CBC with Differential    Collection Time: 06/29/23 10:14 AM   Result Value Ref Range    WBC 5.97 4.50 - 11.50 x10(3)/mcL    RBC 4.77 4.70 - 6.10 x10(6)/mcL    Hgb 12.5 (L) 14.0 - 18.0 g/dL    Hct 42.0 42.0 - 52.0 %    MCV 88.1 80.0 - 94.0 fL    MCH 26.2 (L) 27.0 - 31.0 pg    MCHC 29.8 (L) 33.0 - 36.0 g/dL    RDW 18.2 (H) 11.5 - 17.0 %    Platelet 122 (L) 130 - 400 x10(3)/mcL    MPV 10.9 (H) 7.4 - 10.4 fL    Neut % 61.6 %    Lymph % 23.6 %    Mono % 10.2 %    Eos % 4.2 %    Basophil % 0.2 %    Lymph # 1.41 0.6 - 4.6 x10(3)/mcL    Neut # 3.68 2.1 - 9.2 x10(3)/mcL    Mono # 0.61 0.1 - 1.3 x10(3)/mcL    Eos # 0.25 0 - 0.9 x10(3)/mcL    Baso # 0.01 <=0.2 x10(3)/mcL    IG# 0.01 0 - 0.04 x10(3)/mcL    IG% 0.2 %          Assessment:   Iron deficiency anemia - Improved      Plan:   EGD report requested for further review.    Anemia has resolved.  Iron profile pending.  Continue FeSO4 b.i.d., tolerating well.    RTC in 4 months for a follow-up visit with a CBC and serum ferritin level.      KIARRA MCMANUS MD    Other Physicians  Dr. Ventura Erickson

## 2023-06-29 ENCOUNTER — OFFICE VISIT (OUTPATIENT)
Dept: HEMATOLOGY/ONCOLOGY | Facility: CLINIC | Age: 63
End: 2023-06-29
Payer: MEDICARE

## 2023-06-29 ENCOUNTER — TELEPHONE (OUTPATIENT)
Dept: HEMATOLOGY/ONCOLOGY | Facility: CLINIC | Age: 63
End: 2023-06-29

## 2023-06-29 VITALS
TEMPERATURE: 99 F | HEIGHT: 68 IN | SYSTOLIC BLOOD PRESSURE: 123 MMHG | RESPIRATION RATE: 19 BRPM | HEART RATE: 81 BPM | DIASTOLIC BLOOD PRESSURE: 81 MMHG | WEIGHT: 254.88 LBS | BODY MASS INDEX: 38.63 KG/M2 | OXYGEN SATURATION: 96 %

## 2023-06-29 DIAGNOSIS — D50.9 IRON DEFICIENCY ANEMIA, UNSPECIFIED IRON DEFICIENCY ANEMIA TYPE: Primary | ICD-10-CM

## 2023-06-29 PROCEDURE — 99213 PR OFFICE/OUTPT VISIT, EST, LEVL III, 20-29 MIN: ICD-10-PCS | Mod: S$PBB,,, | Performed by: INTERNAL MEDICINE

## 2023-06-29 PROCEDURE — 99214 OFFICE O/P EST MOD 30 MIN: CPT | Mod: PBBFAC | Performed by: INTERNAL MEDICINE

## 2023-06-29 PROCEDURE — 99999 PR PBB SHADOW E&M-EST. PATIENT-LVL IV: CPT | Mod: PBBFAC,,, | Performed by: INTERNAL MEDICINE

## 2023-06-29 PROCEDURE — 99213 OFFICE O/P EST LOW 20 MIN: CPT | Mod: S$PBB,,, | Performed by: INTERNAL MEDICINE

## 2023-06-29 PROCEDURE — 99999 PR PBB SHADOW E&M-EST. PATIENT-LVL IV: ICD-10-PCS | Mod: PBBFAC,,, | Performed by: INTERNAL MEDICINE

## 2023-07-24 ENCOUNTER — PATIENT MESSAGE (OUTPATIENT)
Dept: ADMINISTRATIVE | Facility: HOSPITAL | Age: 63
End: 2023-07-24
Payer: MEDICAID

## 2023-08-03 ENCOUNTER — LAB VISIT (OUTPATIENT)
Dept: LAB | Facility: HOSPITAL | Age: 63
End: 2023-08-03
Attending: INTERNAL MEDICINE
Payer: MEDICARE

## 2023-08-03 DIAGNOSIS — M86.372 CHRONIC MULTIFOCAL OSTEOMYELITIS, LEFT ANKLE AND FOOT: Primary | ICD-10-CM

## 2023-08-03 LAB
ALBUMIN SERPL-MCNC: 3.8 G/DL (ref 3.4–4.8)
ALBUMIN/GLOB SERPL: 1 RATIO (ref 1.1–2)
ALP SERPL-CCNC: 113 UNIT/L (ref 40–150)
ALT SERPL-CCNC: 34 UNIT/L (ref 0–55)
AST SERPL-CCNC: 42 UNIT/L (ref 5–34)
BASOPHILS # BLD AUTO: 0.02 X10(3)/MCL
BASOPHILS NFR BLD AUTO: 0.4 %
BILIRUBIN DIRECT+TOT PNL SERPL-MCNC: 0.5 MG/DL
BUN SERPL-MCNC: 12 MG/DL (ref 8.4–25.7)
CALCIUM SERPL-MCNC: 9.6 MG/DL (ref 8.8–10)
CHLORIDE SERPL-SCNC: 102 MMOL/L (ref 98–107)
CO2 SERPL-SCNC: 26 MMOL/L (ref 23–31)
CREAT SERPL-MCNC: 1.1 MG/DL (ref 0.73–1.18)
CRP SERPL-MCNC: 2.8 MG/L
EOSINOPHIL # BLD AUTO: 0.18 X10(3)/MCL (ref 0–0.9)
EOSINOPHIL NFR BLD AUTO: 3.9 %
ERYTHROCYTE [DISTWIDTH] IN BLOOD BY AUTOMATED COUNT: 19.2 % (ref 11.5–17)
ERYTHROCYTE [SEDIMENTATION RATE] IN BLOOD: 18 MM/HR (ref 0–15)
GFR SERPLBLD CREATININE-BSD FMLA CKD-EPI: >60 MLS/MIN/1.73/M2
GLOBULIN SER-MCNC: 3.7 GM/DL (ref 2.4–3.5)
GLUCOSE SERPL-MCNC: 199 MG/DL (ref 82–115)
HCT VFR BLD AUTO: 41.1 % (ref 42–52)
HGB BLD-MCNC: 12.8 G/DL (ref 14–18)
IMM GRANULOCYTES # BLD AUTO: 0.02 X10(3)/MCL (ref 0–0.04)
IMM GRANULOCYTES NFR BLD AUTO: 0.4 %
LYMPHOCYTES # BLD AUTO: 1.08 X10(3)/MCL (ref 0.6–4.6)
LYMPHOCYTES NFR BLD AUTO: 23.3 %
MCH RBC QN AUTO: 27.5 PG (ref 27–31)
MCHC RBC AUTO-ENTMCNC: 31.1 G/DL (ref 33–36)
MCV RBC AUTO: 88.2 FL (ref 80–94)
MONOCYTES # BLD AUTO: 0.44 X10(3)/MCL (ref 0.1–1.3)
MONOCYTES NFR BLD AUTO: 9.5 %
NEUTROPHILS # BLD AUTO: 2.89 X10(3)/MCL (ref 2.1–9.2)
NEUTROPHILS NFR BLD AUTO: 62.5 %
NRBC BLD AUTO-RTO: 0 %
PLATELET # BLD AUTO: 111 X10(3)/MCL (ref 130–400)
PMV BLD AUTO: 12.1 FL (ref 7.4–10.4)
POTASSIUM SERPL-SCNC: 3.9 MMOL/L (ref 3.5–5.1)
PROT SERPL-MCNC: 7.5 GM/DL (ref 5.8–7.6)
RBC # BLD AUTO: 4.66 X10(6)/MCL (ref 4.7–6.1)
SODIUM SERPL-SCNC: 142 MMOL/L (ref 136–145)
WBC # SPEC AUTO: 4.63 X10(3)/MCL (ref 4.5–11.5)

## 2023-08-03 PROCEDURE — 86140 C-REACTIVE PROTEIN: CPT

## 2023-08-03 PROCEDURE — 85652 RBC SED RATE AUTOMATED: CPT

## 2023-08-03 PROCEDURE — 80053 COMPREHEN METABOLIC PANEL: CPT

## 2023-08-03 PROCEDURE — 85025 COMPLETE CBC W/AUTO DIFF WBC: CPT

## 2023-08-03 PROCEDURE — 36415 COLL VENOUS BLD VENIPUNCTURE: CPT

## 2023-08-09 DIAGNOSIS — E03.9 HYPOTHYROIDISM, UNSPECIFIED TYPE: ICD-10-CM

## 2023-08-09 DIAGNOSIS — D50.9 MICROCYTIC ANEMIA: ICD-10-CM

## 2023-08-09 DIAGNOSIS — E11.9 TYPE 2 DIABETES MELLITUS WITHOUT COMPLICATION, WITHOUT LONG-TERM CURRENT USE OF INSULIN: Primary | ICD-10-CM

## 2023-08-09 DIAGNOSIS — E78.5 HYPERLIPIDEMIA, UNSPECIFIED HYPERLIPIDEMIA TYPE: ICD-10-CM

## 2023-08-09 DIAGNOSIS — I10 HYPERTENSION, UNSPECIFIED TYPE: ICD-10-CM

## 2023-08-29 ENCOUNTER — PATIENT MESSAGE (OUTPATIENT)
Dept: ADMINISTRATIVE | Facility: HOSPITAL | Age: 63
End: 2023-08-29
Payer: MEDICARE

## 2023-09-11 DIAGNOSIS — I10 HYPERTENSION, UNSPECIFIED TYPE: Primary | ICD-10-CM

## 2023-09-11 RX ORDER — FUROSEMIDE 20 MG/1
20 TABLET ORAL DAILY PRN
Qty: 30 TABLET | Refills: 2 | Status: SHIPPED | OUTPATIENT
Start: 2023-09-11 | End: 2023-09-21

## 2023-09-21 ENCOUNTER — DOCUMENTATION ONLY (OUTPATIENT)
Dept: FAMILY MEDICINE | Facility: CLINIC | Age: 63
End: 2023-09-21
Payer: MEDICARE

## 2023-09-21 DIAGNOSIS — I10 HYPERTENSION, UNSPECIFIED TYPE: ICD-10-CM

## 2023-09-21 RX ORDER — FUROSEMIDE 20 MG/1
20 TABLET ORAL DAILY PRN
Qty: 90 TABLET | Refills: 3 | Status: SHIPPED | OUTPATIENT
Start: 2023-09-21

## 2023-10-13 ENCOUNTER — OFFICE VISIT (OUTPATIENT)
Dept: FAMILY MEDICINE | Facility: CLINIC | Age: 63
End: 2023-10-13
Payer: MEDICARE

## 2023-10-13 VITALS
BODY MASS INDEX: 37.74 KG/M2 | SYSTOLIC BLOOD PRESSURE: 137 MMHG | RESPIRATION RATE: 18 BRPM | WEIGHT: 249 LBS | HEIGHT: 68 IN | HEART RATE: 65 BPM | DIASTOLIC BLOOD PRESSURE: 78 MMHG | OXYGEN SATURATION: 98 % | TEMPERATURE: 97 F

## 2023-10-13 DIAGNOSIS — H61.23 IMPACTED CERUMEN OF BOTH EARS: Primary | ICD-10-CM

## 2023-10-13 DIAGNOSIS — Z23 FLU VACCINE NEED: ICD-10-CM

## 2023-10-13 DIAGNOSIS — H72.93 PERFORATION OF BOTH TYMPANIC MEMBRANES: ICD-10-CM

## 2023-10-13 PROCEDURE — 69210 REMOVE IMPACTED EAR WAX UNI: CPT | Mod: ,,, | Performed by: FAMILY MEDICINE

## 2023-10-13 PROCEDURE — 4010F PR ACE/ARB THEARPY RXD/TAKEN: ICD-10-PCS | Mod: CPTII,,, | Performed by: FAMILY MEDICINE

## 2023-10-13 PROCEDURE — 3066F NEPHROPATHY DOC TX: CPT | Mod: CPTII,,, | Performed by: FAMILY MEDICINE

## 2023-10-13 PROCEDURE — 90694 FLU VACCINE - QUADRIVALENT - ADJUVANTED: ICD-10-PCS | Mod: ,,, | Performed by: FAMILY MEDICINE

## 2023-10-13 PROCEDURE — 4010F ACE/ARB THERAPY RXD/TAKEN: CPT | Mod: CPTII,,, | Performed by: FAMILY MEDICINE

## 2023-10-13 PROCEDURE — G0008 FLU VACCINE - QUADRIVALENT - ADJUVANTED: ICD-10-PCS | Mod: ,,, | Performed by: FAMILY MEDICINE

## 2023-10-13 PROCEDURE — 3075F PR MOST RECENT SYSTOLIC BLOOD PRESS GE 130-139MM HG: ICD-10-PCS | Mod: CPTII,,, | Performed by: FAMILY MEDICINE

## 2023-10-13 PROCEDURE — 3008F BODY MASS INDEX DOCD: CPT | Mod: CPTII,,, | Performed by: FAMILY MEDICINE

## 2023-10-13 PROCEDURE — 3051F PR MOST RECENT HEMOGLOBIN A1C LEVEL 7.0 - < 8.0%: ICD-10-PCS | Mod: CPTII,,, | Performed by: FAMILY MEDICINE

## 2023-10-13 PROCEDURE — 1159F MED LIST DOCD IN RCRD: CPT | Mod: CPTII,,, | Performed by: FAMILY MEDICINE

## 2023-10-13 PROCEDURE — 3008F PR BODY MASS INDEX (BMI) DOCUMENTED: ICD-10-PCS | Mod: CPTII,,, | Performed by: FAMILY MEDICINE

## 2023-10-13 PROCEDURE — 1160F RVW MEDS BY RX/DR IN RCRD: CPT | Mod: CPTII,,, | Performed by: FAMILY MEDICINE

## 2023-10-13 PROCEDURE — 3061F NEG MICROALBUMINURIA REV: CPT | Mod: CPTII,,, | Performed by: FAMILY MEDICINE

## 2023-10-13 PROCEDURE — 3078F PR MOST RECENT DIASTOLIC BLOOD PRESSURE < 80 MM HG: ICD-10-PCS | Mod: CPTII,,, | Performed by: FAMILY MEDICINE

## 2023-10-13 PROCEDURE — 3078F DIAST BP <80 MM HG: CPT | Mod: CPTII,,, | Performed by: FAMILY MEDICINE

## 2023-10-13 PROCEDURE — 1160F PR REVIEW ALL MEDS BY PRESCRIBER/CLIN PHARMACIST DOCUMENTED: ICD-10-PCS | Mod: CPTII,,, | Performed by: FAMILY MEDICINE

## 2023-10-13 PROCEDURE — 3075F SYST BP GE 130 - 139MM HG: CPT | Mod: CPTII,,, | Performed by: FAMILY MEDICINE

## 2023-10-13 PROCEDURE — 2023F PR DILATED RETINAL EXAM W/O EVID OF RETINOPATHY: ICD-10-PCS | Mod: CPTII,,, | Performed by: FAMILY MEDICINE

## 2023-10-13 PROCEDURE — 90694 VACC AIIV4 NO PRSRV 0.5ML IM: CPT | Mod: ,,, | Performed by: FAMILY MEDICINE

## 2023-10-13 PROCEDURE — 3051F HG A1C>EQUAL 7.0%<8.0%: CPT | Mod: CPTII,,, | Performed by: FAMILY MEDICINE

## 2023-10-13 PROCEDURE — 69210 PR REMOVAL IMPACTED CERUMEN REQUIRING INSTRUMENTATION, UNILATERAL: ICD-10-PCS | Mod: ,,, | Performed by: FAMILY MEDICINE

## 2023-10-13 PROCEDURE — 99213 PR OFFICE/OUTPT VISIT, EST, LEVL III, 20-29 MIN: ICD-10-PCS | Mod: 25,,, | Performed by: FAMILY MEDICINE

## 2023-10-13 PROCEDURE — G0008 ADMIN INFLUENZA VIRUS VAC: HCPCS | Mod: ,,, | Performed by: FAMILY MEDICINE

## 2023-10-13 PROCEDURE — 3066F PR DOCUMENTATION OF TREATMENT FOR NEPHROPATHY: ICD-10-PCS | Mod: CPTII,,, | Performed by: FAMILY MEDICINE

## 2023-10-13 PROCEDURE — 3061F PR NEG MICROALBUMINURIA RESULT DOCUMENTED/REVIEW: ICD-10-PCS | Mod: CPTII,,, | Performed by: FAMILY MEDICINE

## 2023-10-13 PROCEDURE — 1159F PR MEDICATION LIST DOCUMENTED IN MEDICAL RECORD: ICD-10-PCS | Mod: CPTII,,, | Performed by: FAMILY MEDICINE

## 2023-10-13 PROCEDURE — 2023F DILAT RTA XM W/O RTNOPTHY: CPT | Mod: CPTII,,, | Performed by: FAMILY MEDICINE

## 2023-10-13 PROCEDURE — 99213 OFFICE O/P EST LOW 20 MIN: CPT | Mod: 25,,, | Performed by: FAMILY MEDICINE

## 2023-10-13 RX ORDER — PANTOPRAZOLE SODIUM 40 MG/1
40 TABLET, DELAYED RELEASE ORAL DAILY
COMMUNITY
Start: 2023-08-07

## 2023-10-14 NOTE — PROGRESS NOTES
Patient ID: [id number]     Chief Complaint: Ear Fullness (Left worse than right, saw Audiologist for hearing aids and they told him to come to PCP for ear blockage) and Flu Vaccine        HPI:     Aba Dunbar is a 63 y.o. male here today for Ear Fullness (Left worse than right, saw Audiologist for hearing aids and they told him to come to PCP for ear blockage) and Flu Vaccine No other complaints today.         ----------------------------  Anemia, unspecified  CAD (coronary artery disease)  Charcot-Abbie-Tooth disease type 2  Chronic osteomyelitis of left ankle  Diabetes mellitus, type 2  Family history of colon cancer in father  GERD (gastroesophageal reflux disease)  History of osteomyelitis  Hyperlipidemia  Hypertension  Hypothyroidism, unspecified  Muscular dystrophy  RITO (obstructive sleep apnea)  RITO on CPAP  Osteoarthritis of ankle and foot, right  Osteoarthritis of left knee  Restless legs syndrome (RLS)     Past Surgical History:   Procedure Laterality Date    ANKLE FUSION Right 09/21/2020    Dr Maryjane Chow    ANKLE FUSION Left 07/06/2020    Dr Maryjane Chow    ANKLE HARDWARE REMOVAL Left 09/21/2020    Dr Maryjane Chow    CARDIAC CATHETERIZATION Left 01/22/2019    Dr Alexander Garcia    CORONARY ANGIOPLASTY WITH STENT PLACEMENT Left 02/17/2016    Dr Clarence De La Rosa    CORONARY ARTERY BYPASS GRAFT  2006    x3    CORONARY STENT PLACEMENT Left 02/17/2016    Dr Clarence De La Rosa    INCISION AND DRAINAGE  10/18/2010    INCISION AND DRAINAGE OF LOWER EXTREMITY Left 07/24/2014    Dr Maryjane Chwo    INCISION AND DRAINAGE, BONE ABSCESS OR OSTEOMYELITIS, FOOT Bilateral 2007    TONSILLECTOMY  1962       Review of patient's allergies indicates:   Allergen Reactions    Hydrocodone      Other reaction(s): hyperactive  can't sleep    Hydrocodone-acetaminophen        Outpatient Medications Marked as Taking for the 10/13/23 encounter (Office Visit) with Ventura Parsons, DO   Medication Sig  Dispense Refill    aspirin (ECOTRIN) 81 MG EC tablet Take 81 mg by mouth.      atorvastatin (LIPITOR) 40 MG tablet TAKE 1 TABLET BY MOUTH EVERY DAY 90 tablet 1    carvediloL (COREG) 12.5 MG tablet Take 12.5 mg by mouth 2 (two) times daily.      ciprofloxacin HCl (CIPRO) 500 MG tablet Take 500 mg by mouth 2 (two) times daily.      doxycycline (VIBRA-TABS) 100 MG tablet Take 100 mg by mouth 2 (two) times daily.      ferrous sulfate (FEOSOL) 325 mg (65 mg iron) Tab tablet Take 325 mg by mouth 2 (two) times daily.      furosemide (LASIX) 20 MG tablet TAKE 1 TABLET BY MOUTH EVERY DAY AS NEEDED 90 tablet 3    levothyroxine (SYNTHROID) 50 MCG tablet TAKE 1 TABLET BY MOUTH EVERY DAY 90 tablet 1    metFORMIN (GLUCOPHAGE) 1000 MG tablet TAKE 1 TABLET BY MOUTH TWICE A  tablet 1    pantoprazole (PROTONIX) 40 MG tablet Take 40 mg by mouth once daily.      ramipriL (ALTACE) 2.5 MG capsule Take 2.5 mg by mouth once daily.         Social History     Socioeconomic History    Marital status:    Tobacco Use    Smoking status: Former     Types: Cigarettes    Smokeless tobacco: Never   Substance and Sexual Activity    Alcohol use: Not Currently    Drug use: Never    Sexual activity: Not Currently     Social Determinants of Health     Financial Resource Strain: Low Risk  (5/10/2023)    Overall Financial Resource Strain (CARDIA)     Difficulty of Paying Living Expenses: Not hard at all   Food Insecurity: No Food Insecurity (5/10/2023)    Hunger Vital Sign     Worried About Running Out of Food in the Last Year: Never true     Ran Out of Food in the Last Year: Never true   Transportation Needs: No Transportation Needs (5/10/2023)    PRAPARE - Transportation     Lack of Transportation (Medical): No     Lack of Transportation (Non-Medical): No   Physical Activity: Inactive (5/10/2023)    Exercise Vital Sign     Days of Exercise per Week: 0 days     Minutes of Exercise per Session: 0 min   Stress: No Stress Concern Present  (5/10/2023)    [employer] [place] [employer] - Occupational Stress Questionnaire     Feeling of Stress : Not at all   Social Connections: Moderately Integrated (5/10/2023)    Social Connection and Isolation Panel [NHANES]     Frequency of Communication with Friends and Family: More than three times a week     Frequency of Social Gatherings with Friends and Family: Twice a week     Attends Anabaptist Services: 1 to 4 times per year     Active Member of Clubs or Organizations: Yes     Attends Club or Organization Meetings: 1 to 4 times per year     Marital Status:    Housing Stability: Low Risk  (5/10/2023)    Housing Stability Vital Sign     Unable to Pay for Housing in the Last Year: No     Number of Places Lived in the Last Year: 1     Unstable Housing in the Last Year: No        Family History   Problem Relation Age of Onset    Stroke Mother     Diabetes Mellitus Mother     Colon cancer Father     Diabetes Mellitus Father         Patient Care Team:  Ventura Parsons DO as PCP - General (Family Medicine)  Alexander Devlin MD as Consulting Physician (Cardiology)  Amy Bennett MD (Ophthalmology)  Cande Davenport MD as Consulting Physician (Infectious Diseases)  Mickey Kingsley Jr., MD as Consulting Physician (Pain Medicine)  Sarita Allen MA as Care Coordinator  Romel Erickson MD as Consulting Physician (Gastroenterology)  Noe Price MD as Consulting Physician (Oncology)     Subjective:     Review of Systems   Constitutional:  Negative for chills and fever.   HENT:  Positive for hearing loss.    Respiratory:  Negative for shortness of breath.    Cardiovascular:  Negative for chest pain.   Gastrointestinal:  Negative for constipation and diarrhea.   Neurological:  Negative for headaches.   Psychiatric/Behavioral:  The patient does not have insomnia.        See HPI for details  All Other ROS: Negative except as stated in HPI.       Objective:     /78   Pulse 65   Temp 97  "°F (36.1 °C) (Tympanic)   Resp 18   Ht 5' 7.72" (1.72 m)   Wt 112.9 kg (249 lb)   SpO2 98%   BMI 38.18 kg/m²     Physical Exam  Vitals reviewed.   Constitutional:       General: He is not in acute distress.     Appearance: Normal appearance. He is not ill-appearing.   HENT:      Right Ear: External ear normal. There is impacted cerumen. Tympanic membrane is perforated.      Left Ear: External ear normal. There is impacted cerumen. Tympanic membrane is perforated.   Cardiovascular:      Rate and Rhythm: Normal rate and regular rhythm.      Pulses: Normal pulses.      Heart sounds: Normal heart sounds. No murmur heard.     No friction rub. No gallop.   Pulmonary:      Effort: No respiratory distress.      Breath sounds: No wheezing, rhonchi or rales.   Musculoskeletal:         General: No swelling.      Right lower leg: No edema.      Left lower leg: No edema.   Skin:     General: Skin is warm and dry.   Neurological:      General: No focal deficit present.      Mental Status: He is alert.   Psychiatric:         Mood and Affect: Mood normal.         Behavior: Behavior normal.         Assessment/Plan:     1. Impacted cerumen of both ears  Verbal consent obtained. Risks of procedure discussed included pain, bleeding, damage to involved structures, and infection.   2. Flu vaccine need  -     Influenza (FLUAD) - Quadrivalent (Adjuvanted) *Preferred* (65+) (PF)    3. Perforation of both tympanic membranes   Will refer to ENT.     Unable to fully visualize TM initially due to impacted cerumen. Combination of water irrigation and lighted curette tip used to removed impacted cerumen from bilateral ears.  Patient tolerated procedure well with no pain while removing cerumen from bilateral ears. Further inspection of bilateral ears demonstrated what appeared to be perforated TM bilaterally.     Follow up:     Follow up if symptoms worsen or fail to improve. In addition to their scheduled follow up, the patient has also been " instructed to follow up on as needed basis.

## 2023-10-17 DIAGNOSIS — E11.9 TYPE 2 DIABETES MELLITUS WITHOUT COMPLICATION, WITHOUT LONG-TERM CURRENT USE OF INSULIN: ICD-10-CM

## 2023-10-17 RX ORDER — METFORMIN HYDROCHLORIDE 1000 MG/1
TABLET ORAL
Qty: 180 TABLET | Refills: 1 | Status: SHIPPED | OUTPATIENT
Start: 2023-10-17

## 2023-10-25 ENCOUNTER — LAB VISIT (OUTPATIENT)
Dept: LAB | Facility: HOSPITAL | Age: 63
End: 2023-10-25
Attending: INTERNAL MEDICINE
Payer: MEDICARE

## 2023-10-25 DIAGNOSIS — D64.9 ANEMIA, UNSPECIFIED TYPE: Primary | ICD-10-CM

## 2023-10-25 DIAGNOSIS — E11.9 TYPE 2 DIABETES MELLITUS WITHOUT COMPLICATION, WITHOUT LONG-TERM CURRENT USE OF INSULIN: ICD-10-CM

## 2023-10-25 DIAGNOSIS — I10 HYPERTENSION, UNSPECIFIED TYPE: ICD-10-CM

## 2023-10-25 DIAGNOSIS — E03.9 HYPOTHYROIDISM, UNSPECIFIED TYPE: ICD-10-CM

## 2023-10-25 DIAGNOSIS — E78.5 HYPERLIPIDEMIA, UNSPECIFIED HYPERLIPIDEMIA TYPE: ICD-10-CM

## 2023-10-25 DIAGNOSIS — D50.9 MICROCYTIC ANEMIA: ICD-10-CM

## 2023-10-25 LAB
ALBUMIN SERPL-MCNC: 3.9 G/DL (ref 3.4–4.8)
ALBUMIN/GLOB SERPL: 1.1 RATIO (ref 1.1–2)
ALP SERPL-CCNC: 146 UNIT/L (ref 40–150)
ALT SERPL-CCNC: 41 UNIT/L (ref 0–55)
AST SERPL-CCNC: 54 UNIT/L (ref 5–34)
BASOPHILS # BLD AUTO: 0.02 X10(3)/MCL
BASOPHILS NFR BLD AUTO: 0.5 %
BILIRUB SERPL-MCNC: 0.5 MG/DL
BUN SERPL-MCNC: 18 MG/DL (ref 8.4–25.7)
CALCIUM SERPL-MCNC: 9.9 MG/DL (ref 8.8–10)
CHLORIDE SERPL-SCNC: 101 MMOL/L (ref 98–107)
CHOLEST SERPL-MCNC: 130 MG/DL
CHOLEST/HDLC SERPL: 4 {RATIO} (ref 0–5)
CO2 SERPL-SCNC: 30 MMOL/L (ref 23–31)
CREAT SERPL-MCNC: 1.21 MG/DL (ref 0.73–1.18)
EOSINOPHIL # BLD AUTO: 0.29 X10(3)/MCL (ref 0–0.9)
EOSINOPHIL NFR BLD AUTO: 6.8 %
ERYTHROCYTE [DISTWIDTH] IN BLOOD BY AUTOMATED COUNT: 16.9 % (ref 11.5–17)
EST. AVERAGE GLUCOSE BLD GHB EST-MCNC: 154.2 MG/DL
FERRITIN SERPL-MCNC: 109.1 NG/ML (ref 21.81–274.66)
FOLATE SERPL-MCNC: 10.1 NG/ML (ref 7–31.4)
GFR SERPLBLD CREATININE-BSD FMLA CKD-EPI: >60 MLS/MIN/1.73/M2
GLOBULIN SER-MCNC: 3.5 GM/DL (ref 2.4–3.5)
GLUCOSE SERPL-MCNC: 134 MG/DL (ref 82–115)
HBA1C MFR BLD: 7 %
HCT VFR BLD AUTO: 40.6 % (ref 42–52)
HDLC SERPL-MCNC: 30 MG/DL (ref 35–60)
HGB BLD-MCNC: 12.8 G/DL (ref 14–18)
IMM GRANULOCYTES # BLD AUTO: 0 X10(3)/MCL (ref 0–0.04)
IMM GRANULOCYTES NFR BLD AUTO: 0 %
IRON SERPL-MCNC: 78 UG/DL (ref 65–175)
LDLC SERPL CALC-MCNC: 54 MG/DL (ref 50–140)
LYMPHOCYTES # BLD AUTO: 1.14 X10(3)/MCL (ref 0.6–4.6)
LYMPHOCYTES NFR BLD AUTO: 26.6 %
MCH RBC QN AUTO: 29.4 PG (ref 27–31)
MCHC RBC AUTO-ENTMCNC: 31.5 G/DL (ref 33–36)
MCV RBC AUTO: 93.1 FL (ref 80–94)
MONOCYTES # BLD AUTO: 0.45 X10(3)/MCL (ref 0.1–1.3)
MONOCYTES NFR BLD AUTO: 10.5 %
NEUTROPHILS # BLD AUTO: 2.39 X10(3)/MCL (ref 2.1–9.2)
NEUTROPHILS NFR BLD AUTO: 55.6 %
NRBC BLD AUTO-RTO: 0 %
PLATELET # BLD AUTO: 134 X10(3)/MCL (ref 130–400)
PMV BLD AUTO: 11.1 FL (ref 7.4–10.4)
POTASSIUM SERPL-SCNC: 4.3 MMOL/L (ref 3.5–5.1)
PROT SERPL-MCNC: 7.4 GM/DL (ref 5.8–7.6)
RBC # BLD AUTO: 4.36 X10(6)/MCL (ref 4.7–6.1)
SODIUM SERPL-SCNC: 142 MMOL/L (ref 136–145)
TRIGL SERPL-MCNC: 232 MG/DL (ref 34–140)
TSH SERPL-ACNC: 3.84 UIU/ML (ref 0.35–4.94)
VIT B12 SERPL-MCNC: <148 PG/ML (ref 213–816)
VLDLC SERPL CALC-MCNC: 46 MG/DL
WBC # SPEC AUTO: 4.29 X10(3)/MCL (ref 4.5–11.5)

## 2023-10-25 PROCEDURE — 83036 HEMOGLOBIN GLYCOSYLATED A1C: CPT

## 2023-10-25 PROCEDURE — 82607 VITAMIN B-12: CPT

## 2023-10-25 PROCEDURE — 85025 COMPLETE CBC W/AUTO DIFF WBC: CPT

## 2023-10-25 PROCEDURE — 84443 ASSAY THYROID STIM HORMONE: CPT

## 2023-10-25 PROCEDURE — 83540 ASSAY OF IRON: CPT

## 2023-10-25 PROCEDURE — 36415 COLL VENOUS BLD VENIPUNCTURE: CPT

## 2023-10-25 PROCEDURE — 80053 COMPREHEN METABOLIC PANEL: CPT

## 2023-10-25 PROCEDURE — 80061 LIPID PANEL: CPT

## 2023-10-25 PROCEDURE — 82728 ASSAY OF FERRITIN: CPT

## 2023-10-25 PROCEDURE — 82746 ASSAY OF FOLIC ACID SERUM: CPT

## 2023-10-29 DIAGNOSIS — E78.5 HYPERLIPIDEMIA, UNSPECIFIED HYPERLIPIDEMIA TYPE: ICD-10-CM

## 2023-10-30 RX ORDER — ATORVASTATIN CALCIUM 40 MG/1
TABLET, FILM COATED ORAL
Qty: 90 TABLET | Refills: 1 | Status: SHIPPED | OUTPATIENT
Start: 2023-10-30

## 2023-11-01 NOTE — PROGRESS NOTES
Subjective:       Patient ID: Aba Dunbar is a 63 y.o. male.    Chief Complaint:  Chronic back pain    Diagnosis:  Iron deficiency anemia  Vitamin B12 deficiency     Current Treatment:  Ferrous sulfate 325 mg b.i.d. (started 2/16/23)    Clinical History:  Patient was referred by his PCP for evaluation of anemia on routine laboratory.  CBC from 2/3/23 showed a hemoglobin of 8.8 and hematocrit 31.2 with an MCV of 79.8 and RDW 19.9.  WBC and platelet counts were normal.  Reticulocyte count was 1.6%.  Additional laboratory drawn 2/7/23 confirmed iron deficiency with a serum iron of 38, TIBC 348, saturation 10% and ferritin level 22 ng/mL.  Review of previous laboratory, shows longstanding anemia dating back at least 2 years.  He had symptoms of PICA for ice.  He denied any abdominal symptoms or complaints.  No weight loss.  No change in his bowel habits, no melena or hematochezia.  He had a screening colonoscopy approximately 6 months ago with small polyps removed.  He has never had an EGD.    He was seen as a new patient 2/15/23.  Treatment was recommended with ferrous sulfate 325 mg b.i.d. His anemia improved on treatment his PICA symptoms resolved.    EGD 5/18/23 showed mild gastritis and patchy erythema with ulceration about the lesser curvature of the stomach.  He was placed on Protonix.  Pathology showed no H pylori or evidence of malignancy.    Interval History  He returns to clinic today by himself for a three-month follow-up for iron deficiency anemia.  He remains on ferrous sulfate 325 mg b.i.d. He continues to tolerate treatment well. He denies any constipation or other GI side effects.  He denies any melena or hematochezia.  His hemoglobin and ferritin level show improvement.  He was recently seen in follow-up by GI.  A B12 level was performed and returned at <148. Patient will be starting Vitamin B12 injections next week.      Review of Systems   Constitutional:  Negative for appetite change, fatigue  "and fever.   HENT:  Negative for mouth sores, sore throat and trouble swallowing.    Eyes: Negative.    Respiratory:  Negative for cough and shortness of breath.    Cardiovascular:  Negative for chest pain, palpitations and leg swelling.   Gastrointestinal:  Negative for abdominal distention, abdominal pain, blood in stool, constipation, diarrhea and nausea.   Genitourinary:  Negative for dysuria, frequency and urgency.   Musculoskeletal:  Positive for arthralgias and back pain (Chronic, lower back).   Integumentary:  Negative for pallor and rash.   Neurological:  Negative for dizziness, weakness, numbness and headaches.   Hematological:  Negative for adenopathy. Does not bruise/bleed easily.   Psychiatric/Behavioral: Negative.           PMHx:  HTN, hyperlipidemia, CAD, DM, hypothyroid, arthritis, Charcot-Abbie-Tooth disease, GERD, RLS  PSHx:  Tonsils, bilateral ankle fusions, CABG  SH:  Former smoker 1 ppd, quit age 46.  Occasional alcohol use.  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  FH:  His father had colon cancer.    Objective:        BP (!) 143/89   Pulse 82   Temp 97.3 °F (36.3 °C)   Resp 18   Ht 5' 7" (1.702 m)   Wt 112.4 kg (247 lb 11.2 oz)   SpO2 97%   BMI 38.80 kg/m²    Physical Exam  Constitutional:       Comments: Morbidly obese white male in [facility], ambulatory with a cane   HENT:      Head: Normocephalic and atraumatic.      Mouth/Throat:      Mouth: Mucous membranes are moist.      Pharynx: Oropharynx is clear. No posterior oropharyngeal erythema.   Eyes:      Extraocular Movements: Extraocular movements intact.      Conjunctiva/sclera: Conjunctivae normal.      Pupils: Pupils are equal, round, and reactive to light.   Cardiovascular:      Rate and Rhythm: Normal rate and regular rhythm.      Heart sounds: No murmur heard.     Comments: Well-healed sternotomy incision  Pulmonary:      Effort: Pulmonary effort is normal.      Breath sounds: Normal breath sounds.   Abdominal:      " General: Bowel sounds are normal. There is no distension.      Palpations: Abdomen is soft. There is no mass.      Tenderness: There is no abdominal tenderness.      Comments: Obese.   Musculoskeletal:         General: No swelling or tenderness. Normal range of motion.      Cervical back: Neck supple. No tenderness.   Lymphadenopathy:      Cervical: No cervical adenopathy.   Skin:     General: Skin is warm and dry.      Findings: No rash.   Neurological:      General: No focal deficit present.      Mental Status: He is alert and oriented to person, place, and time.      Cranial Nerves: No cranial nerve deficit.      Motor: No weakness.            LABORATORY  Recent Results (from the past 168 hour(s))   Ferritin    Collection Time: 11/02/23  9:54 AM   Result Value Ref Range    Ferritin Level 84.14 21.81 - 274.66 ng/mL   CBC with Differential    Collection Time: 11/02/23  9:54 AM   Result Value Ref Range    WBC 5.45 4.50 - 11.50 x10(3)/mcL    RBC 4.46 (L) 4.70 - 6.10 x10(6)/mcL    Hgb 13.1 (L) 14.0 - 18.0 g/dL    Hct 43.2 42.0 - 52.0 %    MCV 96.9 (H) 80.0 - 94.0 fL    MCH 29.4 27.0 - 31.0 pg    MCHC 30.3 (L) 33.0 - 36.0 g/dL    RDW 16.5 11.5 - 17.0 %    Platelet 133 130 - 400 x10(3)/mcL    MPV 10.1 7.4 - 10.4 fL    Neut % 59.6 %    Lymph % 24.0 %    Mono % 10.5 %    Eos % 5.5 %    Basophil % 0.2 %    Lymph # 1.31 0.6 - 4.6 x10(3)/mcL    Neut # 3.25 2.1 - 9.2 x10(3)/mcL    Mono # 0.57 0.1 - 1.3 x10(3)/mcL    Eos # 0.30 0 - 0.9 x10(3)/mcL    Baso # 0.01 <=0.2 x10(3)/mcL    IG# 0.01 0 - 0.04 x10(3)/mcL    IG% 0.2 %          Assessment:   Iron deficiency anemia - Improved  Vitamin B12 deficiency     Plan:   EGD on  5/18/2023 showed mild gastritis and erythema with ulceration.  He was started on Protonix with improvement in his reflux symptoms.  Laboratory results reviewed and discussed with patient.  Iron deficiency anemia has resolved.  Continue FeSO4 b.i.d., tolerating well.    He will be starting Vitamin B12  injections next week in [place] with Dr. Erickson.  I offered to provide B12 replacement here at the cancer center, but he declined.  The travel would be too far for him.  Wellness visit with Dr. Garrido on 11/22/23  [facility] in 6 months for a follow-up visit with a CBC, B12 and ferritin levels.  All questions answered to the satisfaction of the patient.    RADHA PENALOZA, FNP-C  Can[facility] Center [facility]       Other Physicians  Dr. Ventura Erickson

## 2023-11-02 ENCOUNTER — PATIENT OUTREACH (OUTPATIENT)
Dept: ADMINISTRATIVE | Facility: HOSPITAL | Age: 63
End: 2023-11-02
Payer: MEDICARE

## 2023-11-02 ENCOUNTER — OFFICE VISIT (OUTPATIENT)
Dept: HEMATOLOGY/ONCOLOGY | Facility: CLINIC | Age: 63
End: 2023-11-02
Payer: MEDICARE

## 2023-11-02 VITALS
DIASTOLIC BLOOD PRESSURE: 89 MMHG | HEIGHT: 67 IN | TEMPERATURE: 97 F | WEIGHT: 247.69 LBS | BODY MASS INDEX: 38.88 KG/M2 | HEART RATE: 82 BPM | SYSTOLIC BLOOD PRESSURE: 143 MMHG | RESPIRATION RATE: 18 BRPM | OXYGEN SATURATION: 97 %

## 2023-11-02 DIAGNOSIS — D51.8 OTHER VITAMIN B12 DEFICIENCY ANEMIA: ICD-10-CM

## 2023-11-02 DIAGNOSIS — D50.9 IRON DEFICIENCY ANEMIA, UNSPECIFIED IRON DEFICIENCY ANEMIA TYPE: Primary | ICD-10-CM

## 2023-11-02 PROBLEM — E53.8 VITAMIN B12 DEFICIENCY: Status: ACTIVE | Noted: 2023-11-02

## 2023-11-02 PROCEDURE — 1160F PR REVIEW ALL MEDS BY PRESCRIBER/CLIN PHARMACIST DOCUMENTED: ICD-10-PCS | Mod: CPTII,S$GLB,, | Performed by: NURSE PRACTITIONER

## 2023-11-02 PROCEDURE — 1159F MED LIST DOCD IN RCRD: CPT | Mod: CPTII,S$GLB,, | Performed by: NURSE PRACTITIONER

## 2023-11-02 PROCEDURE — 3066F NEPHROPATHY DOC TX: CPT | Mod: CPTII,S$GLB,, | Performed by: NURSE PRACTITIONER

## 2023-11-02 PROCEDURE — 3079F DIAST BP 80-89 MM HG: CPT | Mod: CPTII,S$GLB,, | Performed by: NURSE PRACTITIONER

## 2023-11-02 PROCEDURE — 99999 PR PBB SHADOW E&M-EST. PATIENT-LVL III: ICD-10-PCS | Mod: PBBFAC,,, | Performed by: NURSE PRACTITIONER

## 2023-11-02 PROCEDURE — 1160F RVW MEDS BY RX/DR IN RCRD: CPT | Mod: CPTII,S$GLB,, | Performed by: NURSE PRACTITIONER

## 2023-11-02 PROCEDURE — 3061F NEG MICROALBUMINURIA REV: CPT | Mod: CPTII,S$GLB,, | Performed by: NURSE PRACTITIONER

## 2023-11-02 PROCEDURE — 99213 OFFICE O/P EST LOW 20 MIN: CPT | Mod: S$GLB,,, | Performed by: NURSE PRACTITIONER

## 2023-11-02 PROCEDURE — 3051F HG A1C>EQUAL 7.0%<8.0%: CPT | Mod: CPTII,S$GLB,, | Performed by: NURSE PRACTITIONER

## 2023-11-02 PROCEDURE — 3008F PR BODY MASS INDEX (BMI) DOCUMENTED: ICD-10-PCS | Mod: CPTII,S$GLB,, | Performed by: NURSE PRACTITIONER

## 2023-11-02 PROCEDURE — 3061F PR NEG MICROALBUMINURIA RESULT DOCUMENTED/REVIEW: ICD-10-PCS | Mod: CPTII,S$GLB,, | Performed by: NURSE PRACTITIONER

## 2023-11-02 PROCEDURE — 3077F SYST BP >= 140 MM HG: CPT | Mod: CPTII,S$GLB,, | Performed by: NURSE PRACTITIONER

## 2023-11-02 PROCEDURE — 99999 PR PBB SHADOW E&M-EST. PATIENT-LVL III: CPT | Mod: PBBFAC,,, | Performed by: NURSE PRACTITIONER

## 2023-11-02 PROCEDURE — 3008F BODY MASS INDEX DOCD: CPT | Mod: CPTII,S$GLB,, | Performed by: NURSE PRACTITIONER

## 2023-11-02 PROCEDURE — 99213 PR OFFICE/OUTPT VISIT, EST, LEVL III, 20-29 MIN: ICD-10-PCS | Mod: S$GLB,,, | Performed by: NURSE PRACTITIONER

## 2023-11-02 PROCEDURE — 4010F PR ACE/ARB THEARPY RXD/TAKEN: ICD-10-PCS | Mod: CPTII,S$GLB,, | Performed by: NURSE PRACTITIONER

## 2023-11-02 PROCEDURE — 3077F PR MOST RECENT SYSTOLIC BLOOD PRESSURE >= 140 MM HG: ICD-10-PCS | Mod: CPTII,S$GLB,, | Performed by: NURSE PRACTITIONER

## 2023-11-02 PROCEDURE — 1159F PR MEDICATION LIST DOCUMENTED IN MEDICAL RECORD: ICD-10-PCS | Mod: CPTII,S$GLB,, | Performed by: NURSE PRACTITIONER

## 2023-11-02 PROCEDURE — 3079F PR MOST RECENT DIASTOLIC BLOOD PRESSURE 80-89 MM HG: ICD-10-PCS | Mod: CPTII,S$GLB,, | Performed by: NURSE PRACTITIONER

## 2023-11-02 PROCEDURE — 3051F PR MOST RECENT HEMOGLOBIN A1C LEVEL 7.0 - < 8.0%: ICD-10-PCS | Mod: CPTII,S$GLB,, | Performed by: NURSE PRACTITIONER

## 2023-11-02 PROCEDURE — 4010F ACE/ARB THERAPY RXD/TAKEN: CPT | Mod: CPTII,S$GLB,, | Performed by: NURSE PRACTITIONER

## 2023-11-02 PROCEDURE — 3066F PR DOCUMENTATION OF TREATMENT FOR NEPHROPATHY: ICD-10-PCS | Mod: CPTII,S$GLB,, | Performed by: NURSE PRACTITIONER

## 2023-11-02 RX ORDER — CYANOCOBALAMIN 1000 UG/ML
INJECTION, SOLUTION INTRAMUSCULAR; SUBCUTANEOUS
COMMUNITY
Start: 2023-10-31

## 2023-11-02 NOTE — PROGRESS NOTES
Population Health. Out Reach. Reviewing patient's chart for quality metrics. Records request sent to Dr. Erickson office for colonoscopy.

## 2023-11-02 NOTE — LETTER
AUTHORIZATION FOR RELEASE OF   CONFIDENTIAL INFORMATION    Dear Dr. Erickson, Fax [phone]    We are seeing Aba Dunbar, date of birth 1960, in the clinic at [facility]. Ventura Parsons DO is the patient's PCP. Aba Dunbar has an outstanding lab/procedure at the time we reviewed his chart. In order to help keep his health information updated, he has authorized us to request the following medical record(s):        (  )  MAMMOGRAM                                      ( X )  COLONOSCOPY      (  )  PAP SMEAR                                          (  )  OUTSIDE LAB RESULTS     (  )  DEXA SCAN                                          (  )  EYE EXAM            (  )  FOOT EXAM                                          (  )  ENTIRE RECORD     (  )  OUTSIDE IMMUNIZATIONS                 (  )  _______________         Please fax records to [facility], [place], Quinn, DO, [phone] or [phone].       If you have any questions you can contact Sarita Allen at [phone].           Patient Name: Aba Dunbar  : 1960  Patient Phone #: [phone]

## 2023-11-07 ENCOUNTER — LAB VISIT (OUTPATIENT)
Dept: LAB | Facility: HOSPITAL | Age: 63
End: 2023-11-07
Attending: INTERNAL MEDICINE
Payer: MEDICARE

## 2023-11-07 DIAGNOSIS — M86.372 CHRONIC MULTIFOCAL OSTEOMYELITIS, LEFT ANKLE AND FOOT: Primary | ICD-10-CM

## 2023-11-07 LAB
ALBUMIN SERPL-MCNC: 3.8 G/DL (ref 3.4–4.8)
ALBUMIN/GLOB SERPL: 1.1 RATIO (ref 1.1–2)
ALP SERPL-CCNC: 144 UNIT/L (ref 40–150)
ALT SERPL-CCNC: 39 UNIT/L (ref 0–55)
AST SERPL-CCNC: 44 UNIT/L (ref 5–34)
BASOPHILS # BLD AUTO: 0.03 X10(3)/MCL
BASOPHILS NFR BLD AUTO: 0.6 %
BILIRUB SERPL-MCNC: 0.4 MG/DL
BUN SERPL-MCNC: 22 MG/DL (ref 8.4–25.7)
CALCIUM SERPL-MCNC: 10 MG/DL (ref 8.8–10)
CHLORIDE SERPL-SCNC: 102 MMOL/L (ref 98–107)
CO2 SERPL-SCNC: 28 MMOL/L (ref 23–31)
CREAT SERPL-MCNC: 1.35 MG/DL (ref 0.73–1.18)
CRP SERPL-MCNC: 8.1 MG/L
EOSINOPHIL # BLD AUTO: 0.3 X10(3)/MCL (ref 0–0.9)
EOSINOPHIL NFR BLD AUTO: 6.5 %
ERYTHROCYTE [DISTWIDTH] IN BLOOD BY AUTOMATED COUNT: 17 % (ref 11.5–17)
ERYTHROCYTE [SEDIMENTATION RATE] IN BLOOD: 21 MM/HR (ref 0–15)
GFR SERPLBLD CREATININE-BSD FMLA CKD-EPI: 59 MLS/MIN/1.73/M2
GLOBULIN SER-MCNC: 3.6 GM/DL (ref 2.4–3.5)
GLUCOSE SERPL-MCNC: 147 MG/DL (ref 82–115)
HCT VFR BLD AUTO: 39.4 % (ref 42–52)
HGB BLD-MCNC: 12.4 G/DL (ref 14–18)
IMM GRANULOCYTES # BLD AUTO: 0.02 X10(3)/MCL (ref 0–0.04)
IMM GRANULOCYTES NFR BLD AUTO: 0.4 %
LYMPHOCYTES # BLD AUTO: 1.04 X10(3)/MCL (ref 0.6–4.6)
LYMPHOCYTES NFR BLD AUTO: 22.4 %
MCH RBC QN AUTO: 29.3 PG (ref 27–31)
MCHC RBC AUTO-ENTMCNC: 31.5 G/DL (ref 33–36)
MCV RBC AUTO: 93.1 FL (ref 80–94)
MONOCYTES # BLD AUTO: 0.52 X10(3)/MCL (ref 0.1–1.3)
MONOCYTES NFR BLD AUTO: 11.2 %
NEUTROPHILS # BLD AUTO: 2.74 X10(3)/MCL (ref 2.1–9.2)
NEUTROPHILS NFR BLD AUTO: 58.9 %
NRBC BLD AUTO-RTO: 0 %
PLATELET # BLD AUTO: 121 X10(3)/MCL (ref 130–400)
PLATELET # BLD EST: ABNORMAL 10*3/UL
PMV BLD AUTO: 11 FL (ref 7.4–10.4)
POTASSIUM SERPL-SCNC: 5.1 MMOL/L (ref 3.5–5.1)
PROT SERPL-MCNC: 7.4 GM/DL (ref 5.8–7.6)
RBC # BLD AUTO: 4.23 X10(6)/MCL (ref 4.7–6.1)
SODIUM SERPL-SCNC: 140 MMOL/L (ref 136–145)
WBC # SPEC AUTO: 4.65 X10(3)/MCL (ref 4.5–11.5)

## 2023-11-07 PROCEDURE — 85652 RBC SED RATE AUTOMATED: CPT

## 2023-11-07 PROCEDURE — 80053 COMPREHEN METABOLIC PANEL: CPT

## 2023-11-07 PROCEDURE — 36415 COLL VENOUS BLD VENIPUNCTURE: CPT

## 2023-11-07 PROCEDURE — 86140 C-REACTIVE PROTEIN: CPT

## 2023-11-07 PROCEDURE — 85025 COMPLETE CBC W/AUTO DIFF WBC: CPT

## 2023-11-22 ENCOUNTER — OFFICE VISIT (OUTPATIENT)
Dept: FAMILY MEDICINE | Facility: CLINIC | Age: 63
End: 2023-11-22
Payer: MEDICARE

## 2023-11-22 VITALS
BODY MASS INDEX: 38.77 KG/M2 | SYSTOLIC BLOOD PRESSURE: 129 MMHG | HEART RATE: 87 BPM | TEMPERATURE: 98 F | RESPIRATION RATE: 18 BRPM | HEIGHT: 67 IN | OXYGEN SATURATION: 98 % | WEIGHT: 247 LBS | DIASTOLIC BLOOD PRESSURE: 79 MMHG

## 2023-11-22 DIAGNOSIS — G60.0 HEREDITARY MOTOR AND SENSORY NEUROPATHY: ICD-10-CM
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
## 2023-11-22 DIAGNOSIS — D69.6 THROMBOCYTOPENIA, UNSPECIFIED: ICD-10-CM

## 2023-11-22 DIAGNOSIS — D50.8 IRON DEFICIENCY ANEMIA SECONDARY TO INADEQUATE DIETARY IRON INTAKE: ICD-10-CM

## 2023-11-22 DIAGNOSIS — Z00.00 ROUTINE GENERAL MEDICAL EXAMINATION AT A HEALTH CARE FACILITY: Primary | ICD-10-CM

## 2023-11-22 DIAGNOSIS — I10 PRIMARY HYPERTENSION: Chronic | ICD-10-CM

## 2023-11-22 DIAGNOSIS — E11.9 TYPE 2 DIABETES MELLITUS WITHOUT COMPLICATION, WITHOUT LONG-TERM CURRENT USE OF INSULIN: ICD-10-CM

## 2023-11-22 DIAGNOSIS — G60.0 CHARCOT-MARIE-TOOTH DISEASE TYPE 2: ICD-10-CM

## 2023-11-22 PROCEDURE — 3066F PR DOCUMENTATION OF TREATMENT FOR NEPHROPATHY: ICD-10-PCS | Mod: CPTII,,, | Performed by: FAMILY MEDICINE

## 2023-11-22 PROCEDURE — 4010F ACE/ARB THERAPY RXD/TAKEN: CPT | Mod: CPTII,,, | Performed by: FAMILY MEDICINE

## 2023-11-22 PROCEDURE — 3051F PR MOST RECENT HEMOGLOBIN A1C LEVEL 7.0 - < 8.0%: ICD-10-PCS | Mod: CPTII,,, | Performed by: FAMILY MEDICINE

## 2023-11-22 PROCEDURE — 3074F SYST BP LT 130 MM HG: CPT | Mod: CPTII,,, | Performed by: FAMILY MEDICINE

## 2023-11-22 PROCEDURE — 1160F RVW MEDS BY RX/DR IN RCRD: CPT | Mod: CPTII,,, | Performed by: FAMILY MEDICINE

## 2023-11-22 PROCEDURE — 3061F PR NEG MICROALBUMINURIA RESULT DOCUMENTED/REVIEW: ICD-10-PCS | Mod: CPTII,,, | Performed by: FAMILY MEDICINE

## 2023-11-22 PROCEDURE — 2023F DILAT RTA XM W/O RTNOPTHY: CPT | Mod: CPTII,,, | Performed by: FAMILY MEDICINE

## 2023-11-22 PROCEDURE — 3078F DIAST BP <80 MM HG: CPT | Mod: CPTII,,, | Performed by: FAMILY MEDICINE

## 2023-11-22 PROCEDURE — 3066F NEPHROPATHY DOC TX: CPT | Mod: CPTII,,, | Performed by: FAMILY MEDICINE

## 2023-11-22 PROCEDURE — 3051F HG A1C>EQUAL 7.0%<8.0%: CPT | Mod: CPTII,,, | Performed by: FAMILY MEDICINE

## 2023-11-22 PROCEDURE — 4010F PR ACE/ARB THEARPY RXD/TAKEN: ICD-10-PCS | Mod: CPTII,,, | Performed by: FAMILY MEDICINE

## 2023-11-22 PROCEDURE — 1160F PR REVIEW ALL MEDS BY PRESCRIBER/CLIN PHARMACIST DOCUMENTED: ICD-10-PCS | Mod: CPTII,,, | Performed by: FAMILY MEDICINE

## 2023-11-22 PROCEDURE — 3061F NEG MICROALBUMINURIA REV: CPT | Mod: CPTII,,, | Performed by: FAMILY MEDICINE

## 2023-11-22 PROCEDURE — 1159F PR MEDICATION LIST DOCUMENTED IN MEDICAL RECORD: ICD-10-PCS | Mod: CPTII,,, | Performed by: FAMILY MEDICINE

## 2023-11-22 PROCEDURE — 2023F PR DILATED RETINAL EXAM W/O EVID OF RETINOPATHY: ICD-10-PCS | Mod: CPTII,,, | Performed by: FAMILY MEDICINE

## 2023-11-22 PROCEDURE — G0439 PR MEDICARE ANNUAL WELLNESS SUBSEQUENT VISIT: ICD-10-PCS | Mod: ,,, | Performed by: FAMILY MEDICINE

## 2023-11-22 PROCEDURE — 3074F PR MOST RECENT SYSTOLIC BLOOD PRESSURE < 130 MM HG: ICD-10-PCS | Mod: CPTII,,, | Performed by: FAMILY MEDICINE

## 2023-11-22 PROCEDURE — G0439 PPPS, SUBSEQ VISIT: HCPCS | Mod: ,,, | Performed by: FAMILY MEDICINE

## 2023-11-22 PROCEDURE — 3078F PR MOST RECENT DIASTOLIC BLOOD PRESSURE < 80 MM HG: ICD-10-PCS | Mod: CPTII,,, | Performed by: FAMILY MEDICINE

## 2023-11-22 PROCEDURE — 1159F MED LIST DOCD IN RCRD: CPT | Mod: CPTII,,, | Performed by: FAMILY MEDICINE

## 2023-11-22 NOTE — PROGRESS NOTES
Patient ID: [id number]     Chief Complaint: Medicare AWV and Pre-op Exam (Needs dental clearance for routine procedures/injections if needed with pt h/o ASA use)    HPI:     Aba Dunbar is a 63 y.o. male here today for a Medicare Wellness. Fell this morning on his porch, denies hitting his head, denies any pain. Was able to pick himself up.   ----------------------------  Anemia, unspecified  CAD (coronary artery disease)  Charcot-Abbie-Tooth disease type 2  Chronic osteomyelitis of left ankle  Diabetes mellitus, type 2  Family history of colon cancer in father  GERD (gastroesophageal reflux disease)  History of osteomyelitis  Hyperlipidemia  Hypertension  Hypothyroidism, unspecified  Muscular dystrophy  RITO (obstructive sleep apnea)  RITO on CPAP  Osteoarthritis of ankle and foot, right  Osteoarthritis of left knee  Restless legs syndrome (RLS)     Past Surgical History:   Procedure Laterality Date    ANKLE FUSION Right 09/21/2020    Dr Maryjane Chow    ANKLE FUSION Left 07/06/2020    Dr Maryjane Chow    ANKLE HARDWARE REMOVAL Left 09/21/2020    Dr Maryjane Chow    CARDIAC CATHETERIZATION Left 01/22/2019    Dr Alexander Garcia    CORONARY ANGIOPLASTY WITH STENT PLACEMENT Left 02/17/2016    Dr Clarence De La Rosa    CORONARY ARTERY BYPASS GRAFT  2006    x3    CORONARY STENT PLACEMENT Left 02/17/2016    Dr Clarence De La Rosa    INCISION AND DRAINAGE  10/18/2010    INCISION AND DRAINAGE OF LOWER EXTREMITY Left 07/24/2014    Dr Maryjane Chow    INCISION AND DRAINAGE, BONE ABSCESS OR OSTEOMYELITIS, FOOT Bilateral 2007    TONSILLECTOMY  1962       Review of patient's allergies indicates:   Allergen Reactions    Hydrocodone      Other reaction(s): hyperactive  can't sleep    Hydrocodone-acetaminophen        Outpatient Medications Marked as Taking for the 11/22/23 encounter (Office Visit) with Ventura Parsons, DO   Medication Sig Dispense Refill    aspirin (ECOTRIN) 81 MG EC tablet Take 81 mg by  mouth.      atorvastatin (LIPITOR) 40 MG tablet TAKE 1 TABLET BY MOUTH EVERY DAY 90 tablet 1    carvediloL (COREG) 12.5 MG tablet Take 12.5 mg by mouth 2 (two) times daily.      ciprofloxacin HCl (CIPRO) 500 MG tablet Take 500 mg by mouth 2 (two) times daily.      cyanocobalamin 1,000 mcg/mL injection INJECT 1 ML INTRAMUSCULARLY PER WEEK FOR 4 WEEKS, THEN INJECT 1 ML INTRAMUSCULARLY ONCE A MONTH      doxycycline (VIBRA-TABS) 100 MG tablet Take 100 mg by mouth 2 (two) times daily.      ferrous sulfate (FEOSOL) 325 mg (65 mg iron) Tab tablet Take 325 mg by mouth 2 (two) times daily.      furosemide (LASIX) 20 MG tablet TAKE 1 TABLET BY MOUTH EVERY DAY AS NEEDED 90 tablet 3    levothyroxine (SYNTHROID) 50 MCG tablet TAKE 1 TABLET BY MOUTH EVERY DAY 90 tablet 1    metFORMIN (GLUCOPHAGE) 1000 MG tablet TAKE 1 TABLET BY MOUTH TWICE A  tablet 1    pantoprazole (PROTONIX) 40 MG tablet Take 40 mg by mouth once daily.      ramipriL (ALTACE) 2.5 MG capsule Take 2.5 mg by mouth once daily.         Social History     Socioeconomic History    Marital status:    Tobacco Use    Smoking status: Former     Types: Cigarettes    Smokeless tobacco: Never   Substance and Sexual Activity    Alcohol use: Not Currently    Drug use: Never    Sexual activity: Not Currently     Social Determinants of Health     Financial Resource Strain: Low Risk  (5/10/2023)    Overall Financial Resource Strain (CARDIA)     Difficulty of Paying Living Expenses: Not hard at all   Food Insecurity: No Food Insecurity (5/10/2023)    Hunger Vital Sign     Worried About Running Out of Food in the Last Year: Never true     Ran Out of Food in the Last Year: Never true   Transportation Needs: No Transportation Needs (5/10/2023)    PRAPARE - Transportation     Lack of Transportation (Medical): No     Lack of Transportation (Non-Medical): No   Physical Activity: Inactive (5/10/2023)    Exercise Vital Sign     Days of Exercise per Week: 0 days     Minutes  of Exercise per Session: 0 min   Stress: No Stress Concern Present (5/10/2023)    [employer] - Occupational Stress Questionnaire     Feeling of Stress : Not at all   Social Connections: Moderately Integrated (5/10/2023)    Social Connection and Isolation Panel [NHANES]     Frequency of Communication with Friends and Family: More than three times a week     Frequency of Social Gatherings with Friends and Family: Twice a week     Attends [employer] Services: 1 to 4 times per year     Active Member of Clubs or Organizations: Yes     Attends Club or Organization Meetings: 1 to 4 times per year     Marital Status:    Housing Stability: Low Risk  (5/10/2023)    Housing Stability Vital Sign     Unable to Pay for Housing in the Last Year: No     Number of Places Lived in the Last Year: 1     Unstable Housing in the Last Year: No       Family History   Problem Relation Age of Onset    Stroke Mother     Diabetes Mellitus Mother     Colon cancer Father     Diabetes Mellitus Father       Patient Care Team:  Ventura Parsons DO as PCP - General (Family Medicine)  Alexander Devlin MD as Consulting Physician (Cardiology)  Amy Bennett MD (Ophthalmology)  Cande Davenport MD as Consulting Physician (Infectious Diseases)  Mickey Kingsley Jr., MD as Consulting Physician (Pain Medicine)  Sarita Allen MA as Care Coordinator  Romel Erickson MD as Consulting Physician (Gastroenterology)  Noe Price MD as Consulting Physician (Oncology)     Subjective:     Review of Systems   Constitutional:  Negative for chills and fever.   Respiratory:  Negative for shortness of breath.    Cardiovascular:  Negative for chest pain.   Gastrointestinal:  Negative for constipation and diarrhea.   Musculoskeletal:  Positive for falls.   Neurological:  Negative for dizziness and headaches.   Psychiatric/Behavioral:  The patient does not have insomnia.        Patient Reported Health Risk Assessments:    "  Objective:     /79   Pulse 87   Temp 97.7 °F (36.5 °C) (Temporal)   Resp 18   Ht 5' 6.93" (1.7 m)   Wt 112 kg (247 lb)   SpO2 98%   BMI 38.77 kg/m²     Physical Exam  Vitals reviewed.   Constitutional:       General: He is not in acute distress.     Appearance: Normal appearance. He is not ill-appearing.   Cardiovascular:      Rate and Rhythm: Normal rate and regular rhythm.      Pulses: Normal pulses.      Heart sounds: Normal heart sounds. No murmur heard.     No friction rub. No gallop.   Pulmonary:      Effort: No respiratory distress.      Breath sounds: No wheezing, rhonchi or rales.   Musculoskeletal:         General: No swelling.      Right lower leg: No edema.      Left lower leg: No edema.   Skin:     General: Skin is warm and dry.   Neurological:      General: No focal deficit present.      Mental Status: He is alert.   Psychiatric:         Mood and Affect: Mood normal.         Behavior: Behavior normal.       Assessment:       ICD-10-CM ICD-9-CM   1. Routine general medical examination at a health care facility  Z00.00 V70.0   2. Thrombocytopenia, unspecified  D69.6 287.5   3. Charcot-Abbie-Tooth disease type 2  G60.0 356.1   4. Hereditary motor and sensory neuropathy  G60.0 356.1   5. Type 2 diabetes mellitus without complication, without long-term current use of insulin  E11.9 250.00   6. Iron deficiency anemia secondary to inadequate dietary iron intake  D50.8 280.1   7. Primary hypertension  I10 401.9   8. Fall, initial encounter  W19.XXXA E888.9        Plan:     Medicare Annual Wellness and Personalized Prevention Plan:     Fall Risk + Home Safety + Living Situation + Whisper Test + Depression Screen + CAGE + Cognitive Impairment Screen + ADL Screen + Timed Get Up and Go + Nutrition Screen + PAQ Screen + Health Risk Assessment all reviewed.          No data to display                  11/22/2023     8:30 AM 11/2/2023    10:30 AM 6/29/2023    10:40 AM 5/10/2023     8:30 AM 3/29/2023    " 10:40 AM 2/7/2023     8:00 AM 11/17/2022    10:00 AM   Fall Risk Assessment - Outpatient   Mobility Status Ambulatory w/ assistance Ambulatory w/ assistance Ambulatory Ambulatory w/ assistance Ambulatory Ambulatory w/ assistance Ambulatory w/ assistance   Number of falls 0 0 0 0 0 0 0   Identified as fall risk True True False True False True True                             Alcohol/Tobacco Use - Stressed importance of smoking cessation and limiting alcohol intake.  CVD Risk Factors - Reviewed  Obesity/Physical Activity - Encouraged daily 30 minute physical activity x 5 days per week.    Opioid Screening: Patient medication list reviewed, patient is not taking prescription opioids. Patient is not using additional opioids than prescribed. Patient is at low risk of substance abuse based on this opioid use history.      Health Maintenance Due   Topic Date Due    Foot Exam  Never done    TETANUS VACCINE  Never done    Colorectal Cancer Screening  Never done    Shingles Vaccine (1 of 2) Never done    RSV Vaccine (Age 60+ and Pregnant patients) (1 - 1-dose 60+ series) Never done    COVID-19 Vaccine (4 - 2023-24 season) 09/01/2023     Prostate Cancer Screening -    Lab Results   Component Value Date    PSA 0.99 02/03/2023    PSA 1.08 10/26/2021    PSA 0.64 (L) 01/14/2020     Eye Exam - Recommend annually   Dental Exam - Recommend biannually  Vaccinations -   Immunization History   Administered Date(s) Administered    COVID-19, MRNA, LN-S, PF (MODERNA FULL 0.5 ML DOSE) 11/26/2021    COVID-19, MRNA, LN-S, PF (Pfizer) (Purple Cap) 02/27/2021, 03/20/2021    Influenza (FLUAD) - Quadrivalent - Adjuvanted - PF *Preferred* (65+) 10/13/2023    Influenza - Quadrivalent 11/05/2015    Influenza - Quadrivalent - PF *Preferred* (6 months and older) 11/05/2015, 10/21/2019, 12/10/2022    Pneumococcal Conjugate - 20 Valent 12/10/2022      Advance Care Planning     Date: 11/22/2023  ACP discussed with patient and form provided for patient to  complete and return to office.      1. Routine general medical examination at a health care facility    2. Thrombocytopenia, unspecified  Likely secondary to aspirin use. Also followed by Hematology  3. Charcot-Abbie-Tooth disease type 2  - Ambulatory referral/consult to Podiatry; Future    4. Hereditary motor and sensory neuropathy  - Ambulatory referral/consult to Podiatry; Future    5. Type 2 diabetes mellitus without complication, without long-term current use of insulin  Well controlled on metformin. Will check labs at follow up (A1C, CMP, urin microalbumin)  6. Iron deficiency anemia secondary to inadequate dietary iron intake  On oral iron, followed by hematology  7. Primary hypertension  On ramipril, furosemide, and coreg. Followed by Cardiology  8. Fall, initial encounter  -Advised patient to get a life-alert button. Notify clinic if he falls again or report to ER if severe fall or head trauma.     Medication List with Changes/Refills   Current Medications    ASPIRIN (ECOTRIN) 81 MG EC TABLET    Take 81 mg by mouth.       Start Date: --        End Date: --    ATORVASTATIN (LIPITOR) 40 MG TABLET    TAKE 1 TABLET BY MOUTH EVERY DAY       Start Date: 10/30/2023End Date: --    CARVEDILOL (COREG) 12.5 MG TABLET    Take 12.5 mg by mouth 2 (two) times daily.       Start Date: 11/3/2022 End Date: --    CIPROFLOXACIN HCL (CIPRO) 500 MG TABLET    Take 500 mg by mouth 2 (two) times daily.       Start Date: 10/30/2022End Date: --    CYANOCOBALAMIN 1,000 MCG/ML INJECTION    INJECT 1 ML INTRAMUSCULARLY PER WEEK FOR 4 WEEKS, THEN INJECT 1 ML INTRAMUSCULARLY ONCE A MONTH       Start Date: 10/31/2023End Date: --    DOXYCYCLINE (VIBRA-TABS) 100 MG TABLET    Take 100 mg by mouth 2 (two) times daily.       Start Date: 11/11/2022End Date: --    FERROUS SULFATE (FEOSOL) 325 MG (65 MG IRON) TAB TABLET    Take 325 mg by mouth 2 (two) times daily.       Start Date: --        End Date: --    FUROSEMIDE (LASIX) 20 MG TABLET    TAKE 1  TABLET BY MOUTH EVERY DAY AS NEEDED       Start Date: 9/21/2023 End Date: --    LEVOTHYROXINE (SYNTHROID) 50 MCG TABLET    TAKE 1 TABLET BY MOUTH EVERY DAY       Start Date: 6/9/2023  End Date: --    METFORMIN (GLUCOPHAGE) 1000 MG TABLET    TAKE 1 TABLET BY MOUTH TWICE A DAY       Start Date: 10/17/2023End Date: --    PANTOPRAZOLE (PROTONIX) 40 MG TABLET    Take 40 mg by mouth once daily.       Start Date: 8/7/2023  End Date: --    RAMIPRIL (ALTACE) 2.5 MG CAPSULE    Take 2.5 mg by mouth once daily.       Start Date: 11/3/2022 End Date: --      Follow up in about 6 months (around 5/22/2024) for Follow up diabetes with labs. In addition to their scheduled follow up, the patient has also been instructed to follow up on as needed basis.     Provided patient with a 5-10 year written screening schedule and personal prevention plan. Recommendations were developed using the USPSTF age appropriate recommendations. Education, counseling, and referrals were provided as needed. After Visit Summary printed and given to patient which includes a list of additional screenings\tests needed.

## 2023-12-08 DIAGNOSIS — E03.9 HYPOTHYROIDISM, UNSPECIFIED TYPE: ICD-10-CM

## 2023-12-08 RX ORDER — LEVOTHYROXINE SODIUM 50 UG/1
TABLET ORAL
Qty: 90 TABLET | Refills: 1 | Status: SHIPPED | OUTPATIENT
Start: 2023-12-08

## 2024-01-23 ENCOUNTER — DOCUMENTATION ONLY (OUTPATIENT)
Dept: FAMILY MEDICINE | Facility: CLINIC | Age: 64
End: 2024-01-23
Payer: MEDICARE

## 2024-01-23 LAB
LEFT EYE DM RETINOPATHY: POSITIVE
RIGHT EYE DM RETINOPATHY: POSITIVE

## 2024-02-02 ENCOUNTER — LAB VISIT (OUTPATIENT)
Dept: LAB | Facility: HOSPITAL | Age: 64
End: 2024-02-02
Attending: INTERNAL MEDICINE
Payer: MEDICARE

## 2024-02-02 DIAGNOSIS — M86.372 CHRONIC MULTIFOCAL OSTEOMYELITIS, LEFT ANKLE AND FOOT: Primary | ICD-10-CM

## 2024-02-02 LAB
ALBUMIN SERPL-MCNC: 3.9 G/DL (ref 3.4–4.8)
ALBUMIN/GLOB SERPL: 1 RATIO (ref 1.1–2)
ALP SERPL-CCNC: 145 UNIT/L (ref 40–150)
ALT SERPL-CCNC: 33 UNIT/L (ref 0–55)
AST SERPL-CCNC: 40 UNIT/L (ref 5–34)
BASOPHILS # BLD AUTO: 0.02 X10(3)/MCL
BASOPHILS NFR BLD AUTO: 0.4 %
BILIRUB SERPL-MCNC: 0.5 MG/DL
BUN SERPL-MCNC: 22 MG/DL (ref 8.4–25.7)
CALCIUM SERPL-MCNC: 10 MG/DL (ref 8.8–10)
CHLORIDE SERPL-SCNC: 102 MMOL/L (ref 98–107)
CO2 SERPL-SCNC: 27 MMOL/L (ref 23–31)
CREAT SERPL-MCNC: 1.34 MG/DL (ref 0.73–1.18)
CRP SERPL-MCNC: 3.7 MG/L
EOSINOPHIL # BLD AUTO: 0.28 X10(3)/MCL (ref 0–0.9)
EOSINOPHIL NFR BLD AUTO: 5.8 %
ERYTHROCYTE [DISTWIDTH] IN BLOOD BY AUTOMATED COUNT: 15 % (ref 11.5–17)
ERYTHROCYTE [SEDIMENTATION RATE] IN BLOOD: 18 MM/HR (ref 0–15)
GFR SERPLBLD CREATININE-BSD FMLA CKD-EPI: 59 MLS/MIN/1.73/M2
GLOBULIN SER-MCNC: 3.9 GM/DL (ref 2.4–3.5)
GLUCOSE SERPL-MCNC: 128 MG/DL (ref 82–115)
HCT VFR BLD AUTO: 41.3 % (ref 42–52)
HGB BLD-MCNC: 12.8 G/DL (ref 14–18)
IMM GRANULOCYTES # BLD AUTO: 0.02 X10(3)/MCL (ref 0–0.04)
IMM GRANULOCYTES NFR BLD AUTO: 0.4 %
LYMPHOCYTES # BLD AUTO: 1.12 X10(3)/MCL (ref 0.6–4.6)
LYMPHOCYTES NFR BLD AUTO: 23.3 %
MCH RBC QN AUTO: 28.8 PG (ref 27–31)
MCHC RBC AUTO-ENTMCNC: 31 G/DL (ref 33–36)
MCV RBC AUTO: 93 FL (ref 80–94)
MONOCYTES # BLD AUTO: 0.49 X10(3)/MCL (ref 0.1–1.3)
MONOCYTES NFR BLD AUTO: 10.2 %
NEUTROPHILS # BLD AUTO: 2.88 X10(3)/MCL (ref 2.1–9.2)
NEUTROPHILS NFR BLD AUTO: 59.9 %
NRBC BLD AUTO-RTO: 0 %
PLATELET # BLD AUTO: 116 X10(3)/MCL (ref 130–400)
PLATELET # BLD EST: ABNORMAL 10*3/UL
PMV BLD AUTO: 11.1 FL (ref 7.4–10.4)
POTASSIUM SERPL-SCNC: 4.7 MMOL/L (ref 3.5–5.1)
PROT SERPL-MCNC: 7.8 GM/DL (ref 5.8–7.6)
RBC # BLD AUTO: 4.44 X10(6)/MCL (ref 4.7–6.1)
SODIUM SERPL-SCNC: 141 MMOL/L (ref 136–145)
WBC # SPEC AUTO: 4.81 X10(3)/MCL (ref 4.5–11.5)

## 2024-02-02 PROCEDURE — 86140 C-REACTIVE PROTEIN: CPT

## 2024-02-02 PROCEDURE — 80053 COMPREHEN METABOLIC PANEL: CPT

## 2024-02-02 PROCEDURE — 85025 COMPLETE CBC W/AUTO DIFF WBC: CPT

## 2024-02-02 PROCEDURE — 36415 COLL VENOUS BLD VENIPUNCTURE: CPT

## 2024-02-02 PROCEDURE — 85652 RBC SED RATE AUTOMATED: CPT

## 2024-04-19 ENCOUNTER — LAB VISIT (OUTPATIENT)
Dept: LAB | Facility: HOSPITAL | Age: 64
End: 2024-04-19
Attending: NURSE PRACTITIONER
Payer: MEDICARE

## 2024-04-19 DIAGNOSIS — D51.8 OTHER VITAMIN B12 DEFICIENCY ANEMIA: ICD-10-CM

## 2024-04-19 DIAGNOSIS — D50.9 IRON DEFICIENCY ANEMIA, UNSPECIFIED IRON DEFICIENCY ANEMIA TYPE: ICD-10-CM

## 2024-04-19 LAB
BASOPHILS # BLD AUTO: 0.02 X10(3)/MCL
BASOPHILS NFR BLD AUTO: 0.3 %
EOSINOPHIL # BLD AUTO: 0.52 X10(3)/MCL (ref 0–0.9)
EOSINOPHIL NFR BLD AUTO: 8.5 %
ERYTHROCYTE [DISTWIDTH] IN BLOOD BY AUTOMATED COUNT: 15.3 % (ref 11.5–17)
FERRITIN SERPL-MCNC: 124.83 NG/ML (ref 21.81–274.66)
HCT VFR BLD AUTO: 38.8 % (ref 42–52)
HGB BLD-MCNC: 12.4 G/DL (ref 14–18)
IMM GRANULOCYTES # BLD AUTO: 0.01 X10(3)/MCL (ref 0–0.04)
IMM GRANULOCYTES NFR BLD AUTO: 0.2 %
LYMPHOCYTES # BLD AUTO: 1.27 X10(3)/MCL (ref 0.6–4.6)
LYMPHOCYTES NFR BLD AUTO: 20.7 %
MCH RBC QN AUTO: 28.8 PG (ref 27–31)
MCHC RBC AUTO-ENTMCNC: 32 G/DL (ref 33–36)
MCV RBC AUTO: 90 FL (ref 80–94)
MONOCYTES # BLD AUTO: 0.66 X10(3)/MCL (ref 0.1–1.3)
MONOCYTES NFR BLD AUTO: 10.8 %
NEUTROPHILS # BLD AUTO: 3.65 X10(3)/MCL (ref 2.1–9.2)
NEUTROPHILS NFR BLD AUTO: 59.5 %
PLATELET # BLD AUTO: 116 X10(3)/MCL (ref 130–400)
PMV BLD AUTO: 10.6 FL (ref 7.4–10.4)
RBC # BLD AUTO: 4.31 X10(6)/MCL (ref 4.7–6.1)
VIT B12 SERPL-MCNC: >2000 PG/ML (ref 213–816)
WBC # SPEC AUTO: 6.13 X10(3)/MCL (ref 4.5–11.5)

## 2024-04-19 PROCEDURE — 85025 COMPLETE CBC W/AUTO DIFF WBC: CPT

## 2024-04-19 PROCEDURE — 36415 COLL VENOUS BLD VENIPUNCTURE: CPT

## 2024-04-19 PROCEDURE — 82607 VITAMIN B-12: CPT

## 2024-04-19 PROCEDURE — 82728 ASSAY OF FERRITIN: CPT

## 2024-05-03 ENCOUNTER — LAB VISIT (OUTPATIENT)
Dept: LAB | Facility: HOSPITAL | Age: 64
End: 2024-05-03
Attending: INTERNAL MEDICINE
Payer: MEDICARE

## 2024-05-03 ENCOUNTER — OFFICE VISIT (OUTPATIENT)
Dept: HEMATOLOGY/ONCOLOGY | Facility: CLINIC | Age: 64
End: 2024-05-03
Payer: MEDICARE

## 2024-05-03 VITALS
HEART RATE: 79 BPM | RESPIRATION RATE: 15 BRPM | WEIGHT: 236.63 LBS | DIASTOLIC BLOOD PRESSURE: 73 MMHG | SYSTOLIC BLOOD PRESSURE: 128 MMHG | BODY MASS INDEX: 38.03 KG/M2 | HEIGHT: 66 IN | OXYGEN SATURATION: 98 % | TEMPERATURE: 99 F

## 2024-05-03 DIAGNOSIS — D50.9 IRON DEFICIENCY ANEMIA, UNSPECIFIED IRON DEFICIENCY ANEMIA TYPE: Primary | ICD-10-CM

## 2024-05-03 DIAGNOSIS — D51.8 OTHER VITAMIN B12 DEFICIENCY ANEMIA: ICD-10-CM

## 2024-05-03 DIAGNOSIS — E11.9 TYPE 2 DIABETES MELLITUS WITHOUT COMPLICATION, WITHOUT LONG-TERM CURRENT USE OF INSULIN: ICD-10-CM

## 2024-05-03 DIAGNOSIS — M86.372 CHRONIC MULTIFOCAL OSTEOMYELITIS, LEFT ANKLE AND FOOT: Primary | ICD-10-CM

## 2024-05-03 DIAGNOSIS — K21.00 GASTROESOPHAGEAL REFLUX DISEASE WITH ESOPHAGITIS WITHOUT HEMORRHAGE: Primary | ICD-10-CM

## 2024-05-03 LAB
ALBUMIN SERPL-MCNC: 3.9 G/DL (ref 3.4–4.8)
ALBUMIN/GLOB SERPL: 1 RATIO (ref 1.1–2)
ALP SERPL-CCNC: 159 UNIT/L (ref 40–150)
ALT SERPL-CCNC: 36 UNIT/L (ref 0–55)
AST SERPL-CCNC: 45 UNIT/L (ref 5–34)
BASOPHILS # BLD AUTO: 0.04 X10(3)/MCL
BASOPHILS NFR BLD AUTO: 0.8 %
BILIRUB SERPL-MCNC: 0.5 MG/DL
BUN SERPL-MCNC: 23 MG/DL (ref 8.4–25.7)
CALCIUM SERPL-MCNC: 10 MG/DL (ref 8.8–10)
CHLORIDE SERPL-SCNC: 102 MMOL/L (ref 98–107)
CHOLEST SERPL-MCNC: 131 MG/DL
CHOLEST/HDLC SERPL: 4 {RATIO} (ref 0–5)
CO2 SERPL-SCNC: 28 MMOL/L (ref 23–31)
CREAT SERPL-MCNC: 1.35 MG/DL (ref 0.73–1.18)
CREAT UR-MCNC: 73.4 MG/DL (ref 63–166)
CRP SERPL-MCNC: 8.2 MG/L
EOSINOPHIL # BLD AUTO: 0.4 X10(3)/MCL (ref 0–0.9)
EOSINOPHIL NFR BLD AUTO: 8.1 %
ERYTHROCYTE [DISTWIDTH] IN BLOOD BY AUTOMATED COUNT: 16 % (ref 11.5–17)
ERYTHROCYTE [SEDIMENTATION RATE] IN BLOOD: 61 MM/HR (ref 0–15)
EST. AVERAGE GLUCOSE BLD GHB EST-MCNC: 139.9 MG/DL
GFR SERPLBLD CREATININE-BSD FMLA CKD-EPI: 59 MLS/MIN/1.73/M2
GLOBULIN SER-MCNC: 3.9 GM/DL (ref 2.4–3.5)
GLUCOSE SERPL-MCNC: 127 MG/DL (ref 82–115)
HBA1C MFR BLD: 6.5 %
HCT VFR BLD AUTO: 40.3 % (ref 42–52)
HDLC SERPL-MCNC: 32 MG/DL (ref 35–60)
HGB BLD-MCNC: 12.8 G/DL (ref 14–18)
IMM GRANULOCYTES # BLD AUTO: 0.02 X10(3)/MCL (ref 0–0.04)
IMM GRANULOCYTES NFR BLD AUTO: 0.4 %
LDLC SERPL CALC-MCNC: 70 MG/DL (ref 50–140)
LYMPHOCYTES # BLD AUTO: 1.11 X10(3)/MCL (ref 0.6–4.6)
LYMPHOCYTES NFR BLD AUTO: 22.6 %
MCH RBC QN AUTO: 28.4 PG (ref 27–31)
MCHC RBC AUTO-ENTMCNC: 31.8 G/DL (ref 33–36)
MCV RBC AUTO: 89.6 FL (ref 80–94)
MICROALBUMIN UR-MCNC: 18.1 UG/ML
MICROALBUMIN/CREAT RATIO PNL UR: 24.7 MG/GM CR (ref 0–30)
MONOCYTES # BLD AUTO: 0.44 X10(3)/MCL (ref 0.1–1.3)
MONOCYTES NFR BLD AUTO: 9 %
NEUTROPHILS # BLD AUTO: 2.9 X10(3)/MCL (ref 2.1–9.2)
NEUTROPHILS NFR BLD AUTO: 59.1 %
NRBC BLD AUTO-RTO: 0 %
PLATELET # BLD AUTO: 125 X10(3)/MCL (ref 130–400)
PLATELET # BLD EST: ABNORMAL 10*3/UL
PMV BLD AUTO: 11.5 FL (ref 7.4–10.4)
POTASSIUM SERPL-SCNC: 4.7 MMOL/L (ref 3.5–5.1)
PROT SERPL-MCNC: 7.8 GM/DL (ref 5.8–7.6)
RBC # BLD AUTO: 4.5 X10(6)/MCL (ref 4.7–6.1)
SODIUM SERPL-SCNC: 142 MMOL/L (ref 136–145)
TRIGL SERPL-MCNC: 146 MG/DL (ref 34–140)
VLDLC SERPL CALC-MCNC: 29 MG/DL
WBC # SPEC AUTO: 4.91 X10(3)/MCL (ref 4.5–11.5)

## 2024-05-03 PROCEDURE — 1159F MED LIST DOCD IN RCRD: CPT | Mod: CPTII,S$GLB,, | Performed by: NURSE PRACTITIONER

## 2024-05-03 PROCEDURE — 80053 COMPREHEN METABOLIC PANEL: CPT

## 2024-05-03 PROCEDURE — 86140 C-REACTIVE PROTEIN: CPT

## 2024-05-03 PROCEDURE — 4010F ACE/ARB THERAPY RXD/TAKEN: CPT | Mod: CPTII,S$GLB,, | Performed by: NURSE PRACTITIONER

## 2024-05-03 PROCEDURE — 99999 PR PBB SHADOW E&M-EST. PATIENT-LVL IV: CPT | Mod: PBBFAC,,, | Performed by: NURSE PRACTITIONER

## 2024-05-03 PROCEDURE — 3078F DIAST BP <80 MM HG: CPT | Mod: CPTII,S$GLB,, | Performed by: NURSE PRACTITIONER

## 2024-05-03 PROCEDURE — 99213 OFFICE O/P EST LOW 20 MIN: CPT | Mod: S$GLB,,, | Performed by: NURSE PRACTITIONER

## 2024-05-03 PROCEDURE — 36415 COLL VENOUS BLD VENIPUNCTURE: CPT

## 2024-05-03 PROCEDURE — 82043 UR ALBUMIN QUANTITATIVE: CPT

## 2024-05-03 PROCEDURE — 3008F BODY MASS INDEX DOCD: CPT | Mod: CPTII,S$GLB,, | Performed by: NURSE PRACTITIONER

## 2024-05-03 PROCEDURE — 3044F HG A1C LEVEL LT 7.0%: CPT | Mod: CPTII,S$GLB,, | Performed by: NURSE PRACTITIONER

## 2024-05-03 PROCEDURE — 1160F RVW MEDS BY RX/DR IN RCRD: CPT | Mod: CPTII,S$GLB,, | Performed by: NURSE PRACTITIONER

## 2024-05-03 PROCEDURE — 80061 LIPID PANEL: CPT

## 2024-05-03 PROCEDURE — 85025 COMPLETE CBC W/AUTO DIFF WBC: CPT

## 2024-05-03 PROCEDURE — 85652 RBC SED RATE AUTOMATED: CPT

## 2024-05-03 PROCEDURE — 83036 HEMOGLOBIN GLYCOSYLATED A1C: CPT

## 2024-05-03 PROCEDURE — 3074F SYST BP LT 130 MM HG: CPT | Mod: CPTII,S$GLB,, | Performed by: NURSE PRACTITIONER

## 2024-05-03 RX ORDER — PANTOPRAZOLE SODIUM 40 MG/1
40 TABLET, DELAYED RELEASE ORAL
Qty: 90 TABLET | Refills: 3 | Status: SHIPPED | OUTPATIENT
Start: 2024-05-03

## 2024-05-03 RX ORDER — OMEPRAZOLE 20 MG/1
40 CAPSULE, DELAYED RELEASE ORAL EVERY MORNING
COMMUNITY

## 2024-05-03 NOTE — PROGRESS NOTES
Subjective:       Patient ID: Aba Dunbar is a 63 y.o. male.    Chief Complaint:  No new problems    Diagnosis:  Iron deficiency anemia  Vitamin B12 deficiency     Current Treatment:  Ferrous sulfate 325 mg b.i.d. (started 2/16/23); Monthly B12     Clinical History:  Patient was referred by his PCP for evaluation of anemia on routine laboratory.  CBC from 2/3/23 showed a hemoglobin of 8.8 and hematocrit 31.2 with an MCV of 79.8 and RDW 19.9.  WBC and platelet counts were normal.  Reticulocyte count was 1.6%.  Additional laboratory drawn 2/7/23 confirmed iron deficiency with a serum iron of 38, TIBC 348, saturation 10% and ferritin level 22 ng/mL.  Review of previous laboratory, shows longstanding anemia dating back at least 2 years.  He had symptoms of PICA for ice.  He denied any abdominal symptoms or complaints.  No weight loss.  No change in his bowel habits, no melena or hematochezia.  He had a screening colonoscopy approximately 6 months ago with small polyps removed.  He has never had an EGD.    He was seen as a new patient 2/15/23.  Treatment was recommended with ferrous sulfate 325 mg b.i.d. His anemia improved on treatment his PICA symptoms resolved.    EGD 5/18/23 showed mild gastritis and patchy erythema with ulceration about the lesser curvature of the stomach.  He was placed on Protonix.  Pathology showed no H pylori or evidence of malignancy.    Interval History  Mr. Dunbar returns to clinic today by himself for a six-month hematology follow-up visit.  He is ambulatory with a walker.  He feels well overall.  She is taking ferrous sulfate 325 mg b.i.d. with good tolerance.  He is also self administering B12 monthly.  Laboratory for this visit shows stable mild anemia and normal ferritin and B12 levels on supplementation/replacement.  Results reviewed and discussed today with the patient.    Review of Systems   Constitutional:  Negative for appetite change, fatigue and fever.   HENT:  Negative for  "mouth sores, sore throat and trouble swallowing.    Eyes: Negative.    Respiratory:  Negative for cough and shortness of breath.    Cardiovascular:  Negative for chest pain, palpitations and leg swelling.   Gastrointestinal:  Negative for abdominal distention, abdominal pain, blood in stool, constipation, diarrhea and nausea.   Genitourinary:  Negative for dysuria, frequency and urgency.   Musculoskeletal:  Positive for arthralgias and back pain (Chronic, lower back).   Integumentary:  Negative for pallor and rash.   Neurological:  Negative for dizziness, weakness, numbness and headaches.   Hematological:  Negative for adenopathy. Does not bruise/bleed easily.   Psychiatric/Behavioral: Negative.           PMHx:  HTN, hyperlipidemia, CAD, DM, hypothyroid, arthritis, Charcot-Abbie-Tooth disease, GERD, RLS  PSHx:  Tonsils, bilateral ankle fusions, CABG  SH:  Former smoker 1 ppd, quit age 46.  Occasional alcohol use.  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  FH:  His father had colon cancer.    Objective:        /73   Pulse 79   Temp 98.6 °F (37 °C)   Resp 15   Ht 5' 6" (1.676 m)   Wt 107.3 kg (236 lb 9.6 oz)   SpO2 98%   BMI 38.19 kg/m²    Physical Exam  Constitutional:       Comments: Morbidly obese white male in [facility], ambulatory with a rolling walker   HENT:      Head: Normocephalic and atraumatic.      Mouth/Throat:      Mouth: Mucous membranes are moist.      Pharynx: Oropharynx is clear. No posterior oropharyngeal erythema.   Eyes:      Extraocular Movements: Extraocular movements intact.      Conjunctiva/sclera: Conjunctivae normal.      Pupils: Pupils are equal, round, and reactive to light.   Cardiovascular:      Rate and Rhythm: Normal rate and regular rhythm.      Heart sounds: No murmur heard.     Comments: Well-healed sternotomy incision  Pulmonary:      Effort: Pulmonary effort is normal.      Breath sounds: Normal breath sounds.   Abdominal:      General: Bowel sounds are normal. " There is no distension.      Palpations: Abdomen is soft. There is no mass.      Tenderness: There is no abdominal tenderness.      Comments: Obese.   Musculoskeletal:         General: No swelling or tenderness. Normal range of motion.      Cervical back: Neck supple. No tenderness.   Lymphadenopathy:      Cervical: No cervical adenopathy.   Skin:     General: Skin is warm and dry.      Findings: No rash.   Neurological:      General: No focal deficit present.      Mental Status: He is alert and oriented to person, place, and time.      Cranial Nerves: No cranial nerve deficit.      Motor: No weakness.            LABORATORY  Recent Results (from the past 168 hour(s))   Hemoglobin A1C    Collection Time: 05/03/24  8:22 AM   Result Value Ref Range    Hemoglobin A1c 6.5 <=7.0 %    Estimated Average Glucose 139.9 mg/dL   Comprehensive Metabolic Panel    Collection Time: 05/03/24  8:22 AM   Result Value Ref Range    Sodium Level 142 136 - 145 mmol/L    Potassium Level 4.7 3.5 - 5.1 mmol/L    Chloride 102 98 - 107 mmol/L    Carbon Dioxide 28 23 - 31 mmol/L    Glucose Level 127 (H) 82 - 115 mg/dL    Blood Urea Nitrogen 23.0 8.4 - 25.7 mg/dL    Creatinine 1.35 (H) 0.73 - 1.18 mg/dL    Calcium Level Total 10.0 8.8 - 10.0 mg/dL    Protein Total 7.8 (H) 5.8 - 7.6 gm/dL    Albumin Level 3.9 3.4 - 4.8 g/dL    Globulin 3.9 (H) 2.4 - 3.5 gm/dL    Albumin/Globulin Ratio 1.0 (L) 1.1 - 2.0 ratio    Bilirubin Total 0.5 <=1.5 mg/dL    Alkaline Phosphatase 159 (H) 40 - 150 unit/L    Alanine Aminotransferase 36 0 - 55 unit/L    Aspartate Aminotransferase 45 (H) 5 - 34 unit/L    eGFR 59 mls/min/1.73/m2   Lipid Panel    Collection Time: 05/03/24  8:22 AM   Result Value Ref Range    Cholesterol Total 131 <=200 mg/dL    HDL Cholesterol 32 (L) 35 - 60 mg/dL    Triglyceride 146 (H) 34 - 140 mg/dL    Cholesterol/HDL Ratio 4 0 - 5    Very Low Density Lipoprotein 29     LDL Cholesterol 70.00 50.00 - 140.00 mg/dL   Sedimentation rate    Collection  Time: 05/03/24  8:22 AM   Result Value Ref Range    Sed Rate 61 (H) 0 - 15 mm/hr   CBC with Differential    Collection Time: 05/03/24  8:22 AM   Result Value Ref Range    WBC 4.91 4.50 - 11.50 x10(3)/mcL    RBC 4.50 (L) 4.70 - 6.10 x10(6)/mcL    Hgb 12.8 (L) 14.0 - 18.0 g/dL    Hct 40.3 (L) 42.0 - 52.0 %    MCV 89.6 80.0 - 94.0 fL    MCH 28.4 27.0 - 31.0 pg    MCHC 31.8 (L) 33.0 - 36.0 g/dL    RDW 16.0 11.5 - 17.0 %    Platelet 125 (L) 130 - 400 x10(3)/mcL    MPV 11.5 (H) 7.4 - 10.4 fL    Neut % 59.1 %    Lymph % 22.6 %    Mono % 9.0 %    Eos % 8.1 %    Basophil % 0.8 %    Lymph # 1.11 0.6 - 4.6 x10(3)/mcL    Neut # 2.90 2.1 - 9.2 x10(3)/mcL    Mono # 0.44 0.1 - 1.3 x10(3)/mcL    Eos # 0.40 0 - 0.9 x10(3)/mcL    Baso # 0.04 <=0.2 x10(3)/mcL    IG# 0.02 0 - 0.04 x10(3)/mcL    IG% 0.4 %    NRBC% 0.0 %   Blood Smear Microscopic Exam    Collection Time: 05/03/24  8:22 AM   Result Value Ref Range    Platelets Decreased (A) Normal, Adequate       Assessment:   Iron deficiency anemia - Improved  Vitamin B12 deficiency     Plan:   Patient has stable mild anemia with normal B12 and iron stores.  He is tolerating replacement therapy well.  Continue FeSO4 b.i.d. and monthly B12 injections.    RTC in 6 months for a follow-up visit with a CBC, B12 and ferritin levels.  He would like to have labs drawn in [place] because it is closer to his home.  Orders in place.  All questions answered to the satisfaction of the patient.    RADHA PENALOZA, FNP-C  [facility]       Other Physicians  Dr. Ventura Erickson

## 2024-05-04 DIAGNOSIS — E11.9 TYPE 2 DIABETES MELLITUS WITHOUT COMPLICATION, WITHOUT LONG-TERM CURRENT USE OF INSULIN: ICD-10-CM

## 2024-05-06 RX ORDER — METFORMIN HYDROCHLORIDE 1000 MG/1
TABLET ORAL
Qty: 180 TABLET | Refills: 1 | Status: SHIPPED | OUTPATIENT
Start: 2024-05-06 | End: 2024-05-22 | Stop reason: SDUPTHER

## 2024-05-22 ENCOUNTER — OFFICE VISIT (OUTPATIENT)
Dept: FAMILY MEDICINE | Facility: CLINIC | Age: 64
End: 2024-05-22
Payer: MEDICARE

## 2024-05-22 ENCOUNTER — PATIENT OUTREACH (OUTPATIENT)
Dept: ADMINISTRATIVE | Facility: HOSPITAL | Age: 64
End: 2024-05-22
Payer: MEDICARE

## 2024-05-22 VITALS
BODY MASS INDEX: 37.61 KG/M2 | OXYGEN SATURATION: 97 % | DIASTOLIC BLOOD PRESSURE: 70 MMHG | SYSTOLIC BLOOD PRESSURE: 109 MMHG | WEIGHT: 234 LBS | RESPIRATION RATE: 16 BRPM | HEART RATE: 76 BPM | HEIGHT: 66 IN | TEMPERATURE: 98 F

## 2024-05-22 DIAGNOSIS — E11.9 TYPE 2 DIABETES MELLITUS WITHOUT COMPLICATION, WITHOUT LONG-TERM CURRENT USE OF INSULIN: ICD-10-CM

## 2024-05-22 DIAGNOSIS — D69.6 THROMBOCYTOPENIA, UNSPECIFIED: ICD-10-CM

## 2024-05-22 DIAGNOSIS — N18.31 CHRONIC KIDNEY DISEASE, STAGE 3A: ICD-10-CM

## 2024-05-22 DIAGNOSIS — M54.50 CHRONIC BILATERAL LOW BACK PAIN, UNSPECIFIED WHETHER SCIATICA PRESENT: Primary | ICD-10-CM

## 2024-05-22 DIAGNOSIS — E66.01 CLASS 2 SEVERE OBESITY DUE TO EXCESS CALORIES WITH SERIOUS COMORBIDITY AND BODY MASS INDEX (BMI) OF 39.0 TO 39.9 IN ADULT: ICD-10-CM

## 2024-05-22 DIAGNOSIS — G89.29 CHRONIC BILATERAL LOW BACK PAIN, UNSPECIFIED WHETHER SCIATICA PRESENT: Primary | ICD-10-CM

## 2024-05-22 DIAGNOSIS — M86.679 CHRONIC OSTEOMYELITIS INVOLVING ANKLE AND FOOT, UNSPECIFIED LATERALITY: ICD-10-CM

## 2024-05-22 PROBLEM — M86.179: Status: RESOLVED | Noted: 2023-02-07 | Resolved: 2024-05-22

## 2024-05-22 PROCEDURE — 3066F NEPHROPATHY DOC TX: CPT | Mod: CPTII,,, | Performed by: FAMILY MEDICINE

## 2024-05-22 PROCEDURE — 4010F ACE/ARB THERAPY RXD/TAKEN: CPT | Mod: CPTII,,, | Performed by: FAMILY MEDICINE

## 2024-05-22 PROCEDURE — 3008F BODY MASS INDEX DOCD: CPT | Mod: CPTII,,, | Performed by: FAMILY MEDICINE

## 2024-05-22 PROCEDURE — 3061F NEG MICROALBUMINURIA REV: CPT | Mod: CPTII,,, | Performed by: FAMILY MEDICINE

## 2024-05-22 PROCEDURE — 1160F RVW MEDS BY RX/DR IN RCRD: CPT | Mod: CPTII,,, | Performed by: FAMILY MEDICINE

## 2024-05-22 PROCEDURE — 1159F MED LIST DOCD IN RCRD: CPT | Mod: CPTII,,, | Performed by: FAMILY MEDICINE

## 2024-05-22 PROCEDURE — 3078F DIAST BP <80 MM HG: CPT | Mod: CPTII,,, | Performed by: FAMILY MEDICINE

## 2024-05-22 PROCEDURE — 3074F SYST BP LT 130 MM HG: CPT | Mod: CPTII,,, | Performed by: FAMILY MEDICINE

## 2024-05-22 PROCEDURE — 3044F HG A1C LEVEL LT 7.0%: CPT | Mod: CPTII,,, | Performed by: FAMILY MEDICINE

## 2024-05-22 PROCEDURE — 99214 OFFICE O/P EST MOD 30 MIN: CPT | Mod: ,,, | Performed by: FAMILY MEDICINE

## 2024-05-22 RX ORDER — DEXTROMETHORPHAN HYDROBROMIDE, GUAIFENESIN 5; 100 MG/5ML; MG/5ML
1300 LIQUID ORAL DAILY PRN
COMMUNITY

## 2024-05-22 RX ORDER — METFORMIN HYDROCHLORIDE 1000 MG/1
1000 TABLET ORAL 2 TIMES DAILY
Qty: 180 TABLET | Refills: 1 | Status: SHIPPED | OUTPATIENT
Start: 2024-05-22

## 2024-05-22 RX ORDER — FLUTICASONE PROPIONATE 50 MCG
2 SPRAY, SUSPENSION (ML) NASAL DAILY
COMMUNITY
Start: 2024-04-15

## 2024-05-22 NOTE — LETTER
AUTHORIZATION FOR RELEASE OF   CONFIDENTIAL INFORMATION    Dear Mildred Sheldon, Fax [phone]    We are seeing Aba Dunbar, date of birth 1960, in the clinic at [facility]. Ventura Parsons DO is the patient's PCP. Aba Dunbar has an outstanding lab/procedure at the time we reviewed his chart. In order to help keep his health information updated, he has authorized us to request the following medical record(s):        (  )  MAMMOGRAM                                      ( X )  COLONOSCOPY      (  )  PAP SMEAR                                          (  )  OUTSIDE LAB RESULTS     (  )  DEXA SCAN                                          (  )  EYE EXAM            (  )  FOOT EXAM                                          (  )  ENTIRE RECORD     (  )  OUTSIDE IMMUNIZATIONS                 (  )  _______________         Please fax records to [facility], [place], Quinn, DO, [phone] or [phone].     If you have any questions you can contact Sarita Allen at [phone].           Patient Name: Aba Dunbar  : 1960  Patient Phone #: [phone]

## 2024-05-22 NOTE — PROGRESS NOTES
Patient ID: [id number]     Chief Complaint: Follow-up and Chronic Pain (Due to insurance changes pt needs another referral for pain management with Dr Mickey Kingsley in [place])    HPI:     Aba Dunbar is a 63 y.o. male here today for Follow-up and Chronic Pain (Due to insurance changes pt needs another referral for pain management with Dr Mickey Kingsley in [place]). Reviewed labs with patient today.  A1C of 6.5% on 5/3/24.     -------------------------------------    Anemia, unspecified    CAD (coronary artery disease)    Charcot-Abbie-Tooth disease type 2    Chronic osteomyelitis of left ankle    Diabetes mellitus, type 2    Diabetic retinopathy    Family history of colon cancer in father    GERD (gastroesophageal reflux disease)    History of osteomyelitis    Hyperlipidemia    Hypertension    Hypothyroidism, unspecified    Muscular dystrophy    RITO (obstructive sleep apnea)    RITO on CPAP    Osteoarthritis of ankle and foot, right    Osteoarthritis of left knee    Restless legs syndrome (RLS)        Past Surgical History:   Procedure Laterality Date    ANKLE FUSION Right 09/21/2020    Dr Maryjane Chow    ANKLE FUSION Left 07/06/2020    Dr Maryjane Chow    ANKLE HARDWARE REMOVAL Left 09/21/2020    Dr Maryjane Chow    CARDIAC CATHETERIZATION Left 01/22/2019    Dr Alexander Garcia    CORONARY ANGIOPLASTY WITH STENT PLACEMENT Left 02/17/2016    Dr Clarence De La Rosa    CORONARY ARTERY BYPASS GRAFT  2006    x3    CORONARY STENT PLACEMENT Left 02/17/2016    Dr Clarence De La Rosa    INCISION AND DRAINAGE  10/18/2010    INCISION AND DRAINAGE OF LOWER EXTREMITY Left 07/24/2014    Dr Maryjane Chow    INCISION AND DRAINAGE, BONE ABSCESS OR OSTEOMYELITIS, FOOT Bilateral 2007    TONSILLECTOMY  1962       Review of patient's allergies indicates:   Allergen Reactions    Hydrocodone      Other reaction(s): hyperactive  can't sleep    Hydrocodone-acetaminophen        Outpatient Medications Marked as  Taking for the 5/22/24 encounter (Office Visit) with Ventura Parsons, DO   Medication Sig Dispense Refill    acetaminophen (TYLENOL ARTHRITIS PAIN) 650 MG TbSR Take 1,300 mg by mouth daily as needed (Pain).      aspirin (ECOTRIN) 81 MG EC tablet Take 81 mg by mouth once daily.      atorvastatin (LIPITOR) 40 MG tablet TAKE 1 TABLET BY MOUTH EVERY DAY 90 tablet 1    carvediloL (COREG) 12.5 MG tablet Take 12.5 mg by mouth 2 (two) times daily.      ciprofloxacin HCl (CIPRO) 500 MG tablet Take 500 mg by mouth 2 (two) times daily.      cyanocobalamin 1,000 mcg/mL injection INJECT 1 ML INTRAMUSCULARLY PER WEEK FOR 4 WEEKS, THEN INJECT 1 ML INTRAMUSCULARLY ONCE A MONTH      doxycycline (VIBRA-TABS) 100 MG tablet Take 100 mg by mouth 2 (two) times daily.      ferrous sulfate (FEOSOL) 325 mg (65 mg iron) Tab tablet Take 325 mg by mouth 2 (two) times daily.      fluticasone propionate (FLONASE) 50 mcg/actuation nasal spray 2 sprays by Each Nostril route Daily.      furosemide (LASIX) 20 MG tablet TAKE 1 TABLET BY MOUTH EVERY DAY AS NEEDED 90 tablet 3    levothyroxine (SYNTHROID) 50 MCG tablet TAKE 1 TABLET BY MOUTH EVERY DAY 90 tablet 1    omeprazole (PRILOSEC) 20 MG capsule Take 40 mg by mouth every morning.      pantoprazole (PROTONIX) 40 MG tablet TAKE 1 TABLET BY MOUTH EVERY DAY 90 tablet 3    ramipriL (ALTACE) 2.5 MG capsule Take 2.5 mg by mouth once daily.      [DISCONTINUED] metFORMIN (GLUCOPHAGE) 1000 MG tablet TAKE 1 TABLET BY MOUTH TWICE A  tablet 1       Social History     Socioeconomic History    Marital status:    Tobacco Use    Smoking status: Former     Types: Cigarettes    Smokeless tobacco: Never   Substance and Sexual Activity    Alcohol use: Not Currently    Drug use: Never    Sexual activity: Not Currently     Social Determinants of Health     Financial Resource Strain: Low Risk  (5/10/2023)    Overall Financial Resource Strain (CARDIA)     Difficulty of Paying Living Expenses: Not hard at  all   Food Insecurity: No Food Insecurity (5/10/2023)    Hunger Vital Sign     Worried About Running Out of Food in the Last Year: Never true     Ran Out of Food in the Last Year: Never true   Transportation Needs: No Transportation Needs (5/10/2023)    PRAPARE - Transportation     Lack of Transportation (Medical): No     Lack of Transportation (Non-Medical): No   Physical Activity: Inactive (5/10/2023)    Exercise Vital Sign     Days of Exercise per Week: 0 days     Minutes of Exercise per Session: 0 min   Stress: No Stress Concern Present (5/10/2023)    [employer] [place] [employer] - Occupational Stress Questionnaire     Feeling of Stress : Not at all   Housing Stability: Low Risk  (5/10/2023)    Housing Stability Vital Sign     Unable to Pay for Housing in the Last Year: No     Number of Places Lived in the Last Year: 1     Unstable Housing in the Last Year: No        Family History   Problem Relation Name Age of Onset    Stroke Mother      Diabetes Mellitus Mother      Colon cancer Father      Diabetes Mellitus Father          Patient Care Team:  Ventura Parsons DO as PCP - General (Family Medicine)  Alexander Devlin MD as Consulting Physician (Cardiology)  Greg Bennett MD (Ophthalmology)  Cande Davenport MD as Consulting Physician (Infectious Diseases)  Mcikey Kingsley Jr., MD as Consulting Physician (Pain Medicine)  Sarita Allen MA as Care Coordinator  Romel Erickson MD as Consulting Physician (Gastroenterology)  Noe Price MD as Consulting Physician (Oncology)  Jeniffer Espitia DPM as Consulting Physician (Podiatry)     Subjective:     Review of Systems   Constitutional:  Negative for chills and fever.   Respiratory:  Negative for shortness of breath.    Cardiovascular:  Negative for chest pain.   Gastrointestinal:  Negative for constipation and diarrhea.   Neurological:  Negative for dizziness and headaches.   Psychiatric/Behavioral:  The patient does not have  "insomnia.        See HPI for details  All Other ROS: Negative except as stated in HPI.       Objective:     /70   Pulse 76   Temp 98.2 °F (36.8 °C) (Temporal)   Resp 16   Ht 5' 5.75" (1.67 m)   Wt 106.1 kg (234 lb)   SpO2 97%   BMI 38.06 kg/m²     Physical Exam  Vitals reviewed.   Constitutional:       General: He is not in acute distress.     Appearance: Normal appearance. He is not ill-appearing.   Cardiovascular:      Rate and Rhythm: Normal rate and regular rhythm.      Pulses: Normal pulses.      Heart sounds: Normal heart sounds. No murmur heard.     No friction rub. No gallop.   Pulmonary:      Effort: No respiratory distress.      Breath sounds: No wheezing, rhonchi or rales.   Musculoskeletal:         General: No swelling.      Right lower leg: No edema.      Left lower leg: No edema.   Skin:     General: Skin is warm and dry.   Neurological:      General: No focal deficit present.      Mental Status: He is alert.   Psychiatric:         Mood and Affect: Mood normal.         Behavior: Behavior normal.         Assessment/Plan:     1. Chronic bilateral low back pain, unspecified whether sciatica present  -     Ambulatory referral/consult to Pain Clinic; Future; Expected date: 05/22/2024    2. Chronic kidney disease, stage 3a  Stable. Limit nephrotoxic substances such as NSAIDS.   3. Type 2 diabetes mellitus without complication, without long-term current use of insulin  -     metFORMIN (GLUCOPHAGE) 1000 MG tablet; Take 1 tablet (1,000 mg total) by mouth 2 (two) times daily.  Dispense: 180 tablet; Refill: 1  Continue metformin as prescribed.   4. Chronic osteomyelitis involving ankle and foot, unspecified laterality  -Followed by Infectious disease. On ciprofloxacin 500mg BID and doxy 100mg BID  5. Thrombocytopenia, unspecified  Possibly secondary to chronic infection of bone.   6. Class 2 severe obesity due to excess calories with serious comorbidity and body mass index (BMI) of 39.0 to 39.9 in " adult  -diet and exercise as tolerated. Limited mobility by chronic osteomyelitis of ankle and foot.         Follow up:     Follow up in about 6 months (around 11/22/2024) for Medicare Wellness. In addition to their scheduled follow up, the patient has also been instructed to follow up on as needed basis.

## 2024-05-22 NOTE — PROGRESS NOTES
Health Maintenance Topic(s) Outreach Outcomes & Actions Taken:    Colorectal Cancer Screening - Outreach Outcomes & Actions Taken  : External Records Requested & Care Team Updated if Applicable and BEVERLY sent to [facility]     Additional Notes:

## 2024-06-03 ENCOUNTER — LAB VISIT (OUTPATIENT)
Dept: LAB | Facility: HOSPITAL | Age: 64
End: 2024-06-03
Attending: PAIN MEDICINE
Payer: MEDICARE

## 2024-06-03 DIAGNOSIS — R53.83 FATIGUE, UNSPECIFIED TYPE: ICD-10-CM

## 2024-06-03 DIAGNOSIS — E16.2 HYPOGLYCEMIA, UNSPECIFIED: ICD-10-CM

## 2024-06-03 DIAGNOSIS — G60.9 IDIOPATHIC PERIPHERAL NEUROPATHY: Primary | ICD-10-CM

## 2024-06-03 DIAGNOSIS — Z79.899 ENCOUNTER FOR LONG-TERM (CURRENT) USE OF OTHER MEDICATIONS: ICD-10-CM

## 2024-06-03 DIAGNOSIS — E55.9 VITAMIN D DEFICIENCY: ICD-10-CM

## 2024-06-03 LAB
25(OH)D3+25(OH)D2 SERPL-MCNC: 25 NG/ML (ref 30–80)
ALBUMIN SERPL-MCNC: 3.6 G/DL (ref 3.4–4.8)
ALBUMIN/GLOB SERPL: 1.1 RATIO (ref 1.1–2)
ALP SERPL-CCNC: 117 UNIT/L (ref 40–150)
ALT SERPL-CCNC: 24 UNIT/L (ref 0–55)
ANION GAP SERPL CALC-SCNC: 11 MEQ/L
AST SERPL-CCNC: 33 UNIT/L (ref 5–34)
BASOPHILS # BLD AUTO: 0.04 X10(3)/MCL
BASOPHILS NFR BLD AUTO: 0.7 %
BILIRUB SERPL-MCNC: 0.5 MG/DL
BUN SERPL-MCNC: 20 MG/DL (ref 8.4–25.7)
CALCIUM SERPL-MCNC: 10.1 MG/DL (ref 8.8–10)
CHLORIDE SERPL-SCNC: 103 MMOL/L (ref 98–107)
CK SERPL-CCNC: 96 U/L (ref 30–200)
CO2 SERPL-SCNC: 26 MMOL/L (ref 23–31)
CREAT SERPL-MCNC: 1.17 MG/DL (ref 0.73–1.18)
CREAT/UREA NIT SERPL: 17
CRP SERPL-MCNC: 4.6 MG/L
EOSINOPHIL # BLD AUTO: 0.9 X10(3)/MCL (ref 0–0.9)
EOSINOPHIL NFR BLD AUTO: 15.4 %
ERYTHROCYTE [DISTWIDTH] IN BLOOD BY AUTOMATED COUNT: 16.3 % (ref 11.5–17)
ERYTHROCYTE [SEDIMENTATION RATE] IN BLOOD: 25 MM/HR (ref 0–15)
EST. AVERAGE GLUCOSE BLD GHB EST-MCNC: 137 MG/DL
FOLATE SERPL-MCNC: 5.6 NG/ML (ref 7–31.4)
GFR SERPLBLD CREATININE-BSD FMLA CKD-EPI: >60 ML/MIN/1.73/M2
GLOBULIN SER-MCNC: 3.4 GM/DL (ref 2.4–3.5)
GLUCOSE SERPL-MCNC: 113 MG/DL (ref 82–115)
HBA1C MFR BLD: 6.4 %
HCT VFR BLD AUTO: 36.2 % (ref 42–52)
HGB BLD-MCNC: 11.6 G/DL (ref 14–18)
IMM GRANULOCYTES # BLD AUTO: 0.02 X10(3)/MCL (ref 0–0.04)
IMM GRANULOCYTES NFR BLD AUTO: 0.3 %
LYMPHOCYTES # BLD AUTO: 1.05 X10(3)/MCL (ref 0.6–4.6)
LYMPHOCYTES NFR BLD AUTO: 18 %
MCH RBC QN AUTO: 28.8 PG (ref 27–31)
MCHC RBC AUTO-ENTMCNC: 32 G/DL (ref 33–36)
MCV RBC AUTO: 89.8 FL (ref 80–94)
MONOCYTES # BLD AUTO: 0.44 X10(3)/MCL (ref 0.1–1.3)
MONOCYTES NFR BLD AUTO: 7.5 %
NEUTROPHILS # BLD AUTO: 3.38 X10(3)/MCL (ref 2.1–9.2)
NEUTROPHILS NFR BLD AUTO: 58.1 %
NRBC BLD AUTO-RTO: 0 %
PLATELET # BLD AUTO: 102 X10(3)/MCL (ref 130–400)
PLATELET # BLD EST: ABNORMAL 10*3/UL
PMV BLD AUTO: 10.9 FL (ref 7.4–10.4)
POTASSIUM SERPL-SCNC: 4.5 MMOL/L (ref 3.5–5.1)
PROT SERPL-MCNC: 7 GM/DL (ref 5.8–7.6)
RBC # BLD AUTO: 4.03 X10(6)/MCL (ref 4.7–6.1)
RHEUMATOID FACT SERPL-ACNC: <13 IU/ML
SODIUM SERPL-SCNC: 140 MMOL/L (ref 136–145)
T4 FREE SERPL-MCNC: 1 NG/DL (ref 0.7–1.48)
TSH SERPL-ACNC: 3.49 UIU/ML (ref 0.35–4.94)
VIT B12 SERPL-MCNC: 1023 PG/ML (ref 213–816)
WBC # SPEC AUTO: 5.83 X10(3)/MCL (ref 4.5–11.5)

## 2024-06-03 PROCEDURE — 82550 ASSAY OF CK (CPK): CPT

## 2024-06-03 PROCEDURE — 86039 ANTINUCLEAR ANTIBODIES (ANA): CPT

## 2024-06-03 PROCEDURE — 84207 ASSAY OF VITAMIN B-6: CPT

## 2024-06-03 PROCEDURE — 84443 ASSAY THYROID STIM HORMONE: CPT

## 2024-06-03 PROCEDURE — 83036 HEMOGLOBIN GLYCOSYLATED A1C: CPT

## 2024-06-03 PROCEDURE — 85025 COMPLETE CBC W/AUTO DIFF WBC: CPT

## 2024-06-03 PROCEDURE — 82607 VITAMIN B-12: CPT

## 2024-06-03 PROCEDURE — 82306 VITAMIN D 25 HYDROXY: CPT

## 2024-06-03 PROCEDURE — 80053 COMPREHEN METABOLIC PANEL: CPT

## 2024-06-03 PROCEDURE — 84165 PROTEIN E-PHORESIS SERUM: CPT

## 2024-06-03 PROCEDURE — 82746 ASSAY OF FOLIC ACID SERUM: CPT

## 2024-06-03 PROCEDURE — 36415 COLL VENOUS BLD VENIPUNCTURE: CPT

## 2024-06-03 PROCEDURE — 85652 RBC SED RATE AUTOMATED: CPT

## 2024-06-03 PROCEDURE — 86140 C-REACTIVE PROTEIN: CPT

## 2024-06-03 PROCEDURE — 86431 RHEUMATOID FACTOR QUANT: CPT

## 2024-06-03 PROCEDURE — 84439 ASSAY OF FREE THYROXINE: CPT

## 2024-06-04 LAB
ALBUMIN % SPEP (OHS): 47.62 (ref 48.1–59.5)
ALBUMIN SERPL-MCNC: 3.2 G/DL (ref 3.4–4.8)
ALBUMIN/GLOB SERPL: 0.9 RATIO (ref 1.1–2)
ALPHA 1 GLOB (OHS): 0.29 GM/DL (ref 0–0.4)
ALPHA 1 GLOB% (OHS): 4.32 (ref 2.3–4.9)
ALPHA 2 GLOB % (OHS): 15.74 (ref 6.9–13)
ALPHA 2 GLOB (OHS): 1.05 GM/DL (ref 0.4–1)
ANA PAT SER IF-IMP: ABNORMAL
ANA SER QL HEP2 SUBST: ABNORMAL
ANA TITR SER HEP2 SUBST: ABNORMAL {TITER}
BETA GLOB (OHS): 1.02 GM/DL (ref 0.7–1.3)
BETA GLOB% (OHS): 15.17 (ref 13.8–19.7)
GAMMA GLOBULIN % (OHS): 17.15 (ref 10.1–21.9)
GAMMA GLOBULIN (OHS): 1.15 GM/DL (ref 0.4–1.8)
GLOBULIN SER-MCNC: 3.5 GM/DL (ref 2.4–3.5)
M SPIKE % (OHS): ABNORMAL
M SPIKE (OHS): ABNORMAL
PATH REV: NORMAL
PROT SERPL-MCNC: 6.7 GM/DL (ref 5.8–7.6)

## 2024-06-08 LAB — PYRIDOXAL PHOS SERPL-MCNC: 4 MCG/L (ref 5–50)

## 2024-06-10 DIAGNOSIS — E03.9 HYPOTHYROIDISM, UNSPECIFIED TYPE: ICD-10-CM

## 2024-06-10 RX ORDER — LEVOTHYROXINE SODIUM 50 UG/1
TABLET ORAL
Qty: 90 TABLET | Refills: 1 | Status: SHIPPED | OUTPATIENT
Start: 2024-06-10

## 2024-06-21 DIAGNOSIS — E78.5 HYPERLIPIDEMIA, UNSPECIFIED HYPERLIPIDEMIA TYPE: ICD-10-CM

## 2024-06-21 RX ORDER — ATORVASTATIN CALCIUM 40 MG/1
TABLET, FILM COATED ORAL
Qty: 90 TABLET | Refills: 1 | Status: SHIPPED | OUTPATIENT
Start: 2024-06-21

## 2024-08-02 ENCOUNTER — LAB VISIT (OUTPATIENT)
Dept: LAB | Facility: HOSPITAL | Age: 64
End: 2024-08-02
Attending: INTERNAL MEDICINE
Payer: MEDICARE

## 2024-08-02 DIAGNOSIS — M86.372 CHRONIC MULTIFOCAL OSTEOMYELITIS, LEFT ANKLE AND FOOT: Primary | ICD-10-CM

## 2024-08-02 LAB
ALBUMIN SERPL-MCNC: 3.9 G/DL (ref 3.4–4.8)
ALBUMIN/GLOB SERPL: 1.1 RATIO (ref 1.1–2)
ALP SERPL-CCNC: 117 UNIT/L (ref 40–150)
ALT SERPL-CCNC: 26 UNIT/L (ref 0–55)
ANION GAP SERPL CALC-SCNC: 15 MEQ/L
AST SERPL-CCNC: 36 UNIT/L (ref 5–34)
BILIRUB SERPL-MCNC: 0.4 MG/DL
BUN SERPL-MCNC: 26 MG/DL (ref 8.4–25.7)
CALCIUM SERPL-MCNC: 10.3 MG/DL (ref 8.8–10)
CHLORIDE SERPL-SCNC: 106 MMOL/L (ref 98–107)
CO2 SERPL-SCNC: 24 MMOL/L (ref 23–31)
CREAT SERPL-MCNC: 1.59 MG/DL (ref 0.73–1.18)
CREAT/UREA NIT SERPL: 16
CRP SERPL-MCNC: 3.3 MG/L
ERYTHROCYTE [DISTWIDTH] IN BLOOD BY AUTOMATED COUNT: 15.9 % (ref 11.5–17)
ERYTHROCYTE [SEDIMENTATION RATE] IN BLOOD: 21 MM/HR (ref 0–15)
GFR SERPLBLD CREATININE-BSD FMLA CKD-EPI: 48 ML/MIN/1.73/M2
GLOBULIN SER-MCNC: 3.7 GM/DL (ref 2.4–3.5)
GLUCOSE SERPL-MCNC: 172 MG/DL (ref 82–115)
HCT VFR BLD AUTO: 40.1 % (ref 42–52)
HGB BLD-MCNC: 12.2 G/DL (ref 14–18)
MCH RBC QN AUTO: 28.2 PG (ref 27–31)
MCHC RBC AUTO-ENTMCNC: 30.4 G/DL (ref 33–36)
MCV RBC AUTO: 92.6 FL (ref 80–94)
NRBC BLD AUTO-RTO: 0 %
PLATELET # BLD AUTO: 115 X10(3)/MCL (ref 130–400)
PLATELET # BLD EST: ABNORMAL 10*3/UL
PMV BLD AUTO: 10.8 FL (ref 7.4–10.4)
POTASSIUM SERPL-SCNC: 4.7 MMOL/L (ref 3.5–5.1)
PROT SERPL-MCNC: 7.6 GM/DL (ref 5.8–7.6)
RBC # BLD AUTO: 4.33 X10(6)/MCL (ref 4.7–6.1)
SODIUM SERPL-SCNC: 145 MMOL/L (ref 136–145)
WBC # BLD AUTO: 6.05 X10(3)/MCL (ref 4.5–11.5)

## 2024-08-02 PROCEDURE — 85025 COMPLETE CBC W/AUTO DIFF WBC: CPT

## 2024-08-02 PROCEDURE — 80053 COMPREHEN METABOLIC PANEL: CPT

## 2024-08-02 PROCEDURE — 36415 COLL VENOUS BLD VENIPUNCTURE: CPT

## 2024-08-02 PROCEDURE — 85652 RBC SED RATE AUTOMATED: CPT

## 2024-08-02 PROCEDURE — 86140 C-REACTIVE PROTEIN: CPT

## 2024-08-09 ENCOUNTER — LAB VISIT (OUTPATIENT)
Dept: LAB | Facility: HOSPITAL | Age: 64
End: 2024-08-09
Attending: PAIN MEDICINE
Payer: MEDICARE

## 2024-08-09 DIAGNOSIS — Z79.899 ENCOUNTER FOR LONG-TERM (CURRENT) USE OF OTHER MEDICATIONS: ICD-10-CM

## 2024-08-09 DIAGNOSIS — G60.9 IDIOPATHIC PERIPHERAL NEUROPATHY: Primary | ICD-10-CM

## 2024-08-09 DIAGNOSIS — E55.9 AVITAMINOSIS D: ICD-10-CM

## 2024-08-09 DIAGNOSIS — E16.2 HYPOGLYCEMIA, UNSPECIFIED: ICD-10-CM

## 2024-08-09 DIAGNOSIS — R53.83 FATIGUE, UNSPECIFIED TYPE: ICD-10-CM

## 2024-08-09 PROBLEM — D51.9 ANEMIA DUE TO VITAMIN B12 DEFICIENCY: Status: ACTIVE | Noted: 2023-02-07

## 2024-08-09 LAB
ACANTHOCYTES (OLG): SLIGHT
ALBUMIN SERPL-MCNC: 3.9 G/DL (ref 3.4–4.8)
ALBUMIN/GLOB SERPL: 1.1 RATIO (ref 1.1–2)
ALP SERPL-CCNC: 105 UNIT/L (ref 40–150)
ALT SERPL-CCNC: 19 UNIT/L (ref 0–55)
ANION GAP SERPL CALC-SCNC: 12 MEQ/L
AST SERPL-CCNC: 31 UNIT/L (ref 5–34)
BASOPHILS # BLD AUTO: 0.04 X10(3)/MCL
BASOPHILS NFR BLD AUTO: 0.8 %
BILIRUB SERPL-MCNC: 0.4 MG/DL
BUN SERPL-MCNC: 26 MG/DL (ref 8.4–25.7)
CALCIUM SERPL-MCNC: 9.9 MG/DL (ref 8.8–10)
CHLORIDE SERPL-SCNC: 106 MMOL/L (ref 98–107)
CO2 SERPL-SCNC: 25 MMOL/L (ref 23–31)
CREAT SERPL-MCNC: 1.27 MG/DL (ref 0.73–1.18)
CREAT/UREA NIT SERPL: 20
CRP SERPL-MCNC: 2.8 MG/L
ELLIPTOCYTOSIS (OHS): SLIGHT
EOSINOPHIL # BLD AUTO: 0.45 X10(3)/MCL (ref 0–0.9)
EOSINOPHIL NFR BLD AUTO: 8.8 %
ERYTHROCYTE [DISTWIDTH] IN BLOOD BY AUTOMATED COUNT: 15.8 % (ref 11.5–17)
ERYTHROCYTE [SEDIMENTATION RATE] IN BLOOD: 21 MM/HR (ref 0–15)
GFR SERPLBLD CREATININE-BSD FMLA CKD-EPI: >60 ML/MIN/1.73/M2
GLOBULIN SER-MCNC: 3.5 GM/DL (ref 2.4–3.5)
GLUCOSE SERPL-MCNC: 124 MG/DL (ref 82–115)
HCT VFR BLD AUTO: 38.7 % (ref 42–52)
HGB BLD-MCNC: 12 G/DL (ref 14–18)
IGA SERPL-MCNC: 251 MG/DL (ref 101–645)
IGG SERPL-MCNC: 1121 MG/DL (ref 540–1822)
IGM SERPL-MCNC: 44 MG/DL (ref 22–240)
IMM GRANULOCYTES # BLD AUTO: 0.01 X10(3)/MCL (ref 0–0.04)
IMM GRANULOCYTES NFR BLD AUTO: 0.2 %
LYMPHOCYTES # BLD AUTO: 1.08 X10(3)/MCL (ref 0.6–4.6)
LYMPHOCYTES NFR BLD AUTO: 21.2 %
MCH RBC QN AUTO: 28.5 PG (ref 27–31)
MCHC RBC AUTO-ENTMCNC: 31 G/DL (ref 33–36)
MCV RBC AUTO: 91.9 FL (ref 80–94)
MONOCYTES # BLD AUTO: 0.43 X10(3)/MCL (ref 0.1–1.3)
MONOCYTES NFR BLD AUTO: 8.4 %
NEUTROPHILS # BLD AUTO: 3.09 X10(3)/MCL (ref 2.1–9.2)
NEUTROPHILS NFR BLD AUTO: 60.6 %
NRBC BLD AUTO-RTO: 0 %
PLATELET # BLD AUTO: 103 X10(3)/MCL (ref 130–400)
PLATELET # BLD EST: ABNORMAL 10*3/UL
PMV BLD AUTO: 11.7 FL (ref 7.4–10.4)
POTASSIUM SERPL-SCNC: 4.1 MMOL/L (ref 3.5–5.1)
PROT SERPL-MCNC: 7.4 GM/DL (ref 5.8–7.6)
RBC # BLD AUTO: 4.21 X10(6)/MCL (ref 4.7–6.1)
SCHISTOCYTE (OLG): SLIGHT
SODIUM SERPL-SCNC: 143 MMOL/L (ref 136–145)
WBC # BLD AUTO: 5.1 X10(3)/MCL (ref 4.5–11.5)

## 2024-08-09 PROCEDURE — 85652 RBC SED RATE AUTOMATED: CPT

## 2024-08-09 PROCEDURE — 36415 COLL VENOUS BLD VENIPUNCTURE: CPT

## 2024-08-09 PROCEDURE — 80053 COMPREHEN METABOLIC PANEL: CPT

## 2024-08-09 PROCEDURE — 82784 ASSAY IGA/IGD/IGG/IGM EACH: CPT

## 2024-08-09 PROCEDURE — 83521 IG LIGHT CHAINS FREE EACH: CPT

## 2024-08-09 PROCEDURE — 86039 ANTINUCLEAR ANTIBODIES (ANA): CPT

## 2024-08-09 PROCEDURE — 84165 PROTEIN E-PHORESIS SERUM: CPT

## 2024-08-09 PROCEDURE — 85025 COMPLETE CBC W/AUTO DIFF WBC: CPT

## 2024-08-09 PROCEDURE — 86334 IMMUNOFIX E-PHORESIS SERUM: CPT

## 2024-08-09 PROCEDURE — 86140 C-REACTIVE PROTEIN: CPT

## 2024-08-09 NOTE — PROGRESS NOTES
"Subjective:       Patient ID: Aba Dunbar is a 64 y.o. male.    Chief Complaint: "Low back pain"    Diagnosis:  Iron deficiency anemia  Vitamin B12 deficiency     Current Treatment:  Ferrous sulfate 325 mg b.i.d. (started 2/16/23); Monthly B12     Clinical History:  Patient was referred by his PCP for evaluation of anemia on routine laboratory.  CBC from 2/3/23 showed a hemoglobin of 8.8 and hematocrit 31.2 with an MCV of 79.8 and RDW 19.9.  WBC and platelet counts were normal.  Reticulocyte count was 1.6%.  Additional laboratory drawn 2/7/23 confirmed iron deficiency with a serum iron of 38, TIBC 348, saturation 10% and ferritin level 22 ng/mL.  Review of previous laboratory, shows longstanding anemia dating back at least 2 years.  He had symptoms of PICA for ice.  He denied any abdominal symptoms or complaints.  No weight loss.  No change in his bowel habits, no melena or hematochezia.  Screening colonoscopy approximately 6 months prior- small polyps removed.      He was seen as a new patient 2/15/23.  Treatment was recommended with ferrous sulfate 325 mg b.i.d. His anemia improved on treatment his PICA symptoms resolved.    EGD 5/18/23 showed mild gastritis and patchy erythema with ulceration about the lesser curvature of the stomach.  He was placed on Protonix.  Pathology showed no H pylori or evidence of malignancy.    Interval History  Mr. Dunbar is here today for a hematology follow-up visit.  He is ambulatory with a walker.  He has low back pain, which is his only complaint today.  He is followed by pain management, with plans for an injection in the next week or so.  He is taking OTC ferrous sulfate BID and monthly B12 injections as directed with no appreciable side effects.  He is on chronic suppression with Cipro and Doxy for chronic osteomyelitis of the left ankle.  ID is managing.  Laboratory records reviewed.  Patient has stable anemia.  Iron studies are pending.  He does have mild " "thrombocytopenia (103,000), which has been present since late 2023 and showing gradual progression.  Dr. Kingsley did additional laboratory workup 6/24 and again 8/9/24, showing an elevated sed rate and positive SAFIA.  He also has mildly elevated serum free light chains, but no detectable monoclonal protein.    Review of Systems   Constitutional:  Negative for appetite change, fatigue, fever and unexpected weight change.   HENT:  Negative for mouth sores, sore throat and trouble swallowing.    Eyes: Negative.  Negative for visual disturbance.   Respiratory:  Negative for cough and shortness of breath.    Cardiovascular:  Negative for chest pain, palpitations and leg swelling.   Gastrointestinal:  Negative for abdominal distention, abdominal pain, blood in stool, constipation, diarrhea and nausea.   Genitourinary:  Negative for dysuria, frequency and urgency.   Musculoskeletal:  Positive for arthralgias and back pain (Chronic, lower back).   Integumentary:  Negative for pallor and rash.   Neurological:  Negative for dizziness, weakness, numbness and headaches.   Hematological:  Negative for adenopathy. Does not bruise/bleed easily.   Psychiatric/Behavioral: Negative.  The patient is not nervous/anxious.          PMHx:  HTN, hyperlipidemia, CAD, DM, hypothyroid, arthritis, Charcot-Abbie-Tooth disease, GERD, RLS  PSHx:  Tonsils, bilateral ankle fusions, CABG  SH:  Former smoker 1 ppd, quit age 46.  Occasional alcohol use.  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  FH:  His father had colon cancer.    Objective:        BP (!) 143/84   Pulse 84   Temp 98.1 °F (36.7 °C)   Resp 16   Ht 5' 7" (1.702 m)   Wt 105.4 kg (232 lb 6.4 oz)   SpO2 98%   BMI 36.40 kg/m²    Physical Exam  Constitutional:       Comments: Morbidly obese white male in [facility], ambulatory with a rolling walker   HENT:      Head: Normocephalic and atraumatic.      Mouth/Throat:      Mouth: Mucous membranes are moist.      Pharynx: Oropharynx is " clear. No posterior oropharyngeal erythema.   Eyes:      Extraocular Movements: Extraocular movements intact.      Conjunctiva/sclera: Conjunctivae normal.      Pupils: Pupils are equal, round, and reactive to light.   Cardiovascular:      Rate and Rhythm: Normal rate and regular rhythm.      Heart sounds: No murmur heard.     Comments: Well-healed sternotomy incision  Pulmonary:      Effort: Pulmonary effort is normal.      Breath sounds: Normal breath sounds.   Abdominal:      General: Bowel sounds are normal. There is no distension.      Palpations: Abdomen is soft. There is no mass.      Tenderness: There is no abdominal tenderness.      Comments: Obese.   Musculoskeletal:         General: No swelling or tenderness. Normal range of motion.      Cervical back: Neck supple. No tenderness.   Lymphadenopathy:      Cervical: No cervical adenopathy.   Skin:     General: Skin is warm and dry.      Findings: No rash.   Neurological:      General: No focal deficit present.      Mental Status: He is alert and oriented to person, place, and time.      Cranial Nerves: No cranial nerve deficit.      Motor: No weakness.            LABORATORY  Recent Results (from the past 168 hour(s))   Comprehensive Metabolic Panel    Collection Time: 08/09/24  7:04 AM   Result Value Ref Range    Sodium 143 136 - 145 mmol/L    Potassium 4.1 3.5 - 5.1 mmol/L    Chloride 106 98 - 107 mmol/L    CO2 25 23 - 31 mmol/L    Glucose 124 (H) 82 - 115 mg/dL    Blood Urea Nitrogen 26.0 (H) 8.4 - 25.7 mg/dL    Creatinine 1.27 (H) 0.73 - 1.18 mg/dL    Calcium 9.9 8.8 - 10.0 mg/dL    Protein Total 7.4 5.8 - 7.6 gm/dL    Albumin 3.9 3.4 - 4.8 g/dL    Globulin 3.5 2.4 - 3.5 gm/dL    Albumin/Globulin Ratio 1.1 1.1 - 2.0 ratio    Bilirubin Total 0.4 <=1.5 mg/dL     40 - 150 unit/L    ALT 19 0 - 55 unit/L    AST 31 5 - 34 unit/L    eGFR >60 mL/min/1.73/m2    Anion Gap 12.0 mEq/L    BUN/Creatinine Ratio 20    C-Reactive Protein    Collection Time:  08/09/24  7:04 AM   Result Value Ref Range    CRP 2.80 <5.00 mg/L   Sedimentation rate    Collection Time: 08/09/24  7:04 AM   Result Value Ref Range    Sed Rate 21 (H) 0 - 15 mm/hr   CBC with Differential    Collection Time: 08/09/24  7:04 AM   Result Value Ref Range    WBC 5.10 4.50 - 11.50 x10(3)/mcL    RBC 4.21 (L) 4.70 - 6.10 x10(6)/mcL    Hgb 12.0 (L) 14.0 - 18.0 g/dL    Hct 38.7 (L) 42.0 - 52.0 %    MCV 91.9 80.0 - 94.0 fL    MCH 28.5 27.0 - 31.0 pg    MCHC 31.0 (L) 33.0 - 36.0 g/dL    RDW 15.8 11.5 - 17.0 %    Platelet 103 (L) 130 - 400 x10(3)/mcL    MPV 11.7 (H) 7.4 - 10.4 fL    Neut % 60.6 %    Lymph % 21.2 %    Mono % 8.4 %    Eos % 8.8 %    Basophil % 0.8 %    Lymph # 1.08 0.6 - 4.6 x10(3)/mcL    Neut # 3.09 2.1 - 9.2 x10(3)/mcL    Mono # 0.43 0.1 - 1.3 x10(3)/mcL    Eos # 0.45 0 - 0.9 x10(3)/mcL    Baso # 0.04 <=0.2 x10(3)/mcL    IG# 0.01 0 - 0.04 x10(3)/mcL    IG% 0.2 %    NRBC% 0.0 %   Immunoglobulins (IgG, IgA, IgM) Quantitative    Collection Time: 08/09/24  7:04 AM   Result Value Ref Range    IgG Level 1,121.00 540.00 - 1,822.00 mg/dL    IgA Level 251.0 101.0 - 645.0 mg/dL    IgM Level 44.0 22.0 - 240.0 mg/dL   Immunoglobulin Free LT Chains Blood    Collection Time: 08/09/24  7:04 AM   Result Value Ref Range    Kappa Free Light Chain 4.46 (H) 0.3300 - 1.94 mg/dL    Lambda Free Light Chain 3.23 (H) 0.5700 - 2.63 mg/dL    Kappa/Lambda FLC Ratio 1.38 0.2600 - 1.65   Blood Smear Microscopic Exam    Collection Time: 08/09/24  7:04 AM   Result Value Ref Range    Acanthocytes Slight (A) (none)    Elliptocytosis Slight (A) (none)    Schistocytes Slight (A) (none)    Platelets Decreased (A) Normal, Adequate       Assessment:   Iron deficiency anemia - Improved  Vitamin B12 deficiency - resolved  Thrombocytopenia    Plan:   Patient has stable mild anemia, which is likely secondary to chronic disease and inflammation.  Continue FeSO4 b.i.d. and monthly B12 injections.  Await results of ferritin level, which is  pending.  Additional laboratory drawn today for anti-platelet antibody testing.  I will contact patient with results once available.  Platelet count is stable and patient has no associated bleeding.  RTC in 3 months for a follow-up visit physician with same day CBC.  All questions answered.    RADHA PENALOZA, FNP-C  Can[facility] [place]       Other Physicians  Dr. Ventura Erickson

## 2024-08-12 ENCOUNTER — OFFICE VISIT (OUTPATIENT)
Dept: HEMATOLOGY/ONCOLOGY | Facility: CLINIC | Age: 64
End: 2024-08-12
Payer: MEDICARE

## 2024-08-12 VITALS
OXYGEN SATURATION: 98 % | TEMPERATURE: 98 F | RESPIRATION RATE: 16 BRPM | HEART RATE: 84 BPM | BODY MASS INDEX: 36.47 KG/M2 | DIASTOLIC BLOOD PRESSURE: 84 MMHG | SYSTOLIC BLOOD PRESSURE: 143 MMHG | HEIGHT: 67 IN | WEIGHT: 232.38 LBS

## 2024-08-12 DIAGNOSIS — D69.6 THROMBOCYTOPENIA: ICD-10-CM

## 2024-08-12 DIAGNOSIS — D51.8 OTHER VITAMIN B12 DEFICIENCY ANEMIA: ICD-10-CM

## 2024-08-12 DIAGNOSIS — D50.9 IRON DEFICIENCY ANEMIA, UNSPECIFIED IRON DEFICIENCY ANEMIA TYPE: Primary | ICD-10-CM

## 2024-08-12 LAB
ALBUMIN % SPEP (OHS): 51.18 (ref 48.1–59.5)
ALBUMIN SERPL-MCNC: 3.5 G/DL (ref 3.4–4.8)
ALBUMIN/GLOB SERPL: 1 RATIO (ref 1.1–2)
ALPHA 1 GLOB (OHS): 0.25 GM/DL (ref 0–0.4)
ALPHA 1 GLOB% (OHS): 3.69 (ref 2.3–4.9)
ALPHA 2 GLOB % (OHS): 12.9 (ref 6.9–13)
ALPHA 2 GLOB (OHS): 0.89 GM/DL (ref 0.4–1)
ANA PAT SER IF-IMP: ABNORMAL
ANA SER QL HEP2 SUBST: ABNORMAL
ANA TITR SER HEP2 SUBST: ABNORMAL {TITER}
BETA GLOB (OHS): 1.05 GM/DL (ref 0.7–1.3)
BETA GLOB% (OHS): 15.27 (ref 13.8–19.7)
FERRITIN SERPL-MCNC: 124.37 NG/ML (ref 21.81–274.66)
GAMMA GLOBULIN % (OHS): 16.96 (ref 10.1–21.9)
GAMMA GLOBULIN (OHS): 1.17 GM/DL (ref 0.4–1.8)
GLOBULIN SER-MCNC: 3.4 GM/DL (ref 2.4–3.5)
IRON SATN MFR SERPL: 28 % (ref 20–50)
IRON SERPL-MCNC: 75 UG/DL (ref 65–175)
KAPPA LC FREE SER NEPH-MCNC: 4.46 MG/DL (ref 0.33–1.94)
KAPPA LC FREE/LAMBDA FREE SER NEPH: 1.38 {RATIO} (ref 0.26–1.65)
LAMBDA LC FREE SER NEPH-MCNC: 3.23 MG/DL (ref 0.57–2.63)
M SPIKE % (OHS): ABNORMAL
M SPIKE (OHS): ABNORMAL
PATH REV: NORMAL
PROT SERPL-MCNC: 6.9 GM/DL (ref 5.8–7.6)
TIBC SERPL-MCNC: 192 UG/DL (ref 69–240)
TIBC SERPL-MCNC: 267 UG/DL (ref 250–450)
TRANSFERRIN SERPL-MCNC: 254 MG/DL (ref 163–344)

## 2024-08-12 PROCEDURE — 1160F RVW MEDS BY RX/DR IN RCRD: CPT | Mod: CPTII,S$GLB,, | Performed by: NURSE PRACTITIONER

## 2024-08-12 PROCEDURE — 3061F NEG MICROALBUMINURIA REV: CPT | Mod: CPTII,S$GLB,, | Performed by: NURSE PRACTITIONER

## 2024-08-12 PROCEDURE — 36415 COLL VENOUS BLD VENIPUNCTURE: CPT | Performed by: NURSE PRACTITIONER

## 2024-08-12 PROCEDURE — 99214 OFFICE O/P EST MOD 30 MIN: CPT | Mod: S$GLB,,, | Performed by: NURSE PRACTITIONER

## 2024-08-12 PROCEDURE — 3079F DIAST BP 80-89 MM HG: CPT | Mod: CPTII,S$GLB,, | Performed by: NURSE PRACTITIONER

## 2024-08-12 PROCEDURE — 3066F NEPHROPATHY DOC TX: CPT | Mod: CPTII,S$GLB,, | Performed by: NURSE PRACTITIONER

## 2024-08-12 PROCEDURE — 86023 IMMUNOGLOBULIN ASSAY: CPT | Performed by: NURSE PRACTITIONER

## 2024-08-12 PROCEDURE — 3008F BODY MASS INDEX DOCD: CPT | Mod: CPTII,S$GLB,, | Performed by: NURSE PRACTITIONER

## 2024-08-12 PROCEDURE — 3288F FALL RISK ASSESSMENT DOCD: CPT | Mod: CPTII,S$GLB,, | Performed by: NURSE PRACTITIONER

## 2024-08-12 PROCEDURE — 83550 IRON BINDING TEST: CPT | Performed by: NURSE PRACTITIONER

## 2024-08-12 PROCEDURE — 1159F MED LIST DOCD IN RCRD: CPT | Mod: CPTII,S$GLB,, | Performed by: NURSE PRACTITIONER

## 2024-08-12 PROCEDURE — 83540 ASSAY OF IRON: CPT | Performed by: NURSE PRACTITIONER

## 2024-08-12 PROCEDURE — 82728 ASSAY OF FERRITIN: CPT | Performed by: NURSE PRACTITIONER

## 2024-08-12 PROCEDURE — 99999 PR PBB SHADOW E&M-EST. PATIENT-LVL IV: CPT | Mod: PBBFAC,,, | Performed by: NURSE PRACTITIONER

## 2024-08-12 PROCEDURE — 3044F HG A1C LEVEL LT 7.0%: CPT | Mod: CPTII,S$GLB,, | Performed by: NURSE PRACTITIONER

## 2024-08-12 PROCEDURE — 1101F PT FALLS ASSESS-DOCD LE1/YR: CPT | Mod: CPTII,S$GLB,, | Performed by: NURSE PRACTITIONER

## 2024-08-12 PROCEDURE — 3077F SYST BP >= 140 MM HG: CPT | Mod: CPTII,S$GLB,, | Performed by: NURSE PRACTITIONER

## 2024-08-12 PROCEDURE — 4010F ACE/ARB THERAPY RXD/TAKEN: CPT | Mod: CPTII,S$GLB,, | Performed by: NURSE PRACTITIONER

## 2024-08-12 RX ORDER — GABAPENTIN 300 MG/1
300 CAPSULE ORAL
COMMUNITY

## 2024-08-14 LAB
PA IGG BLD QL FC: NEGATIVE
PA IGM BLD QL FC: NEGATIVE

## 2024-10-01 ENCOUNTER — HOSPITAL ENCOUNTER (EMERGENCY)
Facility: HOSPITAL | Age: 64
Discharge: ANOTHER HOSPITAL | End: 2024-10-01
Attending: STUDENT IN AN ORGANIZED HEALTH CARE EDUCATION/TRAINING PROGRAM
Payer: MEDICARE

## 2024-10-01 VITALS
HEART RATE: 110 BPM | HEIGHT: 67 IN | TEMPERATURE: 99 F | DIASTOLIC BLOOD PRESSURE: 86 MMHG | BODY MASS INDEX: 35.31 KG/M2 | WEIGHT: 225 LBS | OXYGEN SATURATION: 97 % | RESPIRATION RATE: 20 BRPM | SYSTOLIC BLOOD PRESSURE: 137 MMHG

## 2024-10-01 DIAGNOSIS — E83.42 HYPOMAGNESEMIA: ICD-10-CM

## 2024-10-01 DIAGNOSIS — R00.0 TACHYCARDIA: ICD-10-CM

## 2024-10-01 DIAGNOSIS — E86.0 DEHYDRATION: ICD-10-CM

## 2024-10-01 DIAGNOSIS — N30.01 ACUTE CYSTITIS WITH HEMATURIA: Primary | ICD-10-CM

## 2024-10-01 DIAGNOSIS — I21.4 NSTEMI (NON-ST ELEVATED MYOCARDIAL INFARCTION): ICD-10-CM

## 2024-10-01 LAB
ALBUMIN SERPL-MCNC: 3.4 G/DL (ref 3.4–4.8)
ALBUMIN/GLOB SERPL: 0.9 RATIO (ref 1.1–2)
ALP SERPL-CCNC: 147 UNIT/L (ref 40–150)
ALT SERPL-CCNC: 49 UNIT/L (ref 0–55)
ANION GAP SERPL CALC-SCNC: 12 MEQ/L
APTT PPP: 28.1 SECONDS (ref 21.4–28.3)
AST SERPL-CCNC: 56 UNIT/L (ref 5–34)
BACTERIA #/AREA URNS AUTO: ABNORMAL /HPF
BASOPHILS # BLD AUTO: 0.03 X10(3)/MCL
BASOPHILS NFR BLD AUTO: 0.2 %
BILIRUB SERPL-MCNC: 1.3 MG/DL
BILIRUB UR QL STRIP.AUTO: ABNORMAL
BUN SERPL-MCNC: 24 MG/DL (ref 8.4–25.7)
CALCIUM SERPL-MCNC: 9.8 MG/DL (ref 8.8–10)
CHLORIDE SERPL-SCNC: 101 MMOL/L (ref 98–107)
CK SERPL-CCNC: 250 U/L (ref 30–200)
CLARITY UR: ABNORMAL
CO2 SERPL-SCNC: 25 MMOL/L (ref 23–31)
COLOR UR AUTO: ABNORMAL
CREAT SERPL-MCNC: 1.19 MG/DL (ref 0.73–1.18)
CREAT/UREA NIT SERPL: 20
EOSINOPHIL # BLD AUTO: 0.06 X10(3)/MCL (ref 0–0.9)
EOSINOPHIL NFR BLD AUTO: 0.5 %
ERYTHROCYTE [DISTWIDTH] IN BLOOD BY AUTOMATED COUNT: 16.4 % (ref 11.5–17)
GFR SERPLBLD CREATININE-BSD FMLA CKD-EPI: >60 ML/MIN/1.73/M2
GLOBULIN SER-MCNC: 3.9 GM/DL (ref 2.4–3.5)
GLUCOSE SERPL-MCNC: 152 MG/DL (ref 82–115)
GLUCOSE UR QL STRIP: NEGATIVE
HCT VFR BLD AUTO: 34.9 % (ref 42–52)
HGB BLD-MCNC: 11.3 G/DL (ref 14–18)
HGB UR QL STRIP: ABNORMAL
IMM GRANULOCYTES # BLD AUTO: 0.06 X10(3)/MCL (ref 0–0.04)
IMM GRANULOCYTES NFR BLD AUTO: 0.5 %
INR PPP: 1.1
KETONES UR QL STRIP: NEGATIVE
LACTATE SERPL-SCNC: 1.8 MMOL/L (ref 0.5–2.2)
LEUKOCYTE ESTERASE UR QL STRIP: ABNORMAL
LYMPHOCYTES # BLD AUTO: 1.03 X10(3)/MCL (ref 0.6–4.6)
LYMPHOCYTES NFR BLD AUTO: 8.4 %
MAGNESIUM SERPL-MCNC: 1.4 MG/DL (ref 1.6–2.6)
MCH RBC QN AUTO: 28.8 PG (ref 27–31)
MCHC RBC AUTO-ENTMCNC: 32.4 G/DL (ref 33–36)
MCV RBC AUTO: 88.8 FL (ref 80–94)
MONOCYTES # BLD AUTO: 1 X10(3)/MCL (ref 0.1–1.3)
MONOCYTES NFR BLD AUTO: 8.2 %
NEUTROPHILS # BLD AUTO: 10.04 X10(3)/MCL (ref 2.1–9.2)
NEUTROPHILS NFR BLD AUTO: 82.2 %
NITRITE UR QL STRIP: POSITIVE
NRBC BLD AUTO-RTO: 0 %
OHS QRS DURATION: 82 MS
OHS QTC CALCULATION: 638 MS
PH UR STRIP: 6 [PH]
PLATELET # BLD AUTO: 111 X10(3)/MCL (ref 130–400)
PMV BLD AUTO: 10.8 FL (ref 7.4–10.4)
POTASSIUM SERPL-SCNC: 3.7 MMOL/L (ref 3.5–5.1)
PROT SERPL-MCNC: 7.3 GM/DL (ref 5.8–7.6)
PROT UR QL STRIP: ABNORMAL
PROTHROMBIN TIME: 11.1 SECONDS (ref 9.1–10.9)
RBC # BLD AUTO: 3.93 X10(6)/MCL (ref 4.7–6.1)
RBC #/AREA URNS AUTO: >100 /HPF
SODIUM SERPL-SCNC: 138 MMOL/L (ref 136–145)
SP GR UR STRIP.AUTO: 1.02 (ref 1–1.03)
SQUAMOUS #/AREA URNS AUTO: ABNORMAL /HPF
TROPONIN I SERPL-MCNC: 0.09 NG/ML (ref 0–0.04)
TROPONIN I SERPL-MCNC: 0.1 NG/ML (ref 0–0.04)
TROPONIN I SERPL-MCNC: 0.11 NG/ML (ref 0–0.04)
UROBILINOGEN UR STRIP-ACNC: 1
WBC # BLD AUTO: 12.22 X10(3)/MCL (ref 4.5–11.5)
WBC #/AREA URNS AUTO: ABNORMAL /HPF

## 2024-10-01 PROCEDURE — 96366 THER/PROPH/DIAG IV INF ADDON: CPT

## 2024-10-01 PROCEDURE — 87186 SC STD MICRODIL/AGAR DIL: CPT | Performed by: STUDENT IN AN ORGANIZED HEALTH CARE EDUCATION/TRAINING PROGRAM

## 2024-10-01 PROCEDURE — 81001 URINALYSIS AUTO W/SCOPE: CPT | Performed by: STUDENT IN AN ORGANIZED HEALTH CARE EDUCATION/TRAINING PROGRAM

## 2024-10-01 PROCEDURE — 93005 ELECTROCARDIOGRAM TRACING: CPT

## 2024-10-01 PROCEDURE — 96365 THER/PROPH/DIAG IV INF INIT: CPT

## 2024-10-01 PROCEDURE — 87086 URINE CULTURE/COLONY COUNT: CPT | Performed by: STUDENT IN AN ORGANIZED HEALTH CARE EDUCATION/TRAINING PROGRAM

## 2024-10-01 PROCEDURE — 80053 COMPREHEN METABOLIC PANEL: CPT | Performed by: STUDENT IN AN ORGANIZED HEALTH CARE EDUCATION/TRAINING PROGRAM

## 2024-10-01 PROCEDURE — 99285 EMERGENCY DEPT VISIT HI MDM: CPT | Mod: 25

## 2024-10-01 PROCEDURE — 85610 PROTHROMBIN TIME: CPT | Performed by: STUDENT IN AN ORGANIZED HEALTH CARE EDUCATION/TRAINING PROGRAM

## 2024-10-01 PROCEDURE — 84484 ASSAY OF TROPONIN QUANT: CPT | Performed by: STUDENT IN AN ORGANIZED HEALTH CARE EDUCATION/TRAINING PROGRAM

## 2024-10-01 PROCEDURE — 96367 TX/PROPH/DG ADDL SEQ IV INF: CPT

## 2024-10-01 PROCEDURE — 82550 ASSAY OF CK (CPK): CPT | Performed by: STUDENT IN AN ORGANIZED HEALTH CARE EDUCATION/TRAINING PROGRAM

## 2024-10-01 PROCEDURE — 87040 BLOOD CULTURE FOR BACTERIA: CPT | Performed by: STUDENT IN AN ORGANIZED HEALTH CARE EDUCATION/TRAINING PROGRAM

## 2024-10-01 PROCEDURE — 25000003 PHARM REV CODE 250: Performed by: STUDENT IN AN ORGANIZED HEALTH CARE EDUCATION/TRAINING PROGRAM

## 2024-10-01 PROCEDURE — 63600175 PHARM REV CODE 636 W HCPCS: Performed by: STUDENT IN AN ORGANIZED HEALTH CARE EDUCATION/TRAINING PROGRAM

## 2024-10-01 PROCEDURE — 83735 ASSAY OF MAGNESIUM: CPT | Performed by: STUDENT IN AN ORGANIZED HEALTH CARE EDUCATION/TRAINING PROGRAM

## 2024-10-01 PROCEDURE — 85730 THROMBOPLASTIN TIME PARTIAL: CPT | Performed by: STUDENT IN AN ORGANIZED HEALTH CARE EDUCATION/TRAINING PROGRAM

## 2024-10-01 PROCEDURE — 85025 COMPLETE CBC W/AUTO DIFF WBC: CPT | Performed by: STUDENT IN AN ORGANIZED HEALTH CARE EDUCATION/TRAINING PROGRAM

## 2024-10-01 PROCEDURE — 83605 ASSAY OF LACTIC ACID: CPT | Performed by: STUDENT IN AN ORGANIZED HEALTH CARE EDUCATION/TRAINING PROGRAM

## 2024-10-01 PROCEDURE — 81003 URINALYSIS AUTO W/O SCOPE: CPT | Performed by: STUDENT IN AN ORGANIZED HEALTH CARE EDUCATION/TRAINING PROGRAM

## 2024-10-01 RX ORDER — SODIUM CHLORIDE, SODIUM LACTATE, POTASSIUM CHLORIDE, CALCIUM CHLORIDE 600; 310; 30; 20 MG/100ML; MG/100ML; MG/100ML; MG/100ML
INJECTION, SOLUTION INTRAVENOUS CONTINUOUS
Status: DISCONTINUED | OUTPATIENT
Start: 2024-10-01 | End: 2024-10-01 | Stop reason: HOSPADM

## 2024-10-01 RX ORDER — ACETAMINOPHEN 325 MG/1
650 TABLET ORAL
Status: COMPLETED | OUTPATIENT
Start: 2024-10-01 | End: 2024-10-01

## 2024-10-01 RX ORDER — MAGNESIUM SULFATE HEPTAHYDRATE 40 MG/ML
2 INJECTION, SOLUTION INTRAVENOUS
Status: COMPLETED | OUTPATIENT
Start: 2024-10-01 | End: 2024-10-01

## 2024-10-01 RX ORDER — SODIUM CHLORIDE 9 MG/ML
1000 INJECTION, SOLUTION INTRAVENOUS
Status: COMPLETED | OUTPATIENT
Start: 2024-10-01 | End: 2024-10-01

## 2024-10-01 RX ORDER — NAPROXEN SODIUM 220 MG/1
162 TABLET, FILM COATED ORAL
Status: COMPLETED | OUTPATIENT
Start: 2024-10-01 | End: 2024-10-01

## 2024-10-01 RX ADMIN — SODIUM CHLORIDE, POTASSIUM CHLORIDE, SODIUM LACTATE AND CALCIUM CHLORIDE: 600; 310; 30; 20 INJECTION, SOLUTION INTRAVENOUS at 08:10

## 2024-10-01 RX ADMIN — SODIUM CHLORIDE 1000 ML: 9 INJECTION, SOLUTION INTRAVENOUS at 08:10

## 2024-10-01 RX ADMIN — ACETAMINOPHEN 650 MG: 325 TABLET, FILM COATED ORAL at 07:10

## 2024-10-01 RX ADMIN — CEFTRIAXONE SODIUM 1 G: 1 INJECTION, POWDER, FOR SOLUTION INTRAMUSCULAR; INTRAVENOUS at 11:10

## 2024-10-01 RX ADMIN — SODIUM CHLORIDE, POTASSIUM CHLORIDE, SODIUM LACTATE AND CALCIUM CHLORIDE: 600; 310; 30; 20 INJECTION, SOLUTION INTRAVENOUS at 10:10

## 2024-10-01 RX ADMIN — ASPIRIN 81 MG 162 MG: 81 TABLET ORAL at 09:10

## 2024-10-01 RX ADMIN — MAGNESIUM SULFATE HEPTAHYDRATE 2 G: 40 INJECTION, SOLUTION INTRAVENOUS at 09:10

## 2024-10-01 NOTE — CONSULTS
Inpatient consult to Telemedicine-Card  Consult performed by: Carlos Headley AGACNP-BC  Consult ordered by: Romel Gutiérrez MD  Reason for consult: elevated troponin        Telecardiology Consult  From: [facility]  DOS: 10/1/2024   Requesting Physician: Dr. Gutiérrez  Reason for consult: elevated troponin  ER consult  Duration of consult: 36 minutes      [facility]  Telecardiology  Consult Note    Patient Name: Aba Dunbar  MRN: [id number]  Admission Date: 10/1/2024  Hospital Length of Stay: 0 days  Code Status: No Order   Attending Provider: Romel Gutiérrez MD   Consulting Provider: CELINE Griffiths  Primary Care Physician: Ventura Parsons DO  Principal Problem:<principal problem not specified>    Patient information was obtained from patient, past medical records, and ER records.     Subjective:     Chief Complaint:  Consulted for elevated troponin     HPI:   This is a 64-year-old male, who is unknown to [facility] and who is followed by Dr. Devlin, with history of CAD/CABG x 3 (LIMA to LAD, SVG to OM, SVG to RCA) and PCI to RCA, HTN, HLD, DM type II, Charcot-Abbie-Tooth disease hypothyroidism, muscular dystrophy, RITO/CPAP, restless leg syndrome.  He presented to the ER on 10.1.24 with complaints of dysuria and polyuria as well as hematuria.  He denied chest pain, shortness of breath.  He stated he has been more weak lately which has been causing him to fall.  He denied any trauma and stated that he typically falls onto his butt.  He stated he does have some dizziness as well.  EKG revealed accelerated junctional rhythm with prolonged QTc.  Initial troponin was mildly elevated at 0.089.  He was also found to have leukocytosis, mild anemia, and hypomagnesemia.  [facility] was consulted for elevated troponin.      PMH: CAD/CABG x 3 (LIMA to LAD, SVG to OM, SVG to RCA) and PCI to RCA, HTN, HLD, DM type II, Charcot-Abbie-Tooth disease hypothyroidism, muscular  dystrophy, RITO/CPAP, restless leg syndrome  PSH: CABG x 3 (LIMA to LAD, SVG to OM, SVG to RCA), LHC/stent, Ankle surgery, colonoscopy, tonsillectomy  Social History: Former tobacco use, denies EtOH use, marijuana use approximately 4 times a week  Family History: Mother-CVA, DM type II; father-colon cancer, DM type II    Previous Cardiac Diagnostics:   [facility] 1.22.19  Left main coronary artery is a large caliber vessel originating from the left coronary sinus bifurcating to the left and descending in the left circumflex arteries no evidence of any obstructive disease noted.   Left anterior descending artery is a medium caliber vessel origin from the left main coronary artery traversing anterior interventricular groove   to reach the apex of the heart. The mid segment of this vessel is noted to have competitive flow from LIMA.   Left circumflex artery is a codominant vessel origin from the left main coronary artery traversing in the  left interventricular groove.  This vessel gives her to multiple obtuse marginal branches which are free   of any significant atherosclerotic obstructive disease.   Right coronary artery is a medium caliber vessel originating from the right coronary sinus traversing right intraventricular groove, the proximal to mid segment of this vessel is noted to have segmental 40-50% non-obstructive disease.   Patent RUFF --> LAD   Occluded SVG --> OM   Occluded SVG --> RCA   Left ventricle is of normal size and configuration normal overall left ventricular systolic function ejection fraction 50%.         Review of patient's allergies indicates:   Allergen Reactions    Hydrocodone      Other reaction(s): hyperactive  can't sleep    Hydrocodone-acetaminophen        No current facility-administered medications on file prior to encounter.     Current Outpatient Medications on File Prior to Encounter   Medication Sig    acetaminophen (TYLENOL ARTHRITIS PAIN) 650 MG TbSR Take 1,300 mg by mouth daily as  needed (Pain).    aspirin (ECOTRIN) 81 MG EC tablet Take 81 mg by mouth once daily.    atorvastatin (LIPITOR) 40 MG tablet TAKE 1 TABLET BY MOUTH EVERY DAY    carvediloL (COREG) 12.5 MG tablet Take 12.5 mg by mouth 2 (two) times daily.    ciprofloxacin HCl (CIPRO) 500 MG tablet Take 500 mg by mouth 2 (two) times daily.    cyanocobalamin 1,000 mcg/mL injection INJECT 1 ML INTRAMUSCULARLY PER WEEK FOR 4 WEEKS, THEN INJECT 1 ML INTRAMUSCULARLY ONCE A MONTH    doxycycline (VIBRA-TABS) 100 MG tablet Take 100 mg by mouth 2 (two) times daily.    ferrous sulfate (FEOSOL) 325 mg (65 mg iron) Tab tablet Take 325 mg by mouth 2 (two) times daily.    fluticasone propionate (FLONASE) 50 mcg/actuation nasal spray 2 sprays by Each Nostril route Daily.    furosemide (LASIX) 20 MG tablet TAKE 1 TABLET BY MOUTH EVERY DAY AS NEEDED    gabapentin (NEURONTIN) 300 MG capsule Take 300 mg by mouth.    levothyroxine (SYNTHROID) 50 MCG tablet TAKE 1 TABLET BY MOUTH EVERY DAY    metFORMIN (GLUCOPHAGE) 1000 MG tablet Take 1 tablet (1,000 mg total) by mouth 2 (two) times daily.    omeprazole (PRILOSEC) 20 MG capsule Take 40 mg by mouth every morning.    pantoprazole (PROTONIX) 40 MG tablet TAKE 1 TABLET BY MOUTH EVERY DAY    ramipriL (ALTACE) 2.5 MG capsule Take 2.5 mg by mouth once daily.       Review of Systems:  Review of Systems   Constitutional: Negative.    Eyes: Negative.    Respiratory: Negative.     Cardiovascular: Negative.    Gastrointestinal: Negative.    Endocrine: Negative.    Genitourinary:  Positive for dysuria and hematuria.   Musculoskeletal: Negative.    Skin: Negative.    Allergic/Immunologic: Negative.    Neurological: Negative.    Hematological: Negative.    Psychiatric/Behavioral: Negative.         Objective:     Vital Signs (Most Recent):  Temp: 98.8 °F (37.1 °C) (10/01/24 0817)  Pulse: 97 (10/01/24 0917)  Resp: 18 (10/01/24 0817)  BP: 96/72 (10/01/24 0917)  SpO2: 98 % (10/01/24 0917) Vital Signs (24h Range):  Temp:   [98.8 °F (37.1 °C)] 98.8 °F (37.1 °C)  Pulse:  [] 97  Resp:  [18] 18  SpO2:  [94 %-98 %] 98 %  BP: ()/(70-95) 96/72     Weight: 102.1 kg (225 lb)  Body mass index is 35.24 kg/m².    SpO2: 98 %       No intake or output data in the 24 hours ending 10/01/24 1000    Lines/Drains/Airways       Peripheral Intravenous Line  Duration                  Peripheral IV - Single Lumen 10/01/24 0837 20 G Left Antecubital <1 day                      Significant Labs:   Chemistries:   Recent Labs   Lab 10/01/24  0835      K 3.7      CO2 25   BUN 24.0   CREATININE 1.19*   CALCIUM 9.8   BILITOT 1.3   ALKPHOS 147   ALT 49   AST 56*   GLUCOSE 152*   MG 1.40*   TROPONINI 0.089*        CBC/Anemia Labs: Coags:    Recent Labs   Lab 10/01/24  0835   WBC 12.22*   HGB 11.3*   HCT 34.9*   *   MCV 88.8   RDW 16.4    Recent Labs   Lab 10/01/24  0848   INR 1.1   APTT 28.1            Significant Imaging:   Imaging Results              X-Ray Chest 1 View (Final result)  Result time 10/01/24 09:51:14      Final result by Misha Shearer MD (10/01/24 09:51:14)                   Impression:      No acute chest disease is identified.      Electronically signed by: Misha Shearer  Date:    10/01/2024  Time:    09:51               Narrative:    EXAMINATION:  XR CHEST 1 VIEW    CLINICAL HISTORY:  nstemi;, .    FINDINGS:  No alveolar consolidation, effusion, or pneumothorax is seen.   The thoracic aorta is normal  cardiac silhouette, central pulmonary vessels and mediastinum are normal in size and are grossly unremarkable.   visualized osseous structures are grossly unremarkable.                                       CT Head Without Contrast (In process)                       EKG:        Telemetry:      Physical Exam:  Physical Exam  HENT:      Head: Atraumatic.   Eyes:      Extraocular Movements: Extraocular movements intact.   Cardiovascular:      Rate and Rhythm: Normal rate and regular rhythm.      Heart sounds:  Normal heart sounds.   Pulmonary:      Effort: Pulmonary effort is normal.   Neurological:      General: No focal deficit present.      Mental Status: He is alert and oriented to person, place, and time.   Psychiatric:         Mood and Affect: Mood normal.         Behavior: Behavior normal.         Home Medications:   No current facility-administered medications on file prior to encounter.     Current Outpatient Medications on File Prior to Encounter   Medication Sig Dispense Refill    acetaminophen (TYLENOL ARTHRITIS PAIN) 650 MG TbSR Take 1,300 mg by mouth daily as needed (Pain).      aspirin (ECOTRIN) 81 MG EC tablet Take 81 mg by mouth once daily.      atorvastatin (LIPITOR) 40 MG tablet TAKE 1 TABLET BY MOUTH EVERY DAY 90 tablet 1    carvediloL (COREG) 12.5 MG tablet Take 12.5 mg by mouth 2 (two) times daily.      ciprofloxacin HCl (CIPRO) 500 MG tablet Take 500 mg by mouth 2 (two) times daily.      cyanocobalamin 1,000 mcg/mL injection INJECT 1 ML INTRAMUSCULARLY PER WEEK FOR 4 WEEKS, THEN INJECT 1 ML INTRAMUSCULARLY ONCE A MONTH      doxycycline (VIBRA-TABS) 100 MG tablet Take 100 mg by mouth 2 (two) times daily.      ferrous sulfate (FEOSOL) 325 mg (65 mg iron) Tab tablet Take 325 mg by mouth 2 (two) times daily.      fluticasone propionate (FLONASE) 50 mcg/actuation nasal spray 2 sprays by Each Nostril route Daily.      furosemide (LASIX) 20 MG tablet TAKE 1 TABLET BY MOUTH EVERY DAY AS NEEDED 90 tablet 3    gabapentin (NEURONTIN) 300 MG capsule Take 300 mg by mouth.      levothyroxine (SYNTHROID) 50 MCG tablet TAKE 1 TABLET BY MOUTH EVERY DAY 90 tablet 1    metFORMIN (GLUCOPHAGE) 1000 MG tablet Take 1 tablet (1,000 mg total) by mouth 2 (two) times daily. 180 tablet 1    omeprazole (PRILOSEC) 20 MG capsule Take 40 mg by mouth every morning.      pantoprazole (PROTONIX) 40 MG tablet TAKE 1 TABLET BY MOUTH EVERY DAY 90 tablet 3    ramipriL (ALTACE) 2.5 MG capsule Take 2.5 mg by mouth once daily.          Current Inpatient Medications:    Current Facility-Administered Medications:     magnesium sulfate 2g in water 50mL IVPB (premix), 2 g, Intravenous, ED 1 Time, Romel Gutiérrez MD, Last Rate: 25 mL/hr at 10/01/24 0910, 2 g at 10/01/24 0910    Current Outpatient Medications:     acetaminophen (TYLENOL ARTHRITIS PAIN) 650 MG TbSR, Take 1,300 mg by mouth daily as needed (Pain)., Disp: , Rfl:     aspirin (ECOTRIN) 81 MG EC tablet, Take 81 mg by mouth once daily., Disp: , Rfl:     atorvastatin (LIPITOR) 40 MG tablet, TAKE 1 TABLET BY MOUTH EVERY DAY, Disp: 90 tablet, Rfl: 1    carvediloL (COREG) 12.5 MG tablet, Take 12.5 mg by mouth 2 (two) times daily., Disp: , Rfl:     ciprofloxacin HCl (CIPRO) 500 MG tablet, Take 500 mg by mouth 2 (two) times daily., Disp: , Rfl:     cyanocobalamin 1,000 mcg/mL injection, INJECT 1 ML INTRAMUSCULARLY PER WEEK FOR 4 WEEKS, THEN INJECT 1 ML INTRAMUSCULARLY ONCE A MONTH, Disp: , Rfl:     doxycycline (VIBRA-TABS) 100 MG tablet, Take 100 mg by mouth 2 (two) times daily., Disp: , Rfl:     ferrous sulfate (FEOSOL) 325 mg (65 mg iron) Tab tablet, Take 325 mg by mouth 2 (two) times daily., Disp: , Rfl:     fluticasone propionate (FLONASE) 50 mcg/actuation nasal spray, 2 sprays by Each Nostril route Daily., Disp: , Rfl:     furosemide (LASIX) 20 MG tablet, TAKE 1 TABLET BY MOUTH EVERY DAY AS NEEDED, Disp: 90 tablet, Rfl: 3    gabapentin (NEURONTIN) 300 MG capsule, Take 300 mg by mouth., Disp: , Rfl:     levothyroxine (SYNTHROID) 50 MCG tablet, TAKE 1 TABLET BY MOUTH EVERY DAY, Disp: 90 tablet, Rfl: 1    metFORMIN (GLUCOPHAGE) 1000 MG tablet, Take 1 tablet (1,000 mg total) by mouth 2 (two) times daily., Disp: 180 tablet, Rfl: 1    omeprazole (PRILOSEC) 20 MG capsule, Take 40 mg by mouth every morning., Disp: , Rfl:     pantoprazole (PROTONIX) 40 MG tablet, TAKE 1 TABLET BY MOUTH EVERY DAY, Disp: 90 tablet, Rfl: 3    ramipriL (ALTACE) 2.5 MG capsule, Take 2.5 mg by mouth once daily., Disp: ,  Rfl:            Assessment:     IMPRESSION:  Elevated troponin likely type II secondary to UTI  Prolonged QTc  UTI  CAD/CABG x 3 (LIMA to LAD, SVG to OM, SVG to RCA) and PCI to RCA  HTN  HLD  DM II  Charcot-Abbie-Tooth disease  Muscular dystrophy  RITO/CPAP  RLS  No documented Hx of GI bleed    PLAN:     PLAN:  Continue to trend CE  If enzymes remain flat or downtrend okay to admit in [place]. If CE trend up, transfer to PCI capable facility  ABX per primary  Continue home medications  F/U with Dr. Devlin as outpatient.    Thank you for your consult.     Carlos Headley, [facility]-BC  Cardiology  [facility] - Emergency Dept  10/01/2024 10:00 AM

## 2024-10-01 NOTE — ED PROVIDER NOTES
Encounter Date: 10/1/2024       History     Chief Complaint   Patient presents with    Hematuria     Report blood in urine since last night with dysuria (burning), had 2 falls in past 2 days, Impact with buttocks, denies blood thinners      Patient is a 64-year-old white gentleman with a history of diabetes, Charcot Abbie tooth, hypertension, hyperlipidemia who presented to the ER today due to hematuria.  Patient states this started about a day ago.  He states he has had no issues voiding but does describe dysuria and polyuria.  He states that he has been having more falls than his baseline number of falls.  He states he does have CMT and he does have gait issues.  He states he has been more weak lately causing him to fall.  Denies any head trauma, neck pain, back pain, chest pain or abdominal pain.  He states he had some dizziness but it seems to fluctuate in intensity in his not present at this time.  He denies any fevers, chills cough, congestion, sore throat.      Review of patient's allergies indicates:   Allergen Reactions    Hydrocodone      Other reaction(s): hyperactive  can't sleep    Hydrocodone-acetaminophen      Past Medical History:   Diagnosis Date    Anemia, unspecified     CAD (coronary artery disease)     Charcot-Abbie-Tooth disease type 2     Chronic osteomyelitis of left ankle     Diabetes mellitus, type 2     Diabetic retinopathy     Family history of colon cancer in father     GERD (gastroesophageal reflux disease)     History of osteomyelitis     Hyperlipidemia     Hypertension     Hypothyroidism, unspecified     Muscular dystrophy     RITO (obstructive sleep apnea)     RITO on CPAP     Osteoarthritis of ankle and foot, right     Osteoarthritis of left knee     Personal history of colonic polyps 12/01/2022    s/p polypectomy - Dr Romel Erickson    Restless legs syndrome (RLS)      Past Surgical History:   Procedure Laterality Date    ANKLE FUSION Right 09/21/2020    Dr Maryjane Sánchez-Nilesh    ANKLE  FUSION Left 07/06/2020    Dr Maryjane Chow    ANKLE HARDWARE REMOVAL Left 09/21/2020    Dr Maryjane Chow    CARDIAC CATHETERIZATION Left 01/22/2019    Dr Alexander Garcia    COLONOSCOPY W/ BIOPSIES AND POLYPECTOMY  12/01/2022    Dr Romel Erickson    CORONARY ANGIOPLASTY WITH STENT PLACEMENT Left 02/17/2016    Dr Clarence De La Rosa    CORONARY ARTERY BYPASS GRAFT  2006    x3    CORONARY STENT PLACEMENT Left 02/17/2016    Dr Clarence De La Rosa    INCISION AND DRAINAGE  10/18/2010    INCISION AND DRAINAGE OF LOWER EXTREMITY Left 07/24/2014    Dr Maryjane Chow    INCISION AND DRAINAGE, BONE ABSCESS OR OSTEOMYELITIS, FOOT Bilateral 2007    TONSILLECTOMY  1962     Family History   Problem Relation Name Age of Onset    Stroke Mother      Diabetes Mellitus Mother      Colon cancer Father      Diabetes Mellitus Father       Social History     Tobacco Use    Smoking status: Former     Types: Cigarettes    Smokeless tobacco: Never   Substance Use Topics    Alcohol use: Not Currently    Drug use: Yes     Frequency: 4.0 times per week     Types: Marijuana     Review of Systems   Constitutional:  Negative for chills, fatigue and fever.   HENT:  Negative for congestion, sore throat and trouble swallowing.    Eyes:  Negative for pain and visual disturbance.   Respiratory:  Negative for cough, shortness of breath and wheezing.    Cardiovascular:  Negative for chest pain and palpitations.   Gastrointestinal:  Negative for abdominal pain, blood in stool, constipation, diarrhea, nausea and vomiting.   Genitourinary:  Positive for dysuria and hematuria.   Musculoskeletal:  Negative for back pain and myalgias.   Skin:  Negative for rash and wound.   Neurological:  Positive for dizziness. Negative for tremors, seizures, syncope, facial asymmetry, speech difficulty, weakness, light-headedness, numbness and headaches.   Psychiatric/Behavioral:  Negative for confusion. The patient is not nervous/anxious.        Physical Exam      Initial Vitals [10/01/24 0817]   BP Pulse Resp Temp SpO2   (!) 136/95 (!) 115 18 98.8 °F (37.1 °C) (!) 94 %      MAP       --         Physical Exam    Nursing note and vitals reviewed.  Constitutional: He appears well-developed and well-nourished. No distress.   HENT:   Head: Normocephalic and atraumatic.   Eyes: Conjunctivae and EOM are normal. Right eye exhibits no discharge. Left eye exhibits no discharge. No scleral icterus.   Neck: No tracheal deviation present.   Normal range of motion.  Cardiovascular:  Normal rate, regular rhythm and normal heart sounds.     Exam reveals no gallop and no friction rub.       No murmur heard.  Pulmonary/Chest: Breath sounds normal. No respiratory distress. He has no wheezes. He has no rhonchi. He has no rales.   Abdominal: Abdomen is soft. He exhibits no distension. There is no abdominal tenderness.   Negative for CVA tenderness bilaterally There is no rebound and no guarding.   Musculoskeletal:         General: No edema. Normal range of motion.      Cervical back: Normal range of motion.     Neurological: He is alert and oriented to person, place, and time. He has normal strength. No cranial nerve deficit or sensory deficit. GCS score is 15. GCS eye subscore is 4. GCS verbal subscore is 5. GCS motor subscore is 6.   CN II-XII intact bilaterally, PERRLA, EOMI, AO, no facial asymmetry noted, no abnormalities of vision loss or peripheral vision loss, strength 5/5 x 4 extremities, sensation intact throughout, no focal neurological deficits noted on exam.  Gait not assessed due to weakness.  Negative Pronator drift bilaterally.       Skin: Skin is warm and dry. No rash and no abscess noted. No erythema. No pallor.   Psychiatric: His behavior is normal. Judgment normal.         ED Course   Critical Care    Date/Time: 10/1/2024 9:13 AM    Performed by: Romel Gutiérrez MD  Authorized by: Romel Gutiérrez MD  Direct patient critical care time: 10 minutes  Additional history critical  care time: 10 minutes  Ordering / reviewing critical care time: 10 minutes  Documentation critical care time: 10 minutes  Consulting other physicians critical care time: 10 minutes  Total critical care time (exclusive of procedural time) : 50 minutes  Critical care was necessary to treat or prevent imminent or life-threatening deterioration of the following conditions: dehydration.  Critical care was time spent personally by me on the following activities: blood draw for specimens, development of treatment plan with patient or surrogate, discussions with consultants, evaluation of patient's response to treatment, examination of patient, obtaining history from patient or surrogate, ordering and performing treatments and interventions, ordering and review of laboratory studies, pulse oximetry, re-evaluation of patient's condition and review of old charts.        Labs Reviewed   URINALYSIS, REFLEX TO URINE CULTURE - Abnormal       Result Value    Color, UA Brown (*)     Appearance, UA Cloudy (*)     Specific Gravity, UA 1.025      pH, UA 6.0      Protein, UA 2+ (*)     Glucose, UA Negative      Ketones, UA Negative      Blood, UA 3+ (*)     Bilirubin, UA 1+ (*)     Urobilinogen, UA 1.0      Nitrites, UA Positive (*)     Leukocyte Esterase, UA 1+ (*)    COMPREHENSIVE METABOLIC PANEL - Abnormal    Sodium 138      Potassium 3.7      Chloride 101      CO2 25      Glucose 152 (*)     Blood Urea Nitrogen 24.0      Creatinine 1.19 (*)     Calcium 9.8      Protein Total 7.3      Albumin 3.4      Globulin 3.9 (*)     Albumin/Globulin Ratio 0.9 (*)     Bilirubin Total 1.3            ALT 49      AST 56 (*)     eGFR >60      Anion Gap 12.0      BUN/Creatinine Ratio 20     PROTIME-INR - Abnormal    PT 11.1 (*)     INR 1.1     CBC WITH DIFFERENTIAL - Abnormal    WBC 12.22 (*)     RBC 3.93 (*)     Hgb 11.3 (*)     Hct 34.9 (*)     MCV 88.8      MCH 28.8      MCHC 32.4 (*)     RDW 16.4      Platelet 111 (*)     MPV 10.8 (*)      Neut % 82.2      Lymph % 8.4      Mono % 8.2      Eos % 0.5      Basophil % 0.2      Lymph # 1.03      Neut # 10.04 (*)     Mono # 1.00      Eos # 0.06      Baso # 0.03      IG# 0.06 (*)     IG% 0.5      NRBC% 0.0     MAGNESIUM - Abnormal    Magnesium Level 1.40 (*)    TROPONIN I - Abnormal    Troponin-I 0.089 (*)    URINALYSIS, MICROSCOPIC - Abnormal    Bacteria, UA Moderate (*)     RBC, UA >100 (*)     WBC, UA 21-50 (*)     Squamous Epithelial Cells, UA Rare     CK - Abnormal    Creatine Kinase 250 (*)    TROPONIN I - Abnormal    Troponin-I 0.111 (*)    TROPONIN I - Abnormal    Troponin-I 0.101 (*)    APTT - Normal    PTT 28.1     LACTIC ACID, PLASMA - Normal    Lactic Acid Level 1.8     CULTURE, URINE   BLOOD CULTURE [facility]LG   BLOOD CULTURE [facility]   CBC W/ AUTO DIFFERENTIAL    Narrative:     The following orders were created for panel order CBC Auto Differential.  Procedure                               Abnormality         Status                     ---------                               -----------         ------                     CBC with Differential[[id number]]       Abnormal            Final result                 Please view results for these tests on the individual orders.     EKG Readings: (Independently Interpreted)   Initial Reading: No STEMI. Rhythm: Sinus Tachycardia. Heart Rate: 107. ST Segments: Normal ST Segments. T Waves: Normal. Axis: Normal. Other Findings: Prolonged QT Interval.     ECG Results              EKG 12-lead (Final result)        Collection Time Result Time QRS Duration OHS QTC Calculation    10/01/24 08:35:31 10/01/24 11:36:30 82 638                     Final result by Interface, [facility] (10/01/24 11:36:34)                   Narrative:    Test Reason : R00.0,    Vent. Rate : 107 BPM     Atrial Rate : 108 BPM     P-R Int : 000 ms          QRS Dur : 082 ms      QT Int : 478 ms       P-R-T Axes : 000 -10 068 degrees     QTc Int : 638 ms      Accelerated Junctional rhythm  Low  voltage QRS  Nonspecific T wave abnormality  Prolonged QT  Abnormal ECG  No previous ECGs available  Confirmed by Milo Wise MD ([id number]) on 10/1/2024 11:36:26 AM    Referred By: [email or web address]   SELF           Confirmed By:Milo Wise MD                                  Imaging Results              X-Ray Chest 1 View (Final result)  Result time 10/01/24 09:51:14      Final result by Misha Shearer MD (10/01/24 09:51:14)                   Impression:      No acute chest disease is identified.      Electronically signed by: Misha Shearer  Date:    10/01/2024  Time:    09:51               Narrative:    EXAMINATION:  XR CHEST 1 VIEW    CLINICAL HISTORY:  nstemi;, .    FINDINGS:  No alveolar consolidation, effusion, or pneumothorax is seen.   The thoracic aorta is normal  cardiac silhouette, central pulmonary vessels and mediastinum are normal in size and are grossly unremarkable.   visualized osseous structures are grossly unremarkable.                                       CT Head Without Contrast (Final result)  Result time 10/01/24 10:12:43      Final result by Dipak Galloway MD (10/01/24 10:12:43)                   Impression:      No acute intracranial process identified.      Electronically signed by: Dipak Galloway  Date:    10/01/2024  Time:    10:12               Narrative:    EXAMINATION:  CT HEAD WITHOUT CONTRAST    CLINICAL HISTORY:  Dizziness, persistent/recurrent, cardiac or vascular cause suspected;    TECHNIQUE:  CT images of the head without IV contrast. Axial, coronal and sagittal images reviewed. Dose length product 1120 mGycm. Automatic exposure control, adjustment of mA/kV or iterative reconstruction technique used to limit radiation dose.    COMPARISON:  No relevant comparison studies available at the time of dictation.    FINDINGS:  Extra-axial spaces/ventricular system: Normal for age.    Intracranial hemorrhage: None identified.    Cerebral parenchyma: No acute large vessel territory  infarct or mass effect identified. Mild chronic small vessel ischemic changes in the supratentorial white matter.    Vascular system: No hyperdense vessel appreciated.    Cerebellum: Normal.    Sella: Normal.    Included paranasal sinuses and mastoid air cells: Well-aerated.    Visualized orbits: Normal.    Scalp/Calvarium: No depressed skull fracture.                                       Medications   lactated ringers infusion ( Intravenous New Bag 10/1/24 1010)   0.9%  NaCl infusion (0 mLs Intravenous Stopped 10/1/24 1012)   magnesium sulfate 2g in water 50mL IVPB (premix) (0 g Intravenous Stopped 10/1/24 1110)   aspirin chewable tablet 162 mg (162 mg Oral Given 10/1/24 0920)   cefTRIAXone (Rocephin) 1 g in D5W 100 mL IVPB (MB+) (0 g Intravenous Stopped 10/1/24 1131)     Medical Decision Making  Differentials:  UTI, anemia, central cause of dizziness, peripheral dizziness, CVA/TIA, dehydration  Historian is the patient   64-year-old well-appearing male GCS 15 presents to the ER today due to hematuria.  Full neurological exam was done due to the dizziness with no deficits appreciated.  It was in his not present at this time being more consistent with a peripheral cause of dizziness.  CVA and TIA seem unlikely.  Patient appears clinically hypovolemic and thus fluids were started.  Tachycardia noted proximally 110.  Basic labs showed leukocytosis, nitrite positive UA and hypomagnesemia.  Mag sulfate ordered along with fluids.  Troponin positive.  Aspirin 162 given due to the underlying CKD seen on basic labs.  Suspect a type 2 NSTEMI due to UTI and dehydration.  Consulting Cardiology for further recommendations.  Since trop increased from 0.08 to 0.11 [facility] recd transfer for cardiology [facility] facility.  Rocephin started after Bcx drawn.  Pt accepted to [facility].     Amount and/or Complexity of Data Reviewed  Labs: ordered. Decision-making details documented in ED Course.  Radiology: ordered.  ECG/medicine  tests: ordered and independent interpretation performed. Decision-making details documented in ED Course.    Risk  OTC drugs.  Prescription drug management.  Decision regarding hospitalization.                                      Clinical Impression:  Final diagnoses:  [R00.0] Tachycardia  [N30.01] Acute cystitis with hematuria (Primary)  [E86.0] Dehydration  [I21.4] NSTEMI (non-ST elevated myocardial infarction)  [E83.42] Hypomagnesemia          ED Disposition Condition    Transfer to Another Facility Stable                Romel Gutiérrez MD  10/01/24 1048       Romel Gutiérrez MD  10/01/24 2371

## 2024-10-03 LAB — BACTERIA UR CULT: ABNORMAL

## 2024-10-06 LAB
BACTERIA BLD CULT: NORMAL
BACTERIA BLD CULT: NORMAL

## 2024-10-11 ENCOUNTER — LAB VISIT (OUTPATIENT)
Dept: LAB | Facility: HOSPITAL | Age: 64
End: 2024-10-11
Payer: MEDICARE

## 2024-10-11 ENCOUNTER — OFFICE VISIT (OUTPATIENT)
Dept: FAMILY MEDICINE | Facility: CLINIC | Age: 64
End: 2024-10-11
Payer: MEDICARE

## 2024-10-11 VITALS
WEIGHT: 230 LBS | TEMPERATURE: 98 F | SYSTOLIC BLOOD PRESSURE: 105 MMHG | HEART RATE: 68 BPM | HEIGHT: 67 IN | OXYGEN SATURATION: 100 % | DIASTOLIC BLOOD PRESSURE: 63 MMHG | RESPIRATION RATE: 20 BRPM | BODY MASS INDEX: 36.1 KG/M2

## 2024-10-11 DIAGNOSIS — E87.6 HYPOKALEMIA: ICD-10-CM

## 2024-10-11 DIAGNOSIS — I25.2 HISTORY OF NON-ST ELEVATION MYOCARDIAL INFARCTION (NSTEMI): ICD-10-CM

## 2024-10-11 DIAGNOSIS — R26.81 GAIT INSTABILITY: ICD-10-CM

## 2024-10-11 DIAGNOSIS — I25.10 CORONARY ARTERY DISEASE, UNSPECIFIED VESSEL OR LESION TYPE, UNSPECIFIED WHETHER ANGINA PRESENT, UNSPECIFIED WHETHER NATIVE OR TRANSPLANTED HEART: ICD-10-CM

## 2024-10-11 DIAGNOSIS — Z87.440 HISTORY OF UTI: ICD-10-CM

## 2024-10-11 DIAGNOSIS — Z09 HOSPITAL DISCHARGE FOLLOW-UP: Primary | ICD-10-CM

## 2024-10-11 LAB
ALBUMIN SERPL-MCNC: 3.4 G/DL (ref 3.4–4.8)
ALBUMIN/GLOB SERPL: 0.9 RATIO (ref 1.1–2)
ALP SERPL-CCNC: 203 UNIT/L (ref 40–150)
ALT SERPL-CCNC: 53 UNIT/L (ref 0–55)
ANION GAP SERPL CALC-SCNC: 11 MEQ/L
AST SERPL-CCNC: 46 UNIT/L (ref 5–34)
BILIRUB SERPL-MCNC: 0.4 MG/DL
BUN SERPL-MCNC: 22 MG/DL (ref 8.4–25.7)
CALCIUM SERPL-MCNC: 9.8 MG/DL (ref 8.8–10)
CHLORIDE SERPL-SCNC: 108 MMOL/L (ref 98–107)
CO2 SERPL-SCNC: 26 MMOL/L (ref 23–31)
CREAT SERPL-MCNC: 1.18 MG/DL (ref 0.73–1.18)
CREAT/UREA NIT SERPL: 19
GFR SERPLBLD CREATININE-BSD FMLA CKD-EPI: >60 ML/MIN/1.73/M2
GLOBULIN SER-MCNC: 3.8 GM/DL (ref 2.4–3.5)
GLUCOSE SERPL-MCNC: 117 MG/DL (ref 82–115)
POTASSIUM SERPL-SCNC: 4.6 MMOL/L (ref 3.5–5.1)
PROT SERPL-MCNC: 7.2 GM/DL (ref 5.8–7.6)
SODIUM SERPL-SCNC: 145 MMOL/L (ref 136–145)

## 2024-10-11 PROCEDURE — 3066F NEPHROPATHY DOC TX: CPT | Mod: CPTII,,,

## 2024-10-11 PROCEDURE — 36415 COLL VENOUS BLD VENIPUNCTURE: CPT

## 2024-10-11 PROCEDURE — 1101F PT FALLS ASSESS-DOCD LE1/YR: CPT | Mod: CPTII,,,

## 2024-10-11 PROCEDURE — 3078F DIAST BP <80 MM HG: CPT | Mod: CPTII,,,

## 2024-10-11 PROCEDURE — 3288F FALL RISK ASSESSMENT DOCD: CPT | Mod: CPTII,,,

## 2024-10-11 PROCEDURE — 80053 COMPREHEN METABOLIC PANEL: CPT

## 2024-10-11 PROCEDURE — 4010F ACE/ARB THERAPY RXD/TAKEN: CPT | Mod: CPTII,,,

## 2024-10-11 PROCEDURE — 99213 OFFICE O/P EST LOW 20 MIN: CPT | Mod: ,,,

## 2024-10-11 PROCEDURE — 3074F SYST BP LT 130 MM HG: CPT | Mod: CPTII,,,

## 2024-10-11 PROCEDURE — 1159F MED LIST DOCD IN RCRD: CPT | Mod: CPTII,,,

## 2024-10-11 PROCEDURE — 3044F HG A1C LEVEL LT 7.0%: CPT | Mod: CPTII,,,

## 2024-10-11 PROCEDURE — 3061F NEG MICROALBUMINURIA REV: CPT | Mod: CPTII,,,

## 2024-10-11 RX ORDER — CEFDINIR 300 MG/1
300 CAPSULE ORAL DAILY
COMMUNITY
Start: 2024-10-03 | End: 2024-10-13

## 2024-10-11 RX ORDER — PYRIDOXINE HCL (VITAMIN B6) 100 MG
50 TABLET ORAL DAILY
COMMUNITY

## 2024-10-15 ENCOUNTER — TELEPHONE (OUTPATIENT)
Dept: FAMILY MEDICINE | Facility: CLINIC | Age: 64
End: 2024-10-15
Payer: MEDICARE

## 2024-10-15 NOTE — ASSESSMENT & PLAN NOTE
Continue daily Aspirin + Atorvastatin 40 mg.   Stressed importance of adequate LDL, HA1C, and BP control.  Discussed appropriate lifestyle changes including smoking cessation, exercise, weight loss, and dietary modifications.  ED precautions reviewed including SOB, BARLOW, chest pain, dizziness, near syncope, palpitations, or jaw/back/neck discomfort.   Continue following up with Cardiology - Dr. Devlin.

## 2024-10-15 NOTE — TELEPHONE ENCOUNTER
----- Message from Pam Haji NP sent at 10/15/2024  1:42 PM CDT -----  Please inform patient of lab results.     1. Potassium is within normal ranges.     Thanks for all you do,   Pam

## 2024-10-18 PROCEDURE — G0180 MD CERTIFICATION HHA PATIENT: HCPCS | Mod: ,,,

## 2024-11-08 ENCOUNTER — LAB VISIT (OUTPATIENT)
Dept: LAB | Facility: HOSPITAL | Age: 64
End: 2024-11-08
Attending: INTERNAL MEDICINE
Payer: MEDICARE

## 2024-11-08 ENCOUNTER — OFFICE VISIT (OUTPATIENT)
Dept: HEMATOLOGY/ONCOLOGY | Facility: CLINIC | Age: 64
End: 2024-11-08
Payer: MEDICARE

## 2024-11-08 VITALS
DIASTOLIC BLOOD PRESSURE: 65 MMHG | HEART RATE: 84 BPM | TEMPERATURE: 98 F | OXYGEN SATURATION: 95 % | RESPIRATION RATE: 18 BRPM | WEIGHT: 227.81 LBS | SYSTOLIC BLOOD PRESSURE: 98 MMHG | BODY MASS INDEX: 35.75 KG/M2 | HEIGHT: 67 IN

## 2024-11-08 DIAGNOSIS — D50.9 IRON DEFICIENCY ANEMIA, UNSPECIFIED IRON DEFICIENCY ANEMIA TYPE: ICD-10-CM

## 2024-11-08 DIAGNOSIS — D64.9 ANEMIA, UNSPECIFIED TYPE: Primary | ICD-10-CM

## 2024-11-08 DIAGNOSIS — D51.8 OTHER VITAMIN B12 DEFICIENCY ANEMIA: ICD-10-CM

## 2024-11-08 LAB
BASOPHILS # BLD AUTO: 0.03 X10(3)/MCL
BASOPHILS NFR BLD AUTO: 0.5 %
EOSINOPHIL # BLD AUTO: 0.37 X10(3)/MCL (ref 0–0.9)
EOSINOPHIL NFR BLD AUTO: 6.4 %
ERYTHROCYTE [DISTWIDTH] IN BLOOD BY AUTOMATED COUNT: 16.4 % (ref 11.5–17)
FERRITIN SERPL-MCNC: 173.05 NG/ML (ref 21.81–274.66)
HCT VFR BLD AUTO: 34 % (ref 42–52)
HGB BLD-MCNC: 10.9 G/DL (ref 14–18)
IMM GRANULOCYTES # BLD AUTO: 0.01 X10(3)/MCL (ref 0–0.04)
IMM GRANULOCYTES NFR BLD AUTO: 0.2 %
IRON SATN MFR SERPL: 20 % (ref 20–50)
IRON SERPL-MCNC: 57 UG/DL (ref 65–175)
LYMPHOCYTES # BLD AUTO: 1.49 X10(3)/MCL (ref 0.6–4.6)
LYMPHOCYTES NFR BLD AUTO: 25.9 %
MCH RBC QN AUTO: 29.8 PG (ref 27–31)
MCHC RBC AUTO-ENTMCNC: 32.1 G/DL (ref 33–36)
MCV RBC AUTO: 92.9 FL (ref 80–94)
MONOCYTES # BLD AUTO: 0.56 X10(3)/MCL (ref 0.1–1.3)
MONOCYTES NFR BLD AUTO: 9.7 %
NEUTROPHILS # BLD AUTO: 3.3 X10(3)/MCL (ref 2.1–9.2)
NEUTROPHILS NFR BLD AUTO: 57.3 %
PLATELET # BLD AUTO: 126 X10(3)/MCL (ref 130–400)
PMV BLD AUTO: 10.2 FL (ref 7.4–10.4)
RBC # BLD AUTO: 3.66 X10(6)/MCL (ref 4.7–6.1)
TIBC SERPL-MCNC: 226 UG/DL (ref 69–240)
TIBC SERPL-MCNC: 283 UG/DL (ref 250–450)
TRANSFERRIN SERPL-MCNC: 258 MG/DL (ref 163–344)
VIT B12 SERPL-MCNC: 526 PG/ML (ref 213–816)
WBC # BLD AUTO: 5.76 X10(3)/MCL (ref 4.5–11.5)

## 2024-11-08 PROCEDURE — 99999 PR PBB SHADOW E&M-EST. PATIENT-LVL IV: CPT | Mod: PBBFAC,,, | Performed by: NURSE PRACTITIONER

## 2024-11-08 PROCEDURE — 85025 COMPLETE CBC W/AUTO DIFF WBC: CPT

## 2024-11-08 PROCEDURE — 36415 COLL VENOUS BLD VENIPUNCTURE: CPT

## 2024-11-08 PROCEDURE — 82728 ASSAY OF FERRITIN: CPT

## 2024-11-08 PROCEDURE — 82607 VITAMIN B-12: CPT

## 2024-11-08 PROCEDURE — 83540 ASSAY OF IRON: CPT

## 2024-11-08 NOTE — PROGRESS NOTES
"Subjective:       Patient ID: Aba Dunbar is a 64 y.o. male.    Chief Complaint: "I was in the hospital"    Diagnosis:  Iron deficiency anemia  Vitamin B12 deficiency     Current Treatment:  Ferrous sulfate 325 mg b.i.d. (started 2/16/23); Monthly B12     Clinical History:  Patient was referred by his PCP for evaluation of anemia on routine laboratory.  CBC from 2/3/23 showed a hemoglobin of 8.8 and hematocrit 31.2 with an MCV of 79.8 and RDW 19.9.  WBC and platelet counts were normal.  Reticulocyte count was 1.6%.  Additional laboratory drawn 2/7/23 confirmed iron deficiency with a serum iron of 38, TIBC 348, saturation 10% and ferritin level 22 ng/mL.  Review of previous laboratory, shows longstanding anemia dating back at least 2 years.  He had symptoms of PICA for ice.  He denied any abdominal symptoms or complaints.  No weight loss.  No change in his bowel habits, no melena or hematochezia.  Screening colonoscopy approximately 6 months prior- small polyps removed.      He was seen as a new patient 2/15/23.  Treatment was recommended with ferrous sulfate 325 mg b.i.d. His anemia improved on treatment his PICA symptoms resolved.    EGD 5/18/23 showed mild gastritis and patchy erythema with ulceration about the lesser curvature of the stomach.  He was placed on Protonix.  Pathology showed no H pylori or evidence of malignancy.    Interval History  Mr. Dunbar presents today by himself for a 3 month follow-up visit.  At the time of his last visit, he was found to have mild thrombocytopenia.  Antiplatelet antibodies were negative.  He is on chronic antibiotic suppression with Cipro and Doxy for osteomyelitis of the left ankle.   He continues on oral iron twice daily with good tolerance and reports compliance.  He was taken off of B12 since his last visit.  He was hospitalized 10/1-10/3/24 for chest pain.  Cardiology workup was negative.  Laboratory showed evidence of a UTI for which he was treated.  He has " "since undergone an outpatient stress test.  Laboratory drawn today shows mild progression of his anemia with a hemoglobin of 10.9.  Platelet count has improved to 126,000.  Iron studies, B12 and folate levels are pending.  He has no significant fatigue, weakness, or shortness of breath associated with his anemia.  He denies melena or hematochezia.    Review of Systems   Constitutional:  Negative for appetite change, fatigue, fever and unexpected weight change.   HENT:  Negative for mouth sores, sore throat and trouble swallowing.    Eyes: Negative.  Negative for visual disturbance.   Respiratory:  Negative for cough and shortness of breath.    Cardiovascular:  Negative for chest pain, palpitations and leg swelling.   Gastrointestinal:  Negative for abdominal distention, abdominal pain, blood in stool, constipation, diarrhea and nausea.   Genitourinary:  Negative for dysuria, frequency and urgency.   Musculoskeletal:  Positive for arthralgias and back pain (Chronic, lower back).   Integumentary:  Negative for pallor and rash.   Neurological:  Negative for dizziness, weakness, numbness and headaches.   Hematological:  Negative for adenopathy. Does not bruise/bleed easily.   Psychiatric/Behavioral: Negative.  The patient is not nervous/anxious.          PMHx:  HTN, hyperlipidemia, CAD, DM, hypothyroid, arthritis, Charcot-Abbie-Tooth disease, GERD, RLS  PSHx:  Tonsils, bilateral ankle fusions, CABG  SH:  Former smoker 1 ppd, quit age 46.  Occasional alcohol use.  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  FH:  His father had colon cancer.    Objective:        BP 98/65 (Patient Position: Sitting)   Pulse 84   Temp 98 °F (36.7 °C)   Resp 18   Ht 5' 7" (1.702 m)   Wt 103.3 kg (227 lb 12.8 oz)   SpO2 95%   BMI 35.68 kg/m²    Physical Exam  Constitutional:       Comments: Morbidly obese white male in NAD, ambulatory with a rolling walker   HENT:      Head: Normocephalic and atraumatic.      Mouth/Throat:     "  Mouth: Mucous membranes are moist.      Pharynx: Oropharynx is clear. No posterior oropharyngeal erythema.   Eyes:      Extraocular Movements: Extraocular movements intact.      Conjunctiva/sclera: Conjunctivae normal.      Pupils: Pupils are equal, round, and reactive to light.   Cardiovascular:      Rate and Rhythm: Normal rate and regular rhythm.      Heart sounds: No murmur heard.     Comments: Well-healed sternotomy incision  Pulmonary:      Effort: Pulmonary effort is normal.      Breath sounds: Normal breath sounds.   Abdominal:      General: Bowel sounds are normal. There is no distension.      Palpations: Abdomen is soft. There is no mass.      Tenderness: There is no abdominal tenderness.      Comments: Obese.   Musculoskeletal:         General: No swelling or tenderness. Normal range of motion.      Cervical back: Neck supple. No tenderness.   Lymphadenopathy:      Cervical: No cervical adenopathy.   Skin:     General: Skin is warm and dry.      Findings: No rash.   Neurological:      General: No focal deficit present.      Mental Status: He is alert and oriented to person, place, and time.      Cranial Nerves: No cranial nerve deficit.      Motor: No weakness.            LABORATORY  Recent Results (from the past week)   Iron and TIBC    Collection Time: 11/08/24 10:30 AM   Result Value Ref Range    Iron Binding Capacity Unsaturated 226 69 - 240 ug/dL    Iron Level 57 (L) 65 - 175 ug/dL    Transferrin 258 163 - 344 mg/dL    Iron Binding Capacity Total 283 250 - 450 ug/dL    Iron Saturation 20 20 - 50 %   CBC with Differential    Collection Time: 11/08/24 10:30 AM   Result Value Ref Range    WBC 5.76 4.50 - 11.50 x10(3)/mcL    RBC 3.66 (L) 4.70 - 6.10 x10(6)/mcL    Hgb 10.9 (L) 14.0 - 18.0 g/dL    Hct 34.0 (L) 42.0 - 52.0 %    MCV 92.9 80.0 - 94.0 fL    MCH 29.8 27.0 - 31.0 pg    MCHC 32.1 (L) 33.0 - 36.0 g/dL    RDW 16.4 11.5 - 17.0 %    Platelet 126 (L) 130 - 400 x10(3)/mcL    MPV 10.2 7.4 - 10.4 fL     Neut % 57.3 %    Lymph % 25.9 %    Mono % 9.7 %    Eos % 6.4 %    Basophil % 0.5 %    Lymph # 1.49 0.6 - 4.6 x10(3)/mcL    Neut # 3.30 2.1 - 9.2 x10(3)/mcL    Mono # 0.56 0.1 - 1.3 x10(3)/mcL    Eos # 0.37 0 - 0.9 x10(3)/mcL    Baso # 0.03 <=0.2 x10(3)/mcL    IG# 0.01 0 - 0.04 x10(3)/mcL    IG% 0.2 %         Assessment:   Iron deficiency anemia - Improved  Vitamin B12 deficiency - resolved  Thrombocytopenia    Plan:   Mr. Dunbar has minor progression of his anemia without associated symptoms  Iron studies, B12 and folate levels are pending.  Continue oral iron in the interim.  He has many co-morbid conditions that are contributing to his anemia, making it more multifactorial.  Discussed above at length.  Will monitor thrombocytopenia.  RTC 4-6 months for follow-up with CBC, B12, folate, and iron studies.  All questions answered.    RADHA PENALOZA, FNP-C  [facility] [place]       Other Physicians  Dr. Ventura Erickson

## 2024-11-13 ENCOUNTER — OFFICE VISIT (OUTPATIENT)
Dept: FAMILY MEDICINE | Facility: CLINIC | Age: 64
End: 2024-11-13
Payer: MEDICARE

## 2024-11-13 ENCOUNTER — HOSPITAL ENCOUNTER (OUTPATIENT)
Dept: RADIOLOGY | Facility: HOSPITAL | Age: 64
Discharge: HOME | End: 2024-11-13
Attending: FAMILY MEDICINE
Payer: MEDICARE

## 2024-11-13 VITALS
SYSTOLIC BLOOD PRESSURE: 104 MMHG | RESPIRATION RATE: 18 BRPM | TEMPERATURE: 97 F | HEIGHT: 67 IN | BODY MASS INDEX: 35.47 KG/M2 | DIASTOLIC BLOOD PRESSURE: 62 MMHG | HEART RATE: 80 BPM | OXYGEN SATURATION: 100 % | WEIGHT: 226 LBS

## 2024-11-13 DIAGNOSIS — G60.0 CHARCOT-MARIE-TOOTH DISEASE TYPE 2: ICD-10-CM

## 2024-11-13 DIAGNOSIS — E03.9 HYPOTHYROIDISM, UNSPECIFIED TYPE: ICD-10-CM

## 2024-11-13 DIAGNOSIS — E11.9 TYPE 2 DIABETES MELLITUS WITHOUT COMPLICATION, WITHOUT LONG-TERM CURRENT USE OF INSULIN: Primary | ICD-10-CM

## 2024-11-13 DIAGNOSIS — G89.29 CHRONIC LEFT SHOULDER PAIN: ICD-10-CM

## 2024-11-13 DIAGNOSIS — Z12.5 SCREENING PSA (PROSTATE SPECIFIC ANTIGEN): ICD-10-CM

## 2024-11-13 DIAGNOSIS — M25.512 CHRONIC LEFT SHOULDER PAIN: ICD-10-CM

## 2024-11-13 DIAGNOSIS — I10 PRIMARY HYPERTENSION: Chronic | ICD-10-CM

## 2024-11-13 PROBLEM — Z00.00 WELLNESS EXAMINATION: Status: ACTIVE | Noted: 2024-11-13

## 2024-11-13 PROCEDURE — 73030 X-RAY EXAM OF SHOULDER: CPT | Mod: TC,LT

## 2024-11-13 RX ORDER — GABAPENTIN 100 MG/1
100 CAPSULE ORAL NIGHTLY
COMMUNITY
Start: 2024-11-08

## 2024-11-13 NOTE — PROGRESS NOTES
"Subjective:      Patient ID: Aba Dunbar is a 64 y.o. male.    Chief Complaint: Establish Care (Previous PCP Dr Parsons, last Medicare Wellness 11/22/23)      Establish care    Diabetes  - tolerating medication (no side-effects)  - DXT: 140's  - watching diet    Hypertension  - tolerating medication (no side effects)  - no headaches  - patient without any complaints      Review of Systems   Constitutional: Negative.    Respiratory: Negative.     Cardiovascular: Negative.    Gastrointestinal: Negative.    Musculoskeletal:         Patient reports right footdrop with CMT   Psychiatric/Behavioral: Negative.           Objective:     /62   Pulse 80   Temp 97 °F (36.1 °C) (Temporal)   Resp 18   Ht 5' 6.93" (1.7 m)   Wt 102.5 kg (226 lb)   SpO2 100%   BMI 35.47 kg/m²    Physical Exam  Constitutional:       Appearance: Normal appearance.   Cardiovascular:      Rate and Rhythm: Normal rate and regular rhythm.      Heart sounds: Normal heart sounds.   Pulmonary:      Effort: Pulmonary effort is normal.      Breath sounds: Normal breath sounds.   Musculoskeletal:      Comments: Left shoulder: Limited range of motion due to pain, positive crepitation   Neurological:      Mental Status: He is alert.   Psychiatric:         Mood and Affect: Mood normal.         Behavior: Behavior normal.         Thought Content: Thought content normal.         Judgment: Judgment normal.       Recent Results (from the past 12 weeks)   Comprehensive Metabolic Panel    Collection Time: 10/01/24  8:35 AM   Result Value Ref Range    Sodium 138 136 - 145 mmol/L    Potassium 3.7 3.5 - 5.1 mmol/L    Chloride 101 98 - 107 mmol/L    CO2 25 23 - 31 mmol/L    Glucose 152 (H) 82 - 115 mg/dL    Blood Urea Nitrogen 24.0 8.4 - 25.7 mg/dL    Creatinine 1.19 (H) 0.73 - 1.18 mg/dL    Calcium 9.8 8.8 - 10.0 mg/dL    Protein Total 7.3 5.8 - 7.6 gm/dL    Albumin 3.4 3.4 - 4.8 g/dL    Globulin 3.9 (H) 2.4 - 3.5 gm/dL    Albumin/Globulin Ratio 0.9 (L) " 1.1 - 2.0 ratio    Bilirubin Total 1.3 <=1.5 mg/dL     40 - 150 unit/L    ALT 49 0 - 55 unit/L    AST 56 (H) 5 - 34 unit/L    eGFR >60 mL/min/1.73/m2    Anion Gap 12.0 mEq/L    BUN/Creatinine Ratio 20    CBC with Differential    Collection Time: 10/01/24  8:35 AM   Result Value Ref Range    WBC 12.22 (H) 4.50 - 11.50 x10(3)/mcL    RBC 3.93 (L) 4.70 - 6.10 x10(6)/mcL    Hgb 11.3 (L) 14.0 - 18.0 g/dL    Hct 34.9 (L) 42.0 - 52.0 %    MCV 88.8 80.0 - 94.0 fL    MCH 28.8 27.0 - 31.0 pg    MCHC 32.4 (L) 33.0 - 36.0 g/dL    RDW 16.4 11.5 - 17.0 %    Platelet 111 (L) 130 - 400 x10(3)/mcL    MPV 10.8 (H) 7.4 - 10.4 fL    Neut % 82.2 %    Lymph % 8.4 %    Mono % 8.2 %    Eos % 0.5 %    Basophil % 0.2 %    Lymph # 1.03 0.6 - 4.6 x10(3)/mcL    Neut # 10.04 (H) 2.1 - 9.2 x10(3)/mcL    Mono # 1.00 0.1 - 1.3 x10(3)/mcL    Eos # 0.06 0 - 0.9 x10(3)/mcL    Baso # 0.03 <=0.2 x10(3)/mcL    IG# 0.06 (H) 0 - 0.04 x10(3)/mcL    IG% 0.5 %    NRBC% 0.0 %   Magnesium    Collection Time: 10/01/24  8:35 AM   Result Value Ref Range    Magnesium Level 1.40 (L) 1.60 - 2.60 mg/dL   Troponin I    Collection Time: 10/01/24  8:35 AM   Result Value Ref Range    Troponin-I 0.089 (H) 0.000 - 0.045 ng/mL   EKG 12-lead    Collection Time: 10/01/24  8:35 AM   Result Value Ref Range    QRS Duration 82 ms    [facility] QTC Calculation 638 ms   Urinalysis, Reflex to Urine Culture    Collection Time: 10/01/24  8:46 AM    Specimen: Urine   Result Value Ref Range    Color, UA Brown (A) Yellow, Light-Yellow, Dark Yellow, Marni, Straw    Appearance, UA Cloudy (A) Clear    Specific Gravity, UA 1.025 1.005 - 1.030    pH, UA 6.0 5.0 - 8.5    Protein, UA 2+ (A) Negative    Glucose, UA Negative Negative, Normal    Ketones, UA Negative Negative    Blood, UA 3+ (A) Negative    Bilirubin, UA 1+ (A) Negative    Urobilinogen, UA 1.0 0.2, 1.0, Normal    Nitrites, UA Positive (A) Negative    Leukocyte Esterase, UA 1+ (A) Negative   Urinalysis, Microscopic    Collection Time:  10/01/24  8:46 AM   Result Value Ref Range    Bacteria, UA Moderate (A) None Seen, Rare, Occasional /HPF    RBC, UA >100 (A) None Seen, 0-2, 3-5, 0-5 /HPF    WBC, UA 21-50 (A) None Seen, 0-2, 3-5, 0-5 /HPF    Squamous Epithelial Cells, UA Rare None Seen, Rare, Occasional, Occ /HPF   Urine culture    Collection Time: 10/01/24  8:46 AM    Specimen: Urine   Result Value Ref Range    Urine Culture 10,000 - 25,000 colonies/ml Escherichia coli (A)        Susceptibility    Escherichia coli -  (no method available)     Amox/K Clav 16 Intermediate      Ampicillin >=32 Resistant      Cefepime <=0.12 Sensitive      Ceftriaxone <=0.25 Sensitive      Cefuroxime 4 Sensitive      Ciprofloxacin >=4 Resistant      Gentamicin <=1 Sensitive      Levofloxacin >=8 Resistant      Meropenem <=0.25 Sensitive      Nitrofurantoin <=16 Sensitive      Piperacillin/Tazobactam 16 Sensitive      Trimethoprim/Sulfamethoxazole <=20 Sensitive      Tetracycline >=16 Resistant      Tobramycin >=16 Resistant    Protime-INR    Collection Time: 10/01/24  8:48 AM   Result Value Ref Range    PT 11.1 (H) 9.1 - 10.9 seconds    INR 1.1 <=1.3   APTT    Collection Time: 10/01/24  8:48 AM   Result Value Ref Range    PTT 28.1 21.4 - 28.3 seconds   Blood Culture    Collection Time: 10/01/24  9:45 AM    Specimen: Hand, Right; Blood   Result Value Ref Range    Blood Culture No Growth at 5 days    CK    Collection Time: 10/01/24 10:15 AM   Result Value Ref Range    Creatine Kinase 250 (H) 30 - 200 U/L   Blood Culture    Collection Time: 10/01/24 10:15 AM    Specimen: Antecubital, Right; Blood   Result Value Ref Range    Blood Culture No Growth at 5 days    Troponin I    Collection Time: 10/01/24 10:15 AM   Result Value Ref Range    Troponin-I 0.111 (H) 0.000 - 0.045 ng/mL   Lactic Acid, Plasma    Collection Time: 10/01/24 10:15 AM   Result Value Ref Range    Lactic Acid Level 1.8 0.5 - 2.2 mmol/L   Troponin I    Collection Time: 10/01/24  1:51 PM   Result Value Ref  Range    Troponin-I 0.101 (H) 0.000 - 0.045 ng/mL   MAGNESIUM    Collection Time: 10/02/24  4:09 AM   Result Value Ref Range    Magnesium Level 1.7 1.6 - 2.6 MG/DL   BASIC METABOLIC PANEL    Collection Time: 10/02/24  4:09 AM   Result Value Ref Range    Creatinine 1.12 0.57 - 1.25 mg/dL    Blood Urea Nitrogen 18 7 - 18 mg/dL    Sodium 138 136 - 145 mmol/L    Potassium 3.4 (L) 3.5 - 5.1 mmol/L    Chloride 103 100 - 109 mmol/L    Carbon Dioxide 31 22 - 33 mmol/L    Glucose Screen 130 (H) 70 - 100 mg/dL    Calcium 8.8 8.8 - 10.6 mg/dL    Anion Gap 4 (L) 8 - 16 mmol/L    eGFR African American 73 mL/min/1.73mSq   LIPID PANEL    Collection Time: 10/02/24  4:09 AM   Result Value Ref Range    Cholesterol 105 0 - 199 mg/dL    Triglycerides 82 0 - 149 mg/dL    HDL 49 >=40 mg/dL    LDL Calculated 40 0 - 129 mg/dL    Non-HDL 56 0 - 159 MG/DL   POCT GLUCOSE    Collection Time: 10/02/24 11:13 AM   Result Value Ref Range    POC Glucose 171 (H) 70 - 100 mg/dL   POCT GLUCOSE    Collection Time: 10/02/24  7:32 PM   Result Value Ref Range    POC Glucose 174 (H) 70 - 100 mg/dL   POCT GLUCOSE    Collection Time: 10/03/24 12:09 PM   Result Value Ref Range    POC Glucose 155 (H) 70 - 100 mg/dL   Comprehensive Metabolic Panel    Collection Time: 10/11/24  9:55 AM   Result Value Ref Range    Sodium 145 136 - 145 mmol/L    Potassium 4.6 3.5 - 5.1 mmol/L    Chloride 108 (H) 98 - 107 mmol/L    CO2 26 23 - 31 mmol/L    Glucose 117 (H) 82 - 115 mg/dL    Blood Urea Nitrogen 22.0 8.4 - 25.7 mg/dL    Creatinine 1.18 0.73 - 1.18 mg/dL    Calcium 9.8 8.8 - 10.0 mg/dL    Protein Total 7.2 5.8 - 7.6 gm/dL    Albumin 3.4 3.4 - 4.8 g/dL    Globulin 3.8 (H) 2.4 - 3.5 gm/dL    Albumin/Globulin Ratio 0.9 (L) 1.1 - 2.0 ratio    Bilirubin Total 0.4 <=1.5 mg/dL     (H) 40 - 150 unit/L    ALT 53 0 - 55 unit/L    AST 46 (H) 5 - 34 unit/L    eGFR >60 mL/min/1.73/m2    Anion Gap 11.0 mEq/L    BUN/Creatinine Ratio 19    Iron and TIBC    Collection Time:  11/08/24 10:30 AM   Result Value Ref Range    Iron Binding Capacity Unsaturated 226 69 - 240 ug/dL    Iron Level 57 (L) 65 - 175 ug/dL    Transferrin 258 163 - 344 mg/dL    Iron Binding Capacity Total 283 250 - 450 ug/dL    Iron Saturation 20 20 - 50 %   Ferritin    Collection Time: 11/08/24 10:30 AM   Result Value Ref Range    Ferritin Level 173.05 21.81 - 274.66 ng/mL   Vitamin B12    Collection Time: 11/08/24 10:30 AM   Result Value Ref Range    Vitamin B12 526 213 - 816 pg/mL   CBC with Differential    Collection Time: 11/08/24 10:30 AM   Result Value Ref Range    WBC 5.76 4.50 - 11.50 x10(3)/mcL    RBC 3.66 (L) 4.70 - 6.10 x10(6)/mcL    Hgb 10.9 (L) 14.0 - 18.0 g/dL    Hct 34.0 (L) 42.0 - 52.0 %    MCV 92.9 80.0 - 94.0 fL    MCH 29.8 27.0 - 31.0 pg    MCHC 32.1 (L) 33.0 - 36.0 g/dL    RDW 16.4 11.5 - 17.0 %    Platelet 126 (L) 130 - 400 x10(3)/mcL    MPV 10.2 7.4 - 10.4 fL    Neut % 57.3 %    Lymph % 25.9 %    Mono % 9.7 %    Eos % 6.4 %    Basophil % 0.5 %    Lymph # 1.49 0.6 - 4.6 x10(3)/mcL    Neut # 3.30 2.1 - 9.2 x10(3)/mcL    Mono # 0.56 0.1 - 1.3 x10(3)/mcL    Eos # 0.37 0 - 0.9 x10(3)/mcL    Baso # 0.03 <=0.2 x10(3)/mcL    IG# 0.01 0 - 0.04 x10(3)/mcL    IG% 0.2 %           Assessment:     Problem List Items Addressed This Visit          Neuro    Charcot-Abbie-Tooth disease type 2    Relevant Orders    Ambulatory referral/consult to Podiatry       Endocrine    Type 2 diabetes mellitus without complication, without long-term current use of insulin - Primary    Hypothyroidism     Other Visit Diagnoses       Chronic left shoulder pain        Relevant Orders    X-Ray Shoulder 2 or More Views Left    Screening PSA (prostate specific antigen)        Relevant Orders    PSA, Screening             Plan:   1. Type 2 diabetes mellitus without complication, without long-term current use of insulin  Controlled  Continue current Rx medication  Return to clinic with any concerns    2. Hypothyroidism, unspecified  type  Controlled  Continue current Rx medication  Return to clinic with any concerns    3. Charcot-Abbie-Tooth disease type 2  -     Ambulatory referral/consult to Podiatry; Future; Expected date: 11/20/2024  Refer patient to podiatry     4. Chronic left shoulder pain  -     X-Ray Shoulder 2 or More Views Left; Future; Expected date: 11/13/2024  Schedule left shoulder x-ray     5. Screening PSA (prostate specific antigen)  -     PSA, Screening; Future; Expected date: 11/13/2024  Check PSA

## 2024-11-13 NOTE — ASSESSMENT & PLAN NOTE
Lab work reviewed with patient  Continue current medication  Continue diet/exercise  Advanced directive discussed with patient  Return to clinic with any concerns    Advance Care Planning     Date: 11/13/2024    Living Will  During this visit, I engaged the patient  in the voluntary advance care planning process.  The patient and I reviewed the role for advance directives and their purpose in directing future healthcare if the patient's unable to speak for him/herself.  At this point in time, the patient does have full decision-making capacity.  We discussed different extreme health states that he could experience, and reviewed what kind of medical care he would want in those situations.  The patient communicated that if he were comatose and had little chance of a meaningful recovery, he would not want machines/life-sustaining treatments used.   I spent a total of 5 minutes engaging the patient in this advance care planning discussion.

## 2024-11-13 NOTE — PROGRESS NOTES
"Subjective:      Patient ID: Aba Dunbar is a 64 y.o. male.    Chief Complaint: Establish Care (Previous PCP Dr Parsons, last Medicare Wellness 11/22/23)      Establish care        Review of Systems   Constitutional: Negative.    Respiratory: Negative.     Cardiovascular: Negative.    Gastrointestinal: Negative.    Psychiatric/Behavioral: Negative.           Objective:     /62   Pulse 80   Temp 97 °F (36.1 °C) (Temporal)   Resp 18   Ht 5' 6.93" (1.7 m)   Wt 102.5 kg (226 lb)   SpO2 100%   BMI 35.47 kg/m²    Physical Exam  Constitutional:       Appearance: Normal appearance.   Cardiovascular:      Rate and Rhythm: Normal rate and regular rhythm.      Heart sounds: Normal heart sounds.   Pulmonary:      Effort: Pulmonary effort is normal.      Breath sounds: Normal breath sounds.   Neurological:      Mental Status: He is alert.   Psychiatric:         Mood and Affect: Mood normal.         Behavior: Behavior normal.         Thought Content: Thought content normal.         Judgment: Judgment normal.             Assessment:     Problem List Items Addressed This Visit    None       Plan:   {There are no diagnoses linked to this encounter. (Refresh or delete this SmartLink)}           "

## 2024-11-13 NOTE — PROGRESS NOTES
Patient ID: [id number]     Chief Complaint: Establish Care (Previous PCP Dr Parsons, last Medicare Wellness 11/22/23)      HPI:     Aba Dunbar is a 64 y.o. male here today for a Medicare Wellness.       Opioid Screening: Patient medication list reviewed, patient is not taking prescription opioids. Patient is not using additional opioids than prescribed. Patient is not at low risk of substance abuse based on this opioid use history.       -------------------------------------    Anemia, unspecified    B12 deficiency    CAD (coronary artery disease)    Charcot-Abbie-Tooth disease type 2    Chronic osteomyelitis of left ankle    Diabetes mellitus, type 2    Diabetic retinopathy    Family history of colon cancer in father    GERD (gastroesophageal reflux disease)    History of osteomyelitis    Hyperlipidemia    Hypertension    Hypothyroidism, unspecified    VENKAT (iron deficiency anemia)    Muscular dystrophy    RITO (obstructive sleep apnea)    RITO on CPAP    Osteoarthritis of ankle and foot, right    Osteoarthritis of left knee    Personal history of colonic polyps    s/p polypectomy - Dr Romel Erickson    Restless legs syndrome (RLS)        Past Surgical History:   Procedure Laterality Date    ANKLE FUSION Right 09/21/2020    Dr Maryjane Chow    ANKLE FUSION Left 07/06/2020    Dr Maryjane Chow    ANKLE HARDWARE REMOVAL Left 09/21/2020    Dr Maryjane Chow    CARDIAC CATHETERIZATION Left 01/22/2019    Dr Alexander Garcia    COLONOSCOPY W/ BIOPSIES AND POLYPECTOMY  12/01/2022    Dr Romel Erickson    CORONARY ANGIOPLASTY WITH STENT PLACEMENT Left 02/17/2016    Dr Clarence De La Rosa    CORONARY ARTERY BYPASS GRAFT  2006    x3    CORONARY STENT PLACEMENT Left 02/17/2016    Dr Clarence De La Rosa    INCISION AND DRAINAGE  10/18/2010    INCISION AND DRAINAGE OF LOWER EXTREMITY Left 07/24/2014    Dr Maryjane Chow    INCISION AND DRAINAGE, BONE ABSCESS OR OSTEOMYELITIS, FOOT Bilateral 2007    TONSILLECTOMY  1962        Review of patient's allergies indicates:   Allergen Reactions    Hydrocodone      Other reaction(s): hyperactive  can't sleep    Hydrocodone-acetaminophen        Outpatient Medications Marked as Taking for the 24 encounter (Office Visit) with Osvaldo Ann MD   Medication Sig Dispense Refill    acetaminophen (TYLENOL ARTHRITIS PAIN) 650 MG TbSR Take 1,300 mg by mouth daily as needed (Pain).      aspirin (ECOTRIN) 81 MG EC tablet Take 81 mg by mouth once daily.      atorvastatin (LIPITOR) 40 MG tablet TAKE 1 TABLET BY MOUTH EVERY DAY 90 tablet 1    carvediloL (COREG) 12.5 MG tablet Take 12.5 mg by mouth 2 (two) times daily.      ciprofloxacin HCl (CIPRO) 500 MG tablet Take 500 mg by mouth 2 (two) times daily.      doxycycline (VIBRA-TABS) 100 MG tablet Take 100 mg by mouth 2 (two) times daily.      ferrous sulfate (FEOSOL) 325 mg (65 mg iron) Tab tablet Take 325 mg by mouth 2 (two) times daily.      fluticasone propionate (FLONASE) 50 mcg/actuation nasal spray 2 sprays by Each Nostril route Daily.      furosemide (LASIX) 20 MG tablet TAKE 1 TABLET BY MOUTH EVERY DAY AS NEEDED 90 tablet 3    gabapentin (NEURONTIN) 100 MG capsule Take 100 mg by mouth every evening.      levothyroxine (SYNTHROID) 50 MCG tablet TAKE 1 TABLET BY MOUTH EVERY DAY 90 tablet 1    metFORMIN (GLUCOPHAGE) 1000 MG tablet Take 1 tablet (1,000 mg total) by mouth 2 (two) times daily. 180 tablet 1    pantoprazole (PROTONIX) 40 MG tablet TAKE 1 TABLET BY MOUTH EVERY DAY 90 tablet 3    pyridoxine, vitamin B6, (B-6) 100 MG Tab Take 50 mg by mouth once daily.      ramipriL (ALTACE) 2.5 MG capsule Take 2.5 mg by mouth once daily.         Social History     Socioeconomic History    Marital status:    Tobacco Use    Smoking status: Former     Current packs/day: 0.00     Types: Cigarettes     Quit date: 10/11/2016     Years since quittin.0    Smokeless tobacco: Never   Substance and Sexual Activity    Alcohol use: Not Currently     Drug use: Yes     Frequency: 4.0 times per week     Types: Marijuana    Sexual activity: Not Currently     Social Drivers of Health     Financial Resource Strain: Low Risk  (5/10/2023)    Overall Financial Resource Strain (CARDIA)     Difficulty of Paying Living Expenses: Not hard at all   Food Insecurity: No Food Insecurity (5/10/2023)    Hunger Vital Sign     Worried About Running Out of Food in the Last Year: Never true     Ran Out of Food in the Last Year: Never true   Transportation Needs: No Transportation Needs (5/10/2023)    PRAPARE - Transportation     Lack of Transportation (Medical): No     Lack of Transportation (Non-Medical): No   Physical Activity: Inactive (5/10/2023)    Exercise Vital Sign     Days of Exercise per Week: 0 days     Minutes of Exercise per Session: 0 min   Stress: No Stress Concern Present (5/10/2023)    [employer] - Occupational Stress Questionnaire     Feeling of Stress : Not at all   Housing Stability: Low Risk  (5/10/2023)    Housing Stability Vital Sign     Unable to Pay for Housing in the Last Year: No     Number of Places Lived in the Last Year: 1     Unstable Housing in the Last Year: No        Family History   Problem Relation Name Age of Onset    Stroke Mother      Diabetes Mellitus Mother      Colon cancer Father      Diabetes Mellitus Father          Patient Care Team:  Osvaldo Ann MD as PCP - General (Family Medicine)  Alexander Devlin MD as Consulting Physician (Cardiology)  Greg Bennett MD (Ophthalmology)  Cande Davenport MD as Consulting Physician (Infectious Diseases)  Mickey Kingsley Jr., MD as Consulting Physician (Pain Medicine)  Romel Erickson MD as Consulting Physician (Gastroenterology)  Noe Price MD as Consulting Physician (Oncology)  Jeniffer Espitia DPM as Consulting Physician (Podiatry)       Subjective:     Diabetes  - tolerating medication (no side-effects)  - DXT: 140's  - watching  "diet    Review of Systems   Constitutional: Negative.    Respiratory: Negative.     Cardiovascular: Negative.    Gastrointestinal: Negative.    Musculoskeletal:         Left shoulder pain: reports recent fall with outstretched hand   Psychiatric/Behavioral: Negative.           Patient Reported Health Risk Assessment       Objective:     /62   Pulse 80   Temp 97 °F (36.1 °C) (Temporal)   Resp 18   Ht 5' 6.93" (1.7 m)   Wt 102.5 kg (226 lb)   SpO2 100%   BMI 35.47 kg/m²     Physical Exam  Constitutional:       Appearance: Normal appearance.   Cardiovascular:      Rate and Rhythm: Normal rate and regular rhythm.   Pulmonary:      Effort: Pulmonary effort is normal.      Breath sounds: Normal breath sounds.   Abdominal:      General: Abdomen is flat. Bowel sounds are normal.      Palpations: Abdomen is soft.   Musculoskeletal:      Comments: Left shoulder: decreased ROM due to pain   Neurological:      Mental Status: He is alert.   Psychiatric:         Mood and Affect: Mood normal.         Behavior: Behavior normal.         Thought Content: Thought content normal.         Judgment: Judgment normal.                No data to display                  11/8/2024     9:30 AM 10/11/2024     9:20 AM 8/12/2024    11:00 AM 5/3/2024    11:00 AM 11/22/2023     8:30 AM 11/2/2023    10:30 AM 6/29/2023    10:40 AM   Fall Risk Assessment - Outpatient   Mobility Status Ambulatory w/ assistance Ambulatory w/ assistance Ambulatory w/ assistance Ambulatory w/ assistance Ambulatory w/ assistance Ambulatory w/ assistance Ambulatory   Number of falls 0 0 0 0 0 0 0   Identified as fall risk True True True True True True False              Assessment/Plan:     1. Type 2 diabetes mellitus without complication, without long-term current use of insulin    2. Primary hypertension    3. Hypothyroidism, unspecified type           Medicare Annual Wellness and Personalized Prevention Plan:   Fall Risk + Home Safety + Hearing Impairment + " Depression Screen + Opioid and Substance Abuse Screening + Cognitive Impairment Screen + Health Risk Assessment all reviewed.     Health Maintenance Topics with due status: Not Due       Topic Last Completion Date    Colorectal Cancer Screening 12/01/2022    Diabetes Urine Screening 05/03/2024    Lipid Panel 10/02/2024    High Dose Statin 11/13/2024    Aspirin/Antiplatelet Therapy 11/13/2024      The patient's Health Maintenance was reviewed and the following appears to be due at this time:   Health Maintenance Due   Topic Date Due    Foot Exam  Never done    TETANUS VACCINE  Never done    Shingles Vaccine (1 of 2) Never done    RSV Vaccine (Age 60+ and Pregnant patients) (1 - Risk 60-74 years 1-dose series) Never done    Abdominal Aortic Aneurysm Screening  Never done    COVID-19 Vaccine (4 - 2024-25 season) 09/01/2024    Hemoglobin A1c  12/03/2024    Eye Exam  01/23/2025       Advance Care Planning   I attest to discussing Advance Care Planning with patient and/or family member.  Education was provided including the importance of the Health Care Power of , Advance Directives, and/or [place] documentation.  The patient expressed understanding to the importance of this information and discussion.         No follow-ups on file. In addition to their scheduled follow up, the patient has also been instructed to follow up on as needed basis.

## 2024-11-14 ENCOUNTER — TELEPHONE (OUTPATIENT)
Dept: FAMILY MEDICINE | Facility: CLINIC | Age: 64
End: 2024-11-14
Payer: MEDICARE

## 2024-11-14 DIAGNOSIS — M19.012 OSTEOARTHRITIS OF LEFT SHOULDER, UNSPECIFIED OSTEOARTHRITIS TYPE: Primary | ICD-10-CM

## 2024-11-14 NOTE — TELEPHONE ENCOUNTER
----- Message from Osvaldo Ann MD sent at 11/14/2024  1:56 PM CST -----  Please inform patient of results.     1. Degenerative changes.  Please refer patient to Dr. Xiao

## 2024-11-19 ENCOUNTER — EXTERNAL HOME HEALTH (OUTPATIENT)
Dept: HOME HEALTH SERVICES | Facility: HOSPITAL | Age: 64
End: 2024-11-19
Payer: MEDICARE

## 2024-11-27 DIAGNOSIS — I10 HYPERTENSION, UNSPECIFIED TYPE: ICD-10-CM

## 2024-11-29 ENCOUNTER — LAB VISIT (OUTPATIENT)
Dept: LAB | Facility: HOSPITAL | Age: 64
End: 2024-11-29
Attending: INTERNAL MEDICINE
Payer: MEDICARE

## 2024-11-29 DIAGNOSIS — M86.372 CHRONIC MULTIFOCAL OSTEOMYELITIS, LEFT ANKLE AND FOOT: Primary | ICD-10-CM

## 2024-11-29 LAB
ALBUMIN SERPL-MCNC: 3.7 G/DL (ref 3.4–4.8)
ALBUMIN/GLOB SERPL: 1.1 RATIO (ref 1.1–2)
ALP SERPL-CCNC: 128 UNIT/L (ref 40–150)
ALT SERPL-CCNC: 31 UNIT/L (ref 0–55)
ANION GAP SERPL CALC-SCNC: 11 MEQ/L
AST SERPL-CCNC: 40 UNIT/L (ref 5–34)
BASOPHILS # BLD AUTO: 0.04 X10(3)/MCL
BASOPHILS NFR BLD AUTO: 0.8 %
BILIRUB SERPL-MCNC: 0.5 MG/DL
BUN SERPL-MCNC: 31 MG/DL (ref 8.4–25.7)
CALCIUM SERPL-MCNC: 10.2 MG/DL (ref 8.8–10)
CHLORIDE SERPL-SCNC: 108 MMOL/L (ref 98–107)
CO2 SERPL-SCNC: 23 MMOL/L (ref 23–31)
CREAT SERPL-MCNC: 1.44 MG/DL (ref 0.72–1.25)
CREAT/UREA NIT SERPL: 22
CRP SERPL-MCNC: 6.9 MG/L
EOSINOPHIL # BLD AUTO: 0.36 X10(3)/MCL (ref 0–0.9)
EOSINOPHIL NFR BLD AUTO: 7.3 %
ERYTHROCYTE [DISTWIDTH] IN BLOOD BY AUTOMATED COUNT: 15.9 % (ref 11.5–17)
ERYTHROCYTE [SEDIMENTATION RATE] IN BLOOD: 57 MM/HR (ref 0–15)
GFR SERPLBLD CREATININE-BSD FMLA CKD-EPI: 54 ML/MIN/1.73/M2
GLOBULIN SER-MCNC: 3.5 GM/DL (ref 2.4–3.5)
GLUCOSE SERPL-MCNC: 101 MG/DL (ref 82–115)
HCT VFR BLD AUTO: 34.6 % (ref 42–52)
HGB BLD-MCNC: 10.9 G/DL (ref 14–18)
IMM GRANULOCYTES # BLD AUTO: 0.02 X10(3)/MCL (ref 0–0.04)
IMM GRANULOCYTES NFR BLD AUTO: 0.4 %
LYMPHOCYTES # BLD AUTO: 1.05 X10(3)/MCL (ref 0.6–4.6)
LYMPHOCYTES NFR BLD AUTO: 21.4 %
MCH RBC QN AUTO: 29.1 PG (ref 27–31)
MCHC RBC AUTO-ENTMCNC: 31.5 G/DL (ref 33–36)
MCV RBC AUTO: 92.3 FL (ref 80–94)
MONOCYTES # BLD AUTO: 0.39 X10(3)/MCL (ref 0.1–1.3)
MONOCYTES NFR BLD AUTO: 8 %
NEUTROPHILS # BLD AUTO: 3.04 X10(3)/MCL (ref 2.1–9.2)
NEUTROPHILS NFR BLD AUTO: 62.1 %
NRBC BLD AUTO-RTO: 0 %
PLATELET # BLD AUTO: 102 X10(3)/MCL (ref 130–400)
PMV BLD AUTO: 10.4 FL (ref 7.4–10.4)
POTASSIUM SERPL-SCNC: 4.6 MMOL/L (ref 3.5–5.1)
PROT SERPL-MCNC: 7.2 GM/DL (ref 5.8–7.6)
RBC # BLD AUTO: 3.75 X10(6)/MCL (ref 4.7–6.1)
SODIUM SERPL-SCNC: 142 MMOL/L (ref 136–145)
WBC # BLD AUTO: 4.9 X10(3)/MCL (ref 4.5–11.5)

## 2024-11-29 PROCEDURE — 86140 C-REACTIVE PROTEIN: CPT

## 2024-11-29 PROCEDURE — 80053 COMPREHEN METABOLIC PANEL: CPT

## 2024-11-29 PROCEDURE — 85025 COMPLETE CBC W/AUTO DIFF WBC: CPT

## 2024-11-29 PROCEDURE — 36415 COLL VENOUS BLD VENIPUNCTURE: CPT

## 2024-11-29 PROCEDURE — 85652 RBC SED RATE AUTOMATED: CPT

## 2024-12-02 RX ORDER — FUROSEMIDE 20 MG/1
20 TABLET ORAL DAILY PRN
Qty: 90 TABLET | Refills: 3 | Status: SHIPPED | OUTPATIENT
Start: 2024-12-02

## 2024-12-10 ENCOUNTER — HOSPITAL ENCOUNTER (OUTPATIENT)
Dept: RADIOLOGY | Facility: HOSPITAL | Age: 64
Discharge: HOME | End: 2024-12-10
Attending: INTERNAL MEDICINE
Payer: MEDICARE

## 2024-12-10 DIAGNOSIS — I65.23 BILATERAL CAROTID ARTERY STENOSIS: ICD-10-CM

## 2024-12-10 PROCEDURE — 93880 EXTRACRANIAL BILAT STUDY: CPT | Mod: TC

## 2025-02-07 DIAGNOSIS — E03.9 HYPOTHYROIDISM, UNSPECIFIED TYPE: ICD-10-CM

## 2025-02-07 RX ORDER — LEVOTHYROXINE SODIUM 50 UG/1
TABLET ORAL
Qty: 90 TABLET | Refills: 1 | Status: SHIPPED | OUTPATIENT
Start: 2025-02-07

## 2025-02-11 ENCOUNTER — OFFICE VISIT (OUTPATIENT)
Dept: FAMILY MEDICINE | Facility: CLINIC | Age: 65
End: 2025-02-11
Payer: MEDICARE

## 2025-02-11 VITALS
TEMPERATURE: 97 F | RESPIRATION RATE: 18 BRPM | DIASTOLIC BLOOD PRESSURE: 84 MMHG | HEART RATE: 94 BPM | OXYGEN SATURATION: 100 % | SYSTOLIC BLOOD PRESSURE: 120 MMHG | BODY MASS INDEX: 34.06 KG/M2 | HEIGHT: 67 IN | WEIGHT: 217 LBS

## 2025-02-11 DIAGNOSIS — M51.9 LUMBAR DISC DISEASE: Primary | ICD-10-CM

## 2025-02-11 DIAGNOSIS — I50.9 HEART FAILURE, UNSPECIFIED HF CHRONICITY, UNSPECIFIED HEART FAILURE TYPE: ICD-10-CM

## 2025-02-11 DIAGNOSIS — G71.00 MUSCULAR DYSTROPHY: ICD-10-CM

## 2025-02-11 DIAGNOSIS — E66.812 CLASS 2 SEVERE OBESITY DUE TO EXCESS CALORIES WITH SERIOUS COMORBIDITY AND BODY MASS INDEX (BMI) OF 39.0 TO 39.9 IN ADULT: ICD-10-CM

## 2025-02-11 DIAGNOSIS — M50.90 CERVICAL DISC DISEASE: ICD-10-CM

## 2025-02-11 DIAGNOSIS — M86.679 CHRONIC OSTEOMYELITIS INVOLVING ANKLE AND FOOT, UNSPECIFIED LATERALITY: ICD-10-CM

## 2025-02-11 DIAGNOSIS — E11.42 TYPE 2 DIABETES MELLITUS WITH DIABETIC POLYNEUROPATHY, WITHOUT LONG-TERM CURRENT USE OF INSULIN: ICD-10-CM

## 2025-02-11 DIAGNOSIS — E66.01 CLASS 2 SEVERE OBESITY DUE TO EXCESS CALORIES WITH SERIOUS COMORBIDITY AND BODY MASS INDEX (BMI) OF 39.0 TO 39.9 IN ADULT: ICD-10-CM

## 2025-02-11 DIAGNOSIS — L97.512 NON-PRESSURE CHRONIC ULCER OF OTHER PART OF RIGHT FOOT WITH FAT LAYER EXPOSED: ICD-10-CM

## 2025-02-11 DIAGNOSIS — N18.31 CHRONIC KIDNEY DISEASE, STAGE 3A: ICD-10-CM

## 2025-02-11 LAB — HBA1C MFR BLD: 6.1 %

## 2025-02-11 PROCEDURE — 99214 OFFICE O/P EST MOD 30 MIN: CPT | Mod: ,,, | Performed by: FAMILY MEDICINE

## 2025-02-11 PROCEDURE — 1160F RVW MEDS BY RX/DR IN RCRD: CPT | Mod: CPTII,,, | Performed by: FAMILY MEDICINE

## 2025-02-11 PROCEDURE — 3044F HG A1C LEVEL LT 7.0%: CPT | Mod: CPTII,,, | Performed by: FAMILY MEDICINE

## 2025-02-11 PROCEDURE — 3079F DIAST BP 80-89 MM HG: CPT | Mod: CPTII,,, | Performed by: FAMILY MEDICINE

## 2025-02-11 PROCEDURE — 3074F SYST BP LT 130 MM HG: CPT | Mod: CPTII,,, | Performed by: FAMILY MEDICINE

## 2025-02-11 PROCEDURE — 4010F ACE/ARB THERAPY RXD/TAKEN: CPT | Mod: CPTII,,, | Performed by: FAMILY MEDICINE

## 2025-02-11 PROCEDURE — 1125F AMNT PAIN NOTED PAIN PRSNT: CPT | Mod: CPTII,,, | Performed by: FAMILY MEDICINE

## 2025-02-11 PROCEDURE — 83036 HEMOGLOBIN GLYCOSYLATED A1C: CPT | Mod: QW,,, | Performed by: FAMILY MEDICINE

## 2025-02-11 PROCEDURE — 1159F MED LIST DOCD IN RCRD: CPT | Mod: CPTII,,, | Performed by: FAMILY MEDICINE

## 2025-02-11 PROCEDURE — 3008F BODY MASS INDEX DOCD: CPT | Mod: CPTII,,, | Performed by: FAMILY MEDICINE

## 2025-02-11 RX ORDER — OMEPRAZOLE 20 MG/1
20 CAPSULE, DELAYED RELEASE ORAL 2 TIMES DAILY
COMMUNITY
Start: 2025-02-10
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
## 2025-02-12 ENCOUNTER — TELEPHONE (OUTPATIENT)
Dept: FAMILY MEDICINE | Facility: CLINIC | Age: 65
End: 2025-02-12
Payer: MEDICARE

## 2025-02-12 NOTE — TELEPHONE ENCOUNTER
Mimi Mohr MA P [facility] Staff  We would like to thank you for the referral to [facility]. However, we are currently unable to accept patient's referral at this time because Dr. Alexis Bo is not within [facility] nor within our clinic; the referral is being cancelled; please reorder it and place it as external. Thank you. Please contact our office if you have any further questions.    With best regards,    [facility]  Dr. Eugene Kelly, Dr. Iris Jara, & Dr. Fly Velasco  [address] [place] [address] [address]  [place] [id number]  P: [phone]    F: [phone]

## 2025-02-28 ENCOUNTER — LAB VISIT (OUTPATIENT)
Dept: LAB | Facility: HOSPITAL | Age: 65
End: 2025-02-28
Attending: INTERNAL MEDICINE
Payer: MEDICARE

## 2025-02-28 DIAGNOSIS — M86.372 CHRONIC MULTIFOCAL OSTEOMYELITIS, LEFT ANKLE AND FOOT: Primary | ICD-10-CM

## 2025-02-28 LAB
ALBUMIN SERPL-MCNC: 3.5 G/DL (ref 3.4–4.8)
ALBUMIN/GLOB SERPL: 0.9 RATIO (ref 1.1–2)
ALP SERPL-CCNC: 103 UNIT/L (ref 40–150)
ALT SERPL-CCNC: 24 UNIT/L (ref 0–55)
ANION GAP SERPL CALC-SCNC: 10 MEQ/L
AST SERPL-CCNC: 41 UNIT/L (ref 5–34)
BASOPHILS # BLD AUTO: 0.02 X10(3)/MCL
BASOPHILS NFR BLD AUTO: 0.4 %
BILIRUB SERPL-MCNC: 0.3 MG/DL
BUN SERPL-MCNC: 34 MG/DL (ref 8.4–25.7)
CALCIUM SERPL-MCNC: 9 MG/DL (ref 8.8–10)
CHLORIDE SERPL-SCNC: 105 MMOL/L (ref 98–107)
CO2 SERPL-SCNC: 26 MMOL/L (ref 23–31)
CREAT SERPL-MCNC: 1.6 MG/DL (ref 0.72–1.25)
CREAT/UREA NIT SERPL: 21
CRP SERPL-MCNC: 2.4 MG/L
EOSINOPHIL # BLD AUTO: 0.32 X10(3)/MCL (ref 0–0.9)
EOSINOPHIL NFR BLD AUTO: 6.7 %
ERYTHROCYTE [DISTWIDTH] IN BLOOD BY AUTOMATED COUNT: 16.3 % (ref 11.5–17)
ERYTHROCYTE [SEDIMENTATION RATE] IN BLOOD: 68 MM/HR (ref 0–15)
GFR SERPLBLD CREATININE-BSD FMLA CKD-EPI: 48 ML/MIN/1.73/M2
GLOBULIN SER-MCNC: 3.7 GM/DL (ref 2.4–3.5)
GLUCOSE SERPL-MCNC: 102 MG/DL (ref 82–115)
HCT VFR BLD AUTO: 31.2 % (ref 42–52)
HGB BLD-MCNC: 9.9 G/DL (ref 14–18)
IMM GRANULOCYTES # BLD AUTO: 0.01 X10(3)/MCL (ref 0–0.04)
IMM GRANULOCYTES NFR BLD AUTO: 0.2 %
LYMPHOCYTES # BLD AUTO: 1.09 X10(3)/MCL (ref 0.6–4.6)
LYMPHOCYTES NFR BLD AUTO: 22.9 %
MCH RBC QN AUTO: 28 PG (ref 27–31)
MCHC RBC AUTO-ENTMCNC: 31.7 G/DL (ref 33–36)
MCV RBC AUTO: 88.4 FL (ref 80–94)
MONOCYTES # BLD AUTO: 0.43 X10(3)/MCL (ref 0.1–1.3)
MONOCYTES NFR BLD AUTO: 9 %
NEUTROPHILS # BLD AUTO: 2.89 X10(3)/MCL (ref 2.1–9.2)
NEUTROPHILS NFR BLD AUTO: 60.8 %
NRBC BLD AUTO-RTO: 0 %
PLATELET # BLD AUTO: 105 X10(3)/MCL (ref 130–400)
PMV BLD AUTO: 10.6 FL (ref 7.4–10.4)
POTASSIUM SERPL-SCNC: 4.7 MMOL/L (ref 3.5–5.1)
PROT SERPL-MCNC: 7.2 GM/DL (ref 5.8–7.6)
RBC # BLD AUTO: 3.53 X10(6)/MCL (ref 4.7–6.1)
SODIUM SERPL-SCNC: 141 MMOL/L (ref 136–145)
WBC # BLD AUTO: 4.76 X10(3)/MCL (ref 4.5–11.5)

## 2025-02-28 PROCEDURE — 85025 COMPLETE CBC W/AUTO DIFF WBC: CPT

## 2025-02-28 PROCEDURE — 85652 RBC SED RATE AUTOMATED: CPT

## 2025-02-28 PROCEDURE — 86140 C-REACTIVE PROTEIN: CPT

## 2025-02-28 PROCEDURE — 36415 COLL VENOUS BLD VENIPUNCTURE: CPT

## 2025-02-28 PROCEDURE — 80053 COMPREHEN METABOLIC PANEL: CPT

## 2025-04-01 ENCOUNTER — OFFICE VISIT (OUTPATIENT)
Dept: ORTHOPEDICS | Facility: CLINIC | Age: 65
End: 2025-04-01
Payer: MEDICARE

## 2025-04-01 VITALS
BODY MASS INDEX: 34.86 KG/M2 | HEART RATE: 76 BPM | SYSTOLIC BLOOD PRESSURE: 119 MMHG | WEIGHT: 216.94 LBS | HEIGHT: 66 IN | DIASTOLIC BLOOD PRESSURE: 72 MMHG

## 2025-04-01 DIAGNOSIS — M19.012 OSTEOARTHRITIS OF LEFT SHOULDER, UNSPECIFIED OSTEOARTHRITIS TYPE: ICD-10-CM

## 2025-04-01 PROCEDURE — 3044F HG A1C LEVEL LT 7.0%: CPT | Mod: CPTII,,, | Performed by: REHABILITATION UNIT

## 2025-04-01 PROCEDURE — 1101F PT FALLS ASSESS-DOCD LE1/YR: CPT | Mod: CPTII,,, | Performed by: REHABILITATION UNIT

## 2025-04-01 PROCEDURE — 3008F BODY MASS INDEX DOCD: CPT | Mod: CPTII,,, | Performed by: REHABILITATION UNIT

## 2025-04-01 PROCEDURE — 3074F SYST BP LT 130 MM HG: CPT | Mod: CPTII,,, | Performed by: REHABILITATION UNIT

## 2025-04-01 PROCEDURE — 3078F DIAST BP <80 MM HG: CPT | Mod: CPTII,,, | Performed by: REHABILITATION UNIT

## 2025-04-01 PROCEDURE — 99203 OFFICE O/P NEW LOW 30 MIN: CPT | Mod: 25,,, | Performed by: REHABILITATION UNIT

## 2025-04-01 PROCEDURE — 3288F FALL RISK ASSESSMENT DOCD: CPT | Mod: CPTII,,, | Performed by: REHABILITATION UNIT

## 2025-04-01 PROCEDURE — 4010F ACE/ARB THERAPY RXD/TAKEN: CPT | Mod: CPTII,,, | Performed by: REHABILITATION UNIT

## 2025-04-01 RX ADMIN — LIDOCAINE HYDROCHLORIDE 3 ML: 10 INJECTION, SOLUTION INFILTRATION; PERINEURAL at 09:04

## 2025-04-01 RX ADMIN — BETAMETHASONE SODIUM PHOSPHATE AND BETAMETHASONE ACETATE 6 MG: 3; 3 INJECTION, SUSPENSION INTRA-ARTICULAR; INTRALESIONAL; INTRAMUSCULAR; SOFT TISSUE at 09:04

## 2025-04-01 NOTE — PROGRESS NOTES
Subjective:      Patient ID: Aba Dunbar is a 64 y.o. male.    Chief Complaint: Pain of the Left Shoulder (X-ray done 11/13/24- Pain is constant and noticed a loss of strengthen. Pain radiates from shoulder into neck. Has numbness in lt hand. Is ambulating with walker. Is taking OTC tylenol which has helped most of the pain.  )    HPI:   Aba Dunbar is a 64 y.o. male who presents today for initial evaluation of his left shoulder    History of Present Illness    CHIEF COMPLAINT:  - Left shoulder pain    HPI:  Aba presents with left shoulder pain that has been bothering him for a year. Pain sometimes numbs his whole arm and extends into his neck. He reports pain when lying on the affected shoulder. Last night, he felt a popping sensation when moving his shoulder. He has not received any injections or therapy for his shoulder.    He also reports neck and back issues, including a pinched nerve and arthritis in the neck and lower back. He experiences difficulty initiating movement when standing up and has trouble getting up and walking when putting weight on his legs. He saw Dr. Bo yesterday for these issues.    He has multiple medical issues, including muscular dystrophy and a chronic wound on his foot. His blood sugar after breakfast was 190. He is on chronic antibiotics due to OM in feet.     He denies having diabetes.    IMAGING:  - XR Left Shoulder show arthritic changes within the AC joint and GH joint    MEDICATIONS:  - Chronic antibiotics: Due to a niranjan in heel    SURGICAL HISTORY:  - Niranjan fusion in heel      ROS:  Musculoskeletal: +joint pain, +back pain, +neck pain, +difficulty standing up  Integumentary: +wound  Neurological: +numbness          Past Medical History:   Diagnosis Date    Anemia, unspecified     B12 deficiency     CAD (coronary artery disease)     Charcot-Abbie-Tooth disease type 2     Chronic osteomyelitis of left ankle     Diabetes mellitus, type 2     Diabetic retinopathy      "Family history of colon cancer in father     GERD (gastroesophageal reflux disease)     History of osteomyelitis     Hyperlipidemia     Hypertension     Hypothyroidism, unspecified     VENKAT (iron deficiency anemia)     Muscular dystrophy     RITO (obstructive sleep apnea)     RITO on CPAP     Osteoarthritis of ankle and foot, right     Osteoarthritis of left knee     Personal history of colonic polyps 12/01/2022    s/p polypectomy - Dr Romel Erickson    Restless legs syndrome (RLS)      Past Surgical History:   Procedure Laterality Date    ANKLE FUSION Right 09/21/2020    Dr Maryjane Chow    ANKLE FUSION Left 07/06/2020    Dr Maryjane Chow    ANKLE HARDWARE REMOVAL Left 09/21/2020    Dr Maryjane Chow    CARDIAC CATHETERIZATION Left 01/22/2019    Dr Alexander Garcia    COLONOSCOPY W/ BIOPSIES AND POLYPECTOMY  12/01/2022    Dr Romel Erickson    CORONARY ANGIOPLASTY WITH STENT PLACEMENT Left 02/17/2016    Dr Clarence De La Rosa    CORONARY ARTERY BYPASS GRAFT  2006    x3    CORONARY STENT PLACEMENT Left 02/17/2016    Dr Clarence De La Rosa    EYE SURGERY      INCISION AND DRAINAGE  10/18/2010    INCISION AND DRAINAGE OF LOWER EXTREMITY Left 07/24/2014    Dr Maryjane Chow    INCISION AND DRAINAGE, BONE ABSCESS OR OSTEOMYELITIS, FOOT Bilateral 2007    TONSILLECTOMY  1962     Social History[1]    Current Medications[2]  Review of patient's allergies indicates:   Allergen Reactions    Hydrocodone      Other reaction(s): hyperactive  can't sleep    Hydrocodone-acetaminophen        /72   Pulse 76   Ht 5' 6" (1.676 m)   Wt 98.4 kg (216 lb 14.9 oz)   BMI 35.01 kg/m²     Comprehensive review of systems completed and negative except as per HPI.        Objective:   Head: Normocephalic, without obvious abnormality, atraumatic  Eyes: conjunctivae/corneas clear. EOM's intact  Ears: normal external appearance  Nose: Nares normal. Septum midline. Mucosa normal. No drainage  Throat: normal findings: lips normal without " lesions  Lungs: unlabored breathing on room air  Chest wall: symmetric chest rise  Heart: regular rate and rhythm  Pulses: 2+ and symmetric  Skin: Skin color, texture, turgor normal. No rashes or lesions  Neurologic: Grossly normal    left SHOULDER    Appearance:   normal    Cervical Spine:   Reduced motion     Tenderness:   Mild diffuse     AROM:   , Abduction 140, ER 70, IR sacrum     PROM:  same    Pain:  AROM: Positive  PROM:  Positive  End ROM: Positive  Supraspinatus strength testing: Positive  External rotation strength testing: Negative  Joycelyn-scapular: Negative  Virtually all provacative maneuvers Negative    Strength:  Supraspinatus: reduced  External rotation: intact  Joycelyn-scapular: intact    Provocative Maneuvers:     Rotator Cuff/Biceps/AC Joint  Neer's Sign: Positive  Hawkin's Test: Positive  Painful arc: Positive  Belly Press: Negative  Bear Hugger Test: Negative  Hornblower's Sign: Negative  Speed's Test: Positive  Yergeson's Test: Positive  Cross Arm Abduction: Positive    Pulses: Palpable radial pulse    Neurological deficits: None    The patient has a warm and well-perfused upper extremity with capillary refill less than 2 seconds. Sensation is intact to light touch in terminal nerve distributions. 5/5 ain/pin/uln. The patient has no palpable epitrochlear lymphadenopathy.      Assessment:     Imaging:   Narrative & Impression  EXAMINATION:  XR SHOULDER COMPLETE 2 OR MORE VIEWS LEFT     CLINICAL HISTORY:  Pain in left shoulder     COMPARISON:  None.     FINDINGS:  Healed midclavicular fracture on the left     No acute displaced fractures or dislocations.     There are some degenerative changes of the acromioclavicular joint with degenerative changes of the glenohumeral joint articular spaces are otherwise preserved with smooth articular surfaces     No blastic or lytic lesions.     Soft tissues within normal limits.     Impression:     Degenerative changes.     .        Electronically signed  by:Misha Shearer  Date:                                            2024  Time:                                           17:33    Radiographs of the shoulder show no acute pathology with a degenerative changes of the AC joint more so than the glenohumeral joint        1. Osteoarthritis of left shoulder, unspecified osteoarthritis type          Plan:       Orders Placed This Encounter    Large Joint Aspiration/Injection: L subacromial bursa    Ambulatory Referral/Consult to Physical Therapy        Imaging and exam findings discussed.     Assessment & Plan    REFERRALS:  - Referred to PT at the [facility]     PROCEDURES:  - # Procedures  - Recommend left shoulder steroid injection.  - Potential risks include elevated blood sugar for a few days.  - Aba agreed to the procedure.  - Monitor blood sugar the next few days following the injection.    FOLLOW UP:  - Contact the office if shoulder pain does not improve.    PATIENT INSTRUCTIONS:  - Monitor blood sugar closely following the injection.       Avoid aggravating activities and manage symptomatically.     All questions were answered. Patient happy and in agreement with the plan.     This note was generated with the assistance of ambient listening technology. Verbal consent was obtained by the patient and accompanying visitor(s) for the recording of patient appointment to facilitate this note. I attest to having reviewed and edited the generated note for accuracy, though some syntax or spelling errors may persist. Please contact the author of this note for any clarification.              [1]   Social History  Socioeconomic History    Marital status:    Tobacco Use    Smoking status: Former     Current packs/day: 0.00     Average packs/day: 1.5 packs/day for 15.0 years (22.5 ttl pk-yrs)     Types: Cigarettes     Quit date: 10/11/2016     Years since quittin.5    Smokeless tobacco: Never   Substance and Sexual Activity    Alcohol use: Not  Currently    Drug use: Yes     Frequency: 4.0 times per week     Types: Marijuana    Sexual activity: Not Currently     Birth control/protection: None     Social Drivers of Health     Financial Resource Strain: Low Risk  (5/10/2023)    Overall Financial Resource Strain (CARDIA)     Difficulty of Paying Living Expenses: Not hard at all   Food Insecurity: No Food Insecurity (5/10/2023)    Hunger Vital Sign     Worried About Running Out of Food in the Last Year: Never true     Ran Out of Food in the Last Year: Never true   Transportation Needs: No Transportation Needs (5/10/2023)    PRAPARE - Transportation     Lack of Transportation (Medical): No     Lack of Transportation (Non-Medical): No   Physical Activity: Inactive (5/10/2023)    Exercise Vital Sign     Days of Exercise per Week: 0 days     Minutes of Exercise per Session: 0 min   Stress: No Stress Concern Present (5/10/2023)    [employer] [place] [employer] - Occupational Stress Questionnaire     Feeling of Stress : Not at all   Housing Stability: Low Risk  (5/10/2023)    Housing Stability Vital Sign     Unable to Pay for Housing in the Last Year: No     Number of Places Lived in the Last Year: 1     Unstable Housing in the Last Year: No   [2]   Current Outpatient Medications:     acetaminophen (TYLENOL ARTHRITIS PAIN) 650 MG TbSR, Take 1,300 mg by mouth daily as needed (Pain)., Disp: , Rfl:     aspirin (ECOTRIN) 81 MG EC tablet, Take 81 mg by mouth once daily., Disp: , Rfl:     atorvastatin (LIPITOR) 40 MG tablet, TAKE 1 TABLET BY MOUTH EVERY DAY, Disp: 90 tablet, Rfl: 1    carvediloL (COREG) 12.5 MG tablet, Take 12.5 mg by mouth 2 (two) times daily., Disp: , Rfl:     ciprofloxacin HCl (CIPRO) 500 MG tablet, Take 500 mg by mouth 2 (two) times daily., Disp: , Rfl:     doxycycline (VIBRA-TABS) 100 MG tablet, Take 100 mg by mouth 2 (two) times daily., Disp: , Rfl:     ferrous sulfate (FEOSOL) 325 mg (65 mg iron) Tab tablet, Take 325 mg by mouth 2 (two)  times daily., Disp: , Rfl:     fluticasone propionate (FLONASE) 50 mcg/actuation nasal spray, 2 sprays by Each Nostril route Daily., Disp: , Rfl:     furosemide (LASIX) 20 MG tablet, TAKE 1 TABLET BY MOUTH EVERY DAY AS NEEDED, Disp: 90 tablet, Rfl: 3    gabapentin (NEURONTIN) 100 MG capsule, Take 100 mg by mouth every evening., Disp: , Rfl:     levothyroxine (SYNTHROID) 50 MCG tablet, TAKE 1 TABLET BY MOUTH EVERY DAY, Disp: 90 tablet, Rfl: 1    metFORMIN (GLUCOPHAGE) 1000 MG tablet, Take 1 tablet (1,000 mg total) by mouth 2 (two) times daily., Disp: 180 tablet, Rfl: 1    omeprazole (PRILOSEC) 20 MG capsule, Take 20 mg by mouth 2 (two) times daily., Disp: , Rfl:     pantoprazole (PROTONIX) 40 MG tablet, TAKE 1 TABLET BY MOUTH EVERY DAY, Disp: 90 tablet, Rfl: 3    pyridoxine, vitamin B6, (B-6) 100 MG Tab, Take 50 mg by mouth once daily., Disp: , Rfl:     ramipriL (ALTACE) 2.5 MG capsule, Take 2.5 mg by mouth once daily., Disp: , Rfl:     folic acid (FOLVITE) 1 MG tablet, Take 1 tablet (1 mg total) by mouth once daily., Disp: 30 tablet, Rfl: 3

## 2025-04-01 NOTE — PROCEDURES
Large Joint Aspiration/Injection: L subacromial bursa    Date/Time: 4/1/2025 9:30 AM    Performed by: Yessenia Russ PA-C  Authorized by: Sam Xiao MD    Consent Done?:  Yes (Verbal)  Indications:  Arthritis and pain  Local anesthesia used?: No      Details:  Needle Size:  22 G  Approach:  Posterior  Location:  Shoulder  Site:  L subacromial bursa  Medications:  3 mL LIDOcaine HCL 10 mg/ml (1%) 10 mg/mL (1 %); 6 mg betamethasone acetate-betamethasone sodium phosphate 6 mg/mL

## 2025-04-04 ENCOUNTER — LAB VISIT (OUTPATIENT)
Dept: LAB | Facility: HOSPITAL | Age: 65
End: 2025-04-04
Attending: INTERNAL MEDICINE
Payer: MEDICARE

## 2025-04-04 DIAGNOSIS — D64.9 ANEMIA, UNSPECIFIED TYPE: ICD-10-CM

## 2025-04-04 DIAGNOSIS — D50.9 IRON DEFICIENCY ANEMIA, UNSPECIFIED IRON DEFICIENCY ANEMIA TYPE: ICD-10-CM

## 2025-04-04 DIAGNOSIS — D51.8 OTHER VITAMIN B12 DEFICIENCY ANEMIA: ICD-10-CM

## 2025-04-04 LAB
ALBUMIN SERPL-MCNC: 3.7 G/DL (ref 3.4–4.8)
ALBUMIN/GLOB SERPL: 1 RATIO (ref 1.1–2)
ALP SERPL-CCNC: 123 UNIT/L (ref 40–150)
ALT SERPL-CCNC: 28 UNIT/L (ref 0–55)
ANION GAP SERPL CALC-SCNC: 14 MEQ/L
AST SERPL-CCNC: 40 UNIT/L (ref 11–45)
BASOPHILS # BLD AUTO: 0.02 X10(3)/MCL
BASOPHILS NFR BLD AUTO: 0.3 %
BILIRUB SERPL-MCNC: 0.4 MG/DL
BUN SERPL-MCNC: 35.3 MG/DL (ref 8.4–25.7)
CALCIUM SERPL-MCNC: 8.7 MG/DL (ref 8.8–10)
CHLORIDE SERPL-SCNC: 104 MMOL/L (ref 98–107)
CO2 SERPL-SCNC: 22 MMOL/L (ref 23–31)
CREAT SERPL-MCNC: 1.62 MG/DL (ref 0.72–1.25)
CREAT/UREA NIT SERPL: 22
EOSINOPHIL # BLD AUTO: 0.21 X10(3)/MCL (ref 0–0.9)
EOSINOPHIL NFR BLD AUTO: 3.2 %
ERYTHROCYTE [DISTWIDTH] IN BLOOD BY AUTOMATED COUNT: 16.8 % (ref 11.5–17)
FERRITIN SERPL-MCNC: 89.55 NG/ML (ref 21.81–274.66)
FOLATE SERPL-MCNC: 6.7 NG/ML (ref 7–31.4)
GFR SERPLBLD CREATININE-BSD FMLA CKD-EPI: 47 ML/MIN/1.73/M2
GLOBULIN SER-MCNC: 3.7 GM/DL (ref 2.4–3.5)
GLUCOSE SERPL-MCNC: 91 MG/DL (ref 82–115)
HCT VFR BLD AUTO: 33.5 % (ref 42–52)
HGB BLD-MCNC: 10.7 G/DL (ref 14–18)
IMM GRANULOCYTES # BLD AUTO: 0.01 X10(3)/MCL (ref 0–0.04)
IMM GRANULOCYTES NFR BLD AUTO: 0.2 %
IRON SATN MFR SERPL: 15 % (ref 20–50)
IRON SERPL-MCNC: 41 UG/DL (ref 65–175)
LYMPHOCYTES # BLD AUTO: 1.67 X10(3)/MCL (ref 0.6–4.6)
LYMPHOCYTES NFR BLD AUTO: 25.7 %
MCH RBC QN AUTO: 29.2 PG (ref 27–31)
MCHC RBC AUTO-ENTMCNC: 31.9 G/DL (ref 33–36)
MCV RBC AUTO: 91.3 FL (ref 80–94)
MONOCYTES # BLD AUTO: 0.63 X10(3)/MCL (ref 0.1–1.3)
MONOCYTES NFR BLD AUTO: 9.7 %
NEUTROPHILS # BLD AUTO: 3.95 X10(3)/MCL (ref 2.1–9.2)
NEUTROPHILS NFR BLD AUTO: 60.9 %
PLATELET # BLD AUTO: 117 X10(3)/MCL (ref 130–400)
PMV BLD AUTO: 10.1 FL (ref 7.4–10.4)
POTASSIUM SERPL-SCNC: 4.8 MMOL/L (ref 3.5–5.1)
PROT SERPL-MCNC: 7.4 GM/DL (ref 5.8–7.6)
RBC # BLD AUTO: 3.67 X10(6)/MCL (ref 4.7–6.1)
SODIUM SERPL-SCNC: 140 MMOL/L (ref 136–145)
TIBC SERPL-MCNC: 237 UG/DL (ref 60–240)
TIBC SERPL-MCNC: 278 UG/DL (ref 250–450)
TRANSFERRIN SERPL-MCNC: 247 MG/DL (ref 163–344)
VIT B12 SERPL-MCNC: 438 PG/ML (ref 213–816)
WBC # BLD AUTO: 6.49 X10(3)/MCL (ref 4.5–11.5)

## 2025-04-04 PROCEDURE — 82746 ASSAY OF FOLIC ACID SERUM: CPT

## 2025-04-04 PROCEDURE — 85025 COMPLETE CBC W/AUTO DIFF WBC: CPT

## 2025-04-04 PROCEDURE — 80053 COMPREHEN METABOLIC PANEL: CPT

## 2025-04-04 PROCEDURE — 83550 IRON BINDING TEST: CPT

## 2025-04-04 PROCEDURE — 82607 VITAMIN B-12: CPT

## 2025-04-04 PROCEDURE — 36415 COLL VENOUS BLD VENIPUNCTURE: CPT

## 2025-04-04 PROCEDURE — 82728 ASSAY OF FERRITIN: CPT

## 2025-04-08 NOTE — PROGRESS NOTES
"Subjective:       Patient ID: Aba Dunbar is a 64 y.o. male.    Chief Complaint: "I am having back surgery"    Diagnosis:  Iron deficiency anemia  Vitamin B12 deficiency     Current Treatment:  Ferrous sulfate 325 mg b.i.d. (started 2/16/23); Monthly B12     Clinical History:  Patient was referred by his PCP for evaluation of anemia on routine laboratory.  CBC from 2/3/23 showed a hemoglobin of 8.8 and hematocrit 31.2 with an MCV of 79.8 and RDW 19.9.  WBC and platelet counts were normal.  Reticulocyte count was 1.6%.  Additional laboratory drawn 2/7/23 confirmed iron deficiency with a serum iron of 38, TIBC 348, saturation 10% and ferritin level 22 ng/mL.  Review of previous laboratory, shows longstanding anemia dating back at least 2 years.  He had symptoms of PICA for ice.  He denied any abdominal symptoms or complaints.  No weight loss.  No change in his bowel habits, no melena or hematochezia.  Screening colonoscopy approximately 6 months prior- small polyps removed.      He was seen as a new patient 2/15/23.  Treatment was recommended with ferrous sulfate 325 mg b.i.d. His anemia improved on treatment his PICA symptoms resolved.    EGD 5/18/23 showed mild gastritis and patchy erythema with ulceration about the lesser curvature of the stomach.  He was placed on Protonix.  Pathology showed no H pylori or evidence of malignancy.    Laboratory showed mild thrombocytopenia.  Anti-platelet antibodies were negative.    Interval History  Patient is here today for a hematology follow-up visit.  He is ambulatory with a walker.  He is currently getting medical and cardiology clearance for spinal surgery with Dr. Bo.  He has chronic back pain with radiculopathy.  He denies any recent illnesses or hospitalizations.  He is taking his oral iron BID as directed without associated side effects.  He is no longer receiving B12.  Laboratory for this visit shows stable anemia and mild thrombocytopenia.  Iron studies show " "a low serum iron and an iron saturation, and decreasing serum ferritin level.  He has chronic fatigue, but no significant dyspnea and no pica symptoms.  Folate level was also slightly low.  He denies melena and/or hematochezia.      Review of Systems   Constitutional:  Positive for fatigue. Negative for appetite change, fever and unexpected weight change.   HENT:  Negative for mouth sores, sore throat and trouble swallowing.    Eyes: Negative.  Negative for visual disturbance.   Respiratory:  Negative for cough and shortness of breath.    Cardiovascular:  Negative for chest pain, palpitations and leg swelling.   Gastrointestinal:  Negative for abdominal distention, abdominal pain, blood in stool, constipation, diarrhea and nausea.   Genitourinary:  Negative for dysuria, frequency and urgency.   Musculoskeletal:  Positive for arthralgias and back pain (Chronic, lower back).   Integumentary:  Negative for pallor and rash.   Neurological:  Negative for dizziness, weakness, numbness and headaches.   Hematological:  Negative for adenopathy. Does not bruise/bleed easily.   Psychiatric/Behavioral: Negative.  The patient is not nervous/anxious.          PMHx:  HTN, hyperlipidemia, CAD, DM, hypothyroid, arthritis, Charcot-Babie-Tooth disease, GERD, RLS  PSHx:  Tonsils, bilateral ankle fusions, CABG  SH:  Former smoker 1 ppd, quit age 46.  Occasional alcohol use.  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  FH:  His father had colon cancer.    Objective:        /77 (Patient Position: Sitting)   Pulse 76   Temp 97.5 °F (36.4 °C)   Resp 15   Ht 5' 6" (1.676 m)   Wt 97.5 kg (215 lb)   SpO2 98%   BMI 34.70 kg/m²    Physical Exam  Constitutional:       Appearance: He is ill-appearing (chronically).      Comments: Morbidly obese white male in [facility], ambulatory with a rolling walker   HENT:      Head: Normocephalic and atraumatic.      Mouth/Throat:      Mouth: Mucous membranes are moist.      Pharynx: Oropharynx " is clear. No posterior oropharyngeal erythema.   Eyes:      General: No scleral icterus.     Extraocular Movements: Extraocular movements intact.      Conjunctiva/sclera: Conjunctivae normal.   Cardiovascular:      Rate and Rhythm: Normal rate and regular rhythm.      Heart sounds: No murmur heard.     Comments: Well-healed sternotomy incision  Pulmonary:      Effort: Pulmonary effort is normal.      Breath sounds: Normal breath sounds.   Abdominal:      General: Bowel sounds are normal. There is no distension.      Palpations: Abdomen is soft. There is no mass.      Tenderness: There is no abdominal tenderness.      Comments: Obese.   Musculoskeletal:         General: No swelling or tenderness. Normal range of motion.      Cervical back: Neck supple. No tenderness.   Lymphadenopathy:      Cervical: No cervical adenopathy.   Skin:     General: Skin is warm and dry.      Findings: Bruising present. No rash.   Neurological:      General: No focal deficit present.      Mental Status: He is alert and oriented to person, place, and time.      Cranial Nerves: No cranial nerve deficit.      Motor: No weakness.            LABORATORY  Recent Results (from the past week)   Comprehensive Metabolic Panel    Collection Time: 04/04/25 10:50 AM   Result Value Ref Range    Sodium 140 136 - 145 mmol/L    Potassium 4.8 3.5 - 5.1 mmol/L    Chloride 104 98 - 107 mmol/L    CO2 22 (L) 23 - 31 mmol/L    Glucose 91 82 - 115 mg/dL    Blood Urea Nitrogen 35.3 (H) 8.4 - 25.7 mg/dL    Creatinine 1.62 (H) 0.72 - 1.25 mg/dL    Calcium 8.7 (L) 8.8 - 10.0 mg/dL    Protein Total 7.4 5.8 - 7.6 gm/dL    Albumin 3.7 3.4 - 4.8 g/dL    Globulin 3.7 (H) 2.4 - 3.5 gm/dL    Albumin/Globulin Ratio 1.0 (L) 1.1 - 2.0 ratio    Bilirubin Total 0.4 <=1.5 mg/dL     40 - 150 unit/L    ALT 28 0 - 55 unit/L    AST 40 11 - 45 unit/L    eGFR 47 mL/min/1.73/m2    Anion Gap 14.0 mEq/L    BUN/Creatinine Ratio 22    Ferritin    Collection Time: 04/04/25 10:50 AM    Result Value Ref Range    Ferritin Level 89.55 21.81 - 274.66 ng/mL   Iron and TIBC    Collection Time: 04/04/25 10:50 AM   Result Value Ref Range    Iron Binding Capacity Unsaturated 237 60 - 240 ug/dL    Iron Level 41 (L) 65 - 175 ug/dL    Transferrin 247 163 - 344 mg/dL    Iron Binding Capacity Total 278 250 - 450 ug/dL    Iron Saturation 15 (L) 20 - 50 %   Folate    Collection Time: 04/04/25 10:50 AM   Result Value Ref Range    Folate Level 6.7 (L) 7.0 - 31.4 ng/mL   Vitamin B12    Collection Time: 04/04/25 10:50 AM   Result Value Ref Range    Vitamin B12 438 213 - 816 pg/mL   CBC with Differential    Collection Time: 04/04/25 10:50 AM   Result Value Ref Range    WBC 6.49 4.50 - 11.50 x10(3)/mcL    RBC 3.67 (L) 4.70 - 6.10 x10(6)/mcL    Hgb 10.7 (L) 14.0 - 18.0 g/dL    Hct 33.5 (L) 42.0 - 52.0 %    MCV 91.3 80.0 - 94.0 fL    MCH 29.2 27.0 - 31.0 pg    MCHC 31.9 (L) 33.0 - 36.0 g/dL    RDW 16.8 11.5 - 17.0 %    Platelet 117 (L) 130 - 400 x10(3)/mcL    MPV 10.1 7.4 - 10.4 fL    Neut % 60.9 %    Lymph % 25.7 %    Mono % 9.7 %    Eos % 3.2 %    Basophil % 0.3 %    Imm Grans % 0.2 %    Neut # 3.95 2.1 - 9.2 x10(3)/mcL    Lymph # 1.67 0.6 - 4.6 x10(3)/mcL    Mono # 0.63 0.1 - 1.3 x10(3)/mcL    Eos # 0.21 0 - 0.9 x10(3)/mcL    Baso # 0.02 <=0.2 x10(3)/mcL    Imm Gran # 0.01 0.00 - 0.04 x10(3)/mcL         Assessment:   Iron deficiency anemia   Vitamin B12 deficiency - resolved  Thrombocytopenia    Plan:   Begin Folic Acid 1 mg daily.  Iron studies are compatible with worsening iron deficiency.    He has never received IV iron.  I recommended treatment with Injectafer 750 mg IV in one dose.  Other IV iron preparations would require multiple doses over several weeks and patient [place] over [place] miles away and must rely on transportation.    Consented the patient to the treatment plan and the patient was educated on the planned duration of the treatment and schedule of the treatment administration. He is in agreement.  LAURI  oral iron once infusion is complete.  RTC 3 months for follow-up with CBC, iron profile, ferritin.  Platelet count is >[id number], so there is no contraindication to spinal surgery.  All questions answered.      RADHA PENALOZA, FNP-C  [facility] [place]       Other Physicians  Dr. Ventura Erickson

## 2025-04-10 ENCOUNTER — OFFICE VISIT (OUTPATIENT)
Dept: HEMATOLOGY/ONCOLOGY | Facility: CLINIC | Age: 65
End: 2025-04-10
Payer: MEDICARE

## 2025-04-10 VITALS
RESPIRATION RATE: 15 BRPM | SYSTOLIC BLOOD PRESSURE: 117 MMHG | OXYGEN SATURATION: 98 % | WEIGHT: 215 LBS | TEMPERATURE: 98 F | HEART RATE: 76 BPM | DIASTOLIC BLOOD PRESSURE: 77 MMHG | HEIGHT: 66 IN | BODY MASS INDEX: 34.55 KG/M2

## 2025-04-10 DIAGNOSIS — E53.8 FOLIC ACID DEFICIENCY: Primary | ICD-10-CM

## 2025-04-10 DIAGNOSIS — D50.8 OTHER IRON DEFICIENCY ANEMIA: ICD-10-CM

## 2025-04-10 DIAGNOSIS — D50.9 IRON DEFICIENCY ANEMIA, UNSPECIFIED IRON DEFICIENCY ANEMIA TYPE: ICD-10-CM

## 2025-04-10 PROCEDURE — 3044F HG A1C LEVEL LT 7.0%: CPT | Mod: CPTII,S$GLB,, | Performed by: NURSE PRACTITIONER

## 2025-04-10 PROCEDURE — 4010F ACE/ARB THERAPY RXD/TAKEN: CPT | Mod: CPTII,S$GLB,, | Performed by: NURSE PRACTITIONER

## 2025-04-10 PROCEDURE — 3074F SYST BP LT 130 MM HG: CPT | Mod: CPTII,S$GLB,, | Performed by: NURSE PRACTITIONER

## 2025-04-10 PROCEDURE — 99999 PR PBB SHADOW E&M-EST. PATIENT-LVL IV: CPT | Mod: PBBFAC,,, | Performed by: NURSE PRACTITIONER

## 2025-04-10 PROCEDURE — 3078F DIAST BP <80 MM HG: CPT | Mod: CPTII,S$GLB,, | Performed by: NURSE PRACTITIONER

## 2025-04-10 PROCEDURE — 1160F RVW MEDS BY RX/DR IN RCRD: CPT | Mod: CPTII,S$GLB,, | Performed by: NURSE PRACTITIONER

## 2025-04-10 PROCEDURE — 1125F AMNT PAIN NOTED PAIN PRSNT: CPT | Mod: CPTII,S$GLB,, | Performed by: NURSE PRACTITIONER

## 2025-04-10 PROCEDURE — 99214 OFFICE O/P EST MOD 30 MIN: CPT | Mod: S$GLB,,, | Performed by: NURSE PRACTITIONER

## 2025-04-10 PROCEDURE — 1159F MED LIST DOCD IN RCRD: CPT | Mod: CPTII,S$GLB,, | Performed by: NURSE PRACTITIONER

## 2025-04-10 PROCEDURE — 1101F PT FALLS ASSESS-DOCD LE1/YR: CPT | Mod: CPTII,S$GLB,, | Performed by: NURSE PRACTITIONER

## 2025-04-10 PROCEDURE — 3008F BODY MASS INDEX DOCD: CPT | Mod: CPTII,S$GLB,, | Performed by: NURSE PRACTITIONER

## 2025-04-10 PROCEDURE — 3288F FALL RISK ASSESSMENT DOCD: CPT | Mod: CPTII,S$GLB,, | Performed by: NURSE PRACTITIONER

## 2025-04-10 RX ORDER — HEPARIN 100 UNIT/ML
500 SYRINGE INTRAVENOUS
OUTPATIENT
Start: 2025-04-17

## 2025-04-10 RX ORDER — FOLIC ACID 1 MG/1
1 TABLET ORAL DAILY
Qty: 30 TABLET | Refills: 3 | Status: SHIPPED | OUTPATIENT
Start: 2025-04-10

## 2025-04-10 RX ORDER — SODIUM CHLORIDE 0.9 % (FLUSH) 0.9 %
10 SYRINGE (ML) INJECTION
OUTPATIENT
Start: 2025-04-17

## 2025-04-10 RX ORDER — EPINEPHRINE 0.3 MG/.3ML
0.3 INJECTION SUBCUTANEOUS ONCE AS NEEDED
OUTPATIENT
Start: 2025-04-17

## 2025-04-15 RX ORDER — LIDOCAINE HYDROCHLORIDE 10 MG/ML
3 INJECTION, SOLUTION INFILTRATION; PERINEURAL
Status: DISCONTINUED | OUTPATIENT
Start: 2025-04-01 | End: 2025-04-15 | Stop reason: HOSPADM

## 2025-04-15 RX ORDER — BETAMETHASONE SODIUM PHOSPHATE AND BETAMETHASONE ACETATE 3; 3 MG/ML; MG/ML
6 INJECTION, SUSPENSION INTRA-ARTICULAR; INTRALESIONAL; INTRAMUSCULAR; SOFT TISSUE
Status: DISCONTINUED | OUTPATIENT
Start: 2025-04-01 | End: 2025-04-15 | Stop reason: HOSPADM

## 2025-04-17 ENCOUNTER — INFUSION (OUTPATIENT)
Dept: INFUSION THERAPY | Facility: HOSPITAL | Age: 65
End: 2025-04-17
Attending: INTERNAL MEDICINE
Payer: MEDICARE

## 2025-04-17 VITALS — DIASTOLIC BLOOD PRESSURE: 74 MMHG | SYSTOLIC BLOOD PRESSURE: 133 MMHG | TEMPERATURE: 99 F | HEART RATE: 80 BPM

## 2025-04-17 DIAGNOSIS — D50.8 OTHER IRON DEFICIENCY ANEMIA: Primary | ICD-10-CM

## 2025-04-17 PROCEDURE — 96374 THER/PROPH/DIAG INJ IV PUSH: CPT

## 2025-04-17 PROCEDURE — 63600175 PHARM REV CODE 636 W HCPCS: Mod: JZ,TB | Performed by: NURSE PRACTITIONER

## 2025-04-17 RX ORDER — HEPARIN 100 UNIT/ML
500 SYRINGE INTRAVENOUS
Status: CANCELLED | OUTPATIENT
Start: 2025-04-17

## 2025-04-17 RX ORDER — HEPARIN 100 UNIT/ML
500 SYRINGE INTRAVENOUS
Status: DISCONTINUED | OUTPATIENT
Start: 2025-04-17 | End: 2025-04-17 | Stop reason: HOSPADM

## 2025-04-17 RX ORDER — SODIUM CHLORIDE 0.9 % (FLUSH) 0.9 %
10 SYRINGE (ML) INJECTION
Status: DISCONTINUED | OUTPATIENT
Start: 2025-04-17 | End: 2025-04-17 | Stop reason: HOSPADM

## 2025-04-17 RX ORDER — EPINEPHRINE 0.3 MG/.3ML
0.3 INJECTION SUBCUTANEOUS ONCE AS NEEDED
Status: DISCONTINUED | OUTPATIENT
Start: 2025-04-17 | End: 2025-04-17 | Stop reason: HOSPADM

## 2025-04-17 RX ORDER — EPINEPHRINE 0.3 MG/.3ML
0.3 INJECTION SUBCUTANEOUS ONCE AS NEEDED
Status: CANCELLED | OUTPATIENT
Start: 2025-04-17

## 2025-04-17 RX ORDER — SODIUM CHLORIDE 0.9 % (FLUSH) 0.9 %
10 SYRINGE (ML) INJECTION
Status: CANCELLED | OUTPATIENT
Start: 2025-04-17

## 2025-04-17 RX ADMIN — FERRIC CARBOXYMALTOSE INJECTION 750 MG: 50 INJECTION, SOLUTION INTRAVENOUS at 10:04

## 2025-05-04 DIAGNOSIS — E11.9 TYPE 2 DIABETES MELLITUS WITHOUT COMPLICATION, WITHOUT LONG-TERM CURRENT USE OF INSULIN: ICD-10-CM

## 2025-05-04 DIAGNOSIS — K21.00 GASTROESOPHAGEAL REFLUX DISEASE WITH ESOPHAGITIS WITHOUT HEMORRHAGE: ICD-10-CM

## 2025-05-05 ENCOUNTER — OFFICE VISIT (OUTPATIENT)
Dept: FAMILY MEDICINE | Facility: CLINIC | Age: 65
End: 2025-05-05
Payer: MEDICARE

## 2025-05-05 VITALS
OXYGEN SATURATION: 98 % | TEMPERATURE: 97 F | SYSTOLIC BLOOD PRESSURE: 94 MMHG | DIASTOLIC BLOOD PRESSURE: 58 MMHG | BODY MASS INDEX: 33.75 KG/M2 | HEIGHT: 66 IN | WEIGHT: 210 LBS | HEART RATE: 118 BPM | RESPIRATION RATE: 18 BRPM

## 2025-05-05 DIAGNOSIS — E78.5 HYPERLIPIDEMIA, UNSPECIFIED HYPERLIPIDEMIA TYPE: ICD-10-CM

## 2025-05-05 DIAGNOSIS — I10 PRIMARY HYPERTENSION: Chronic | ICD-10-CM

## 2025-05-05 DIAGNOSIS — Z87.891 EX-SMOKER: ICD-10-CM

## 2025-05-05 DIAGNOSIS — E11.9 TYPE 2 DIABETES MELLITUS WITHOUT COMPLICATION, WITHOUT LONG-TERM CURRENT USE OF INSULIN: Primary | ICD-10-CM

## 2025-05-05 PROCEDURE — 99214 OFFICE O/P EST MOD 30 MIN: CPT | Mod: ,,, | Performed by: FAMILY MEDICINE

## 2025-05-05 PROCEDURE — 1126F AMNT PAIN NOTED NONE PRSNT: CPT | Mod: CPTII,,, | Performed by: FAMILY MEDICINE

## 2025-05-05 PROCEDURE — 3074F SYST BP LT 130 MM HG: CPT | Mod: CPTII,,, | Performed by: FAMILY MEDICINE

## 2025-05-05 PROCEDURE — 3044F HG A1C LEVEL LT 7.0%: CPT | Mod: CPTII,,, | Performed by: FAMILY MEDICINE

## 2025-05-05 PROCEDURE — G2211 COMPLEX E/M VISIT ADD ON: HCPCS | Mod: ,,, | Performed by: FAMILY MEDICINE

## 2025-05-05 PROCEDURE — 3008F BODY MASS INDEX DOCD: CPT | Mod: CPTII,,, | Performed by: FAMILY MEDICINE

## 2025-05-05 PROCEDURE — 3078F DIAST BP <80 MM HG: CPT | Mod: CPTII,,, | Performed by: FAMILY MEDICINE

## 2025-05-05 PROCEDURE — 1159F MED LIST DOCD IN RCRD: CPT | Mod: CPTII,,, | Performed by: FAMILY MEDICINE

## 2025-05-05 PROCEDURE — 1160F RVW MEDS BY RX/DR IN RCRD: CPT | Mod: CPTII,,, | Performed by: FAMILY MEDICINE

## 2025-05-05 PROCEDURE — 4010F ACE/ARB THERAPY RXD/TAKEN: CPT | Mod: CPTII,,, | Performed by: FAMILY MEDICINE

## 2025-05-05 RX ORDER — PANTOPRAZOLE SODIUM 40 MG/1
40 TABLET, DELAYED RELEASE ORAL
Qty: 90 TABLET | Refills: 3 | Status: SHIPPED | OUTPATIENT
Start: 2025-05-05

## 2025-05-05 RX ORDER — METFORMIN HYDROCHLORIDE 1000 MG/1
1000 TABLET ORAL 2 TIMES DAILY
Qty: 180 TABLET | Refills: 1 | Status: SHIPPED | OUTPATIENT
Start: 2025-05-05

## 2025-05-05 NOTE — PROGRESS NOTES
"Subjective:     Patient ID: Aba Dunbar is a 64 y.o. male.    Chief Complaint: 6 month f/u        History of Present Illness    CHIEF COMPLAINT:  Patient presents today for follow-up    LOW BACK PAIN:  He reports experiencing low back pain and needs to reschedule a previously canceled appointment from January regarding this issue.    DIABETES:  He reports well-controlled blood sugars and denies any tingling or burning sensation in feet.    MEDICATIONS:  He has two months of medication supply remaining with one refill left, but is uncertain about the prescribing provider.    SOCIAL HISTORY:  He quit smoking in 2016.    UPCOMING MEDICAL CARE:  He has an upcoming neck ultrasound scheduled for next month at the hospital. He has appointments with a gynecologist every three months. A lung cancer screening is due.      ROS:  Musculoskeletal: +back pain  Neurological: +numbness, no tingling            Review of Systems    Objective:     BP (!) 94/58   Pulse (!) 118   Temp 97 °F (36.1 °C) (Temporal)   Resp 18   Ht 5' 5.75" (1.67 m)   Wt 95.3 kg (210 lb)   SpO2 98%   BMI 34.16 kg/m²    Physical Exam  Constitutional:       Appearance: Normal appearance.   Cardiovascular:      Rate and Rhythm: Normal rate and regular rhythm.      Pulses:           Dorsalis pedis pulses are 1+ on the right side and 1+ on the left side.        Posterior tibial pulses are 1+ on the right side and 1+ on the left side.      Heart sounds: Normal heart sounds.   Pulmonary:      Effort: Pulmonary effort is normal.      Breath sounds: Normal breath sounds.   Musculoskeletal:      Right foot: Normal range of motion. No deformity.      Left foot: Normal range of motion. No deformity.   Feet:      Right foot:      Protective Sensation: 5 sites tested.  0 sites sensed.      Skin integrity: Callus present.      Toenail Condition: Right toenails are normal.      Left foot:      Protective Sensation: 5 sites tested.  0 sites sensed.      Skin " integrity: Callus present.      Toenail Condition: Left toenails are normal.   Neurological:      Mental Status: He is alert.   Psychiatric:         Mood and Affect: Mood normal.         Behavior: Behavior normal.         Thought Content: Thought content normal.         Judgment: Judgment normal.             Assessment:     Problem List Items Addressed This Visit          Cardiac/Vascular    Hyperlipidemia    Overview   Continue Lipitor   FLP with next lab work         Primary hypertension (Chronic)    Overview   Controlled  Continue current Rx medication (Coreg/Lasix/ramipril)  Return to clinic with any concerns                Endocrine    Type 2 diabetes mellitus without complication, without long-term current use of insulin - Primary    Overview   Continue metformin   Monitor CBGS   A1c with next lab work   Diabetic foot exam performed   Patient to schedule annual diabetic eye exam with Dr. Bennett            Other    Ex-smoker    Overview   Patient to remain abstinent from tobacco products   Schedule lung cancer screening   Schedule AAA screening         Relevant Orders    CT Chest Lung Screening Low Dose    US Retroperitoneal Limited        Plan:   1. Type 2 diabetes mellitus without complication, without long-term current use of insulin  Overview:  Continue metformin   Monitor CBGS   A1c with next lab work   Diabetic foot exam performed   Patient to schedule annual diabetic eye exam with Dr. Bennett    2. Primary hypertension  Overview:  Controlled  Continue current Rx medication (Coreg/Lasix/ramipril)  Return to clinic with any concerns    3. Hyperlipidemia, unspecified hyperlipidemia type  Overview:  Continue Lipitor   FLP with next lab work    4. Ex-smoker  Overview:  Patient to remain abstinent from tobacco products   Schedule lung cancer screening   Schedule AAA screening    Orders:  -     CT Chest Lung Screening Low Dose; Future; Expected date: 05/05/2025  -     US Retroperitoneal Limited; Future;  Expected date: 05/05/2025                   This note was generated with the assistance of ambient listening technology. Verbal consent was obtained by the patient and accompanying visitor(s) for the recording of patient appointment to facilitate this note. I attest to having reviewed and edited the generated note for accuracy, though some syntax or spelling errors may persist. Please contact the author of this note for any clarification.

## 2025-05-13 ENCOUNTER — DOCUMENTATION ONLY (OUTPATIENT)
Facility: CLINIC | Age: 65
End: 2025-05-13
Payer: MEDICARE

## 2025-05-14 ENCOUNTER — HOSPITAL ENCOUNTER (OUTPATIENT)
Dept: RADIOLOGY | Facility: HOSPITAL | Age: 65
Discharge: HOME | End: 2025-05-14
Attending: FAMILY MEDICINE
Payer: MEDICARE

## 2025-05-14 DIAGNOSIS — Z87.891 EX-SMOKER: ICD-10-CM

## 2025-05-14 PROCEDURE — 76775 US EXAM ABDO BACK WALL LIM: CPT | Mod: TC

## 2025-05-14 PROCEDURE — 71271 CT THORAX LUNG CANCER SCR C-: CPT | Mod: TC

## 2025-05-26 DIAGNOSIS — M51.9 LUMBAR DISC DISEASE: Primary | ICD-10-CM

## 2025-05-26 DIAGNOSIS — M50.90 CERVICAL DISC DISEASE: ICD-10-CM

## 2025-05-27 ENCOUNTER — TELEPHONE (OUTPATIENT)
Dept: FAMILY MEDICINE | Facility: CLINIC | Age: 65
End: 2025-05-27
Payer: MEDICARE

## 2025-05-27 DIAGNOSIS — G60.0 HEREDITARY MOTOR AND SENSORY NEUROPATHY: Primary | ICD-10-CM

## 2025-05-27 DIAGNOSIS — M86.372 CHRONIC MULTIFOCAL OSTEOMYELITIS, LEFT ANKLE AND FOOT: ICD-10-CM

## 2025-05-27 RX ORDER — GABAPENTIN 100 MG/1
100 CAPSULE ORAL
Qty: 30 CAPSULE | Refills: 1 | Status: SHIPPED | OUTPATIENT
Start: 2025-05-27

## 2025-05-27 NOTE — TELEPHONE ENCOUNTER
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

## 2025-06-06 DIAGNOSIS — E78.5 HYPERLIPIDEMIA, UNSPECIFIED HYPERLIPIDEMIA TYPE: ICD-10-CM

## 2025-06-06 DIAGNOSIS — E11.9 TYPE 2 DIABETES MELLITUS WITHOUT COMPLICATION, WITHOUT LONG-TERM CURRENT USE OF INSULIN: ICD-10-CM

## 2025-06-06 DIAGNOSIS — M86.372 CHRONIC MULTIFOCAL OSTEOMYELITIS, LEFT ANKLE AND FOOT: Primary | ICD-10-CM

## 2025-06-06 DIAGNOSIS — E03.9 HYPOTHYROIDISM, UNSPECIFIED TYPE: ICD-10-CM

## 2025-06-06 DIAGNOSIS — K21.00 GASTROESOPHAGEAL REFLUX DISEASE WITH ESOPHAGITIS WITHOUT HEMORRHAGE: ICD-10-CM

## 2025-06-06 DIAGNOSIS — I10 HYPERTENSION, UNSPECIFIED TYPE: ICD-10-CM

## 2025-06-06 RX ORDER — FUROSEMIDE 20 MG/1
20 TABLET ORAL DAILY PRN
Qty: 30 TABLET | Refills: 11 | Status: SHIPPED | OUTPATIENT
Start: 2025-06-06

## 2025-06-06 RX ORDER — CIPROFLOXACIN 500 MG/1
500 TABLET, FILM COATED ORAL 2 TIMES DAILY
Qty: 60 TABLET | Refills: 2 | Status: SHIPPED | OUTPATIENT
Start: 2025-06-06

## 2025-06-06 RX ORDER — PANTOPRAZOLE SODIUM 40 MG/1
40 TABLET, DELAYED RELEASE ORAL DAILY
Qty: 30 TABLET | Refills: 11 | Status: SHIPPED | OUTPATIENT
Start: 2025-06-06

## 2025-06-06 RX ORDER — DOXYCYCLINE HYCLATE 100 MG
100 TABLET ORAL 2 TIMES DAILY
Qty: 60 TABLET | Refills: 2 | Status: SHIPPED | OUTPATIENT
Start: 2025-06-06

## 2025-06-06 RX ORDER — OMEPRAZOLE 20 MG/1
20 CAPSULE, DELAYED RELEASE ORAL 2 TIMES DAILY
Qty: 60 CAPSULE | Refills: 11 | Status: SHIPPED | OUTPATIENT
Start: 2025-06-06

## 2025-06-06 RX ORDER — LEVOTHYROXINE SODIUM 50 UG/1
50 TABLET ORAL DAILY
Qty: 30 TABLET | Refills: 11 | Status: SHIPPED | OUTPATIENT
Start: 2025-06-06

## 2025-06-06 RX ORDER — ATORVASTATIN CALCIUM 40 MG/1
40 TABLET, FILM COATED ORAL DAILY
Qty: 30 TABLET | Refills: 11 | Status: SHIPPED | OUTPATIENT
Start: 2025-06-06

## 2025-06-06 RX ORDER — METFORMIN HYDROCHLORIDE 1000 MG/1
1000 TABLET ORAL 2 TIMES DAILY
Qty: 60 TABLET | Refills: 11 | Status: SHIPPED | OUTPATIENT
Start: 2025-06-06

## 2025-06-11 ENCOUNTER — HOSPITAL ENCOUNTER (OUTPATIENT)
Dept: RADIOLOGY | Facility: HOSPITAL | Age: 65
Discharge: HOME | End: 2025-06-11
Attending: INTERNAL MEDICINE
Payer: MEDICARE

## 2025-06-11 DIAGNOSIS — I65.23 BILATERAL CAROTID ARTERY STENOSIS: ICD-10-CM

## 2025-06-11 PROCEDURE — 93880 EXTRACRANIAL BILAT STUDY: CPT | Mod: TC

## 2025-07-03 ENCOUNTER — LAB VISIT (OUTPATIENT)
Dept: LAB | Facility: HOSPITAL | Age: 65
End: 2025-07-03
Attending: INTERNAL MEDICINE
Payer: MEDICARE

## 2025-07-03 DIAGNOSIS — D50.9 IRON DEFICIENCY ANEMIA, UNSPECIFIED IRON DEFICIENCY ANEMIA TYPE: ICD-10-CM

## 2025-07-03 LAB
BASOPHILS # BLD AUTO: 0.01 X10(3)/MCL
BASOPHILS NFR BLD AUTO: 0.2 %
EOSINOPHIL # BLD AUTO: 0.27 X10(3)/MCL (ref 0–0.9)
EOSINOPHIL NFR BLD AUTO: 4.3 %
ERYTHROCYTE [DISTWIDTH] IN BLOOD BY AUTOMATED COUNT: 16.5 % (ref 11.5–17)
FERRITIN SERPL-MCNC: 353.67 NG/ML (ref 21.81–274.66)
HCT VFR BLD AUTO: 31.9 % (ref 42–52)
HGB BLD-MCNC: 10.1 G/DL (ref 14–18)
IMM GRANULOCYTES # BLD AUTO: 0.02 X10(3)/MCL (ref 0–0.04)
IMM GRANULOCYTES NFR BLD AUTO: 0.3 %
IRON SATN MFR SERPL: 31 % (ref 20–50)
IRON SERPL-MCNC: 57 UG/DL (ref 65–175)
LYMPHOCYTES # BLD AUTO: 1.36 X10(3)/MCL (ref 0.6–4.6)
LYMPHOCYTES NFR BLD AUTO: 21.7 %
MCH RBC QN AUTO: 29.4 PG (ref 27–31)
MCHC RBC AUTO-ENTMCNC: 31.7 G/DL (ref 33–36)
MCV RBC AUTO: 93 FL (ref 80–94)
MONOCYTES # BLD AUTO: 0.74 X10(3)/MCL (ref 0.1–1.3)
MONOCYTES NFR BLD AUTO: 11.8 %
NEUTROPHILS # BLD AUTO: 3.86 X10(3)/MCL (ref 2.1–9.2)
NEUTROPHILS NFR BLD AUTO: 61.7 %
PLATELET # BLD AUTO: 140 X10(3)/MCL (ref 130–400)
PMV BLD AUTO: 10.4 FL (ref 7.4–10.4)
RBC # BLD AUTO: 3.43 X10(6)/MCL (ref 4.7–6.1)
TIBC SERPL-MCNC: 129 UG/DL (ref 60–240)
TIBC SERPL-MCNC: 186 UG/DL (ref 250–450)
TRANSFERRIN SERPL-MCNC: 168 MG/DL (ref 163–344)
WBC # BLD AUTO: 6.26 X10(3)/MCL (ref 4.5–11.5)

## 2025-07-03 PROCEDURE — 85025 COMPLETE CBC W/AUTO DIFF WBC: CPT

## 2025-07-03 PROCEDURE — 36415 COLL VENOUS BLD VENIPUNCTURE: CPT

## 2025-07-03 PROCEDURE — 83550 IRON BINDING TEST: CPT

## 2025-07-03 PROCEDURE — 82728 ASSAY OF FERRITIN: CPT

## 2025-07-10 ENCOUNTER — OFFICE VISIT (OUTPATIENT)
Dept: HEMATOLOGY/ONCOLOGY | Facility: CLINIC | Age: 65
End: 2025-07-10
Payer: MEDICARE

## 2025-07-10 VITALS
DIASTOLIC BLOOD PRESSURE: 74 MMHG | TEMPERATURE: 98 F | HEART RATE: 77 BPM | RESPIRATION RATE: 17 BRPM | OXYGEN SATURATION: 100 % | SYSTOLIC BLOOD PRESSURE: 116 MMHG | BODY MASS INDEX: 34.68 KG/M2 | WEIGHT: 208.13 LBS | HEIGHT: 65 IN

## 2025-07-10 DIAGNOSIS — E53.8 FOLIC ACID DEFICIENCY: Primary | ICD-10-CM

## 2025-07-10 DIAGNOSIS — D50.9 IRON DEFICIENCY ANEMIA, UNSPECIFIED IRON DEFICIENCY ANEMIA TYPE: ICD-10-CM

## 2025-07-10 PROCEDURE — 3008F BODY MASS INDEX DOCD: CPT | Mod: CPTII,S$GLB,, | Performed by: NURSE PRACTITIONER

## 2025-07-10 PROCEDURE — 99213 OFFICE O/P EST LOW 20 MIN: CPT | Mod: S$GLB,,, | Performed by: NURSE PRACTITIONER

## 2025-07-10 PROCEDURE — 3078F DIAST BP <80 MM HG: CPT | Mod: CPTII,S$GLB,, | Performed by: NURSE PRACTITIONER

## 2025-07-10 PROCEDURE — 4010F ACE/ARB THERAPY RXD/TAKEN: CPT | Mod: CPTII,S$GLB,, | Performed by: NURSE PRACTITIONER

## 2025-07-10 PROCEDURE — 3288F FALL RISK ASSESSMENT DOCD: CPT | Mod: CPTII,S$GLB,, | Performed by: NURSE PRACTITIONER

## 2025-07-10 PROCEDURE — 1160F RVW MEDS BY RX/DR IN RCRD: CPT | Mod: CPTII,S$GLB,, | Performed by: NURSE PRACTITIONER

## 2025-07-10 PROCEDURE — 99999 PR PBB SHADOW E&M-EST. PATIENT-LVL IV: CPT | Mod: PBBFAC,,, | Performed by: NURSE PRACTITIONER

## 2025-07-10 PROCEDURE — 3044F HG A1C LEVEL LT 7.0%: CPT | Mod: CPTII,S$GLB,, | Performed by: NURSE PRACTITIONER

## 2025-07-10 PROCEDURE — 1126F AMNT PAIN NOTED NONE PRSNT: CPT | Mod: CPTII,S$GLB,, | Performed by: NURSE PRACTITIONER

## 2025-07-10 PROCEDURE — 1159F MED LIST DOCD IN RCRD: CPT | Mod: CPTII,S$GLB,, | Performed by: NURSE PRACTITIONER

## 2025-07-10 PROCEDURE — 1101F PT FALLS ASSESS-DOCD LE1/YR: CPT | Mod: CPTII,S$GLB,, | Performed by: NURSE PRACTITIONER

## 2025-07-10 PROCEDURE — 3074F SYST BP LT 130 MM HG: CPT | Mod: CPTII,S$GLB,, | Performed by: NURSE PRACTITIONER

## 2025-07-10 NOTE — PROGRESS NOTES
"Subjective:       Patient ID: Aba Dunbar is a 65 y.o. male.    Chief Complaint: "I am having back surgery"    Diagnosis:  Iron deficiency anemia  Vitamin B12 deficiency     Current Treatment:  Folic Acid 1 mg; Injectafer 750 mg IV 4/17/25    Clinical History:  Patient was referred by his PCP for evaluation of anemia on routine laboratory.  CBC from 2/3/23 showed a hemoglobin of 8.8 and hematocrit 31.2 with an MCV of 79.8 and RDW 19.9.  WBC and platelet counts were normal.  Reticulocyte count was 1.6%.  Additional laboratory drawn 2/7/23 confirmed iron deficiency with a serum iron of 38, TIBC 348, saturation 10% and ferritin level 22 ng/mL.  Review of previous laboratory, shows longstanding anemia dating back at least 2 years.  He had symptoms of PICA for ice.  He denied any abdominal symptoms or complaints.  No weight loss.  No change in his bowel habits, no melena or hematochezia.  Screening colonoscopy approximately 6 months prior- small polyps removed.      He was seen as a new patient 2/15/23.  Treatment was recommended with ferrous sulfate 325 mg b.i.d. His anemia improved on treatment his PICA symptoms resolved.    EGD 5/18/23 showed mild gastritis and patchy erythema with ulceration about the lesser curvature of the stomach.  He was placed on Protonix.  Pathology showed no H pylori or evidence of malignancy.    Laboratory showed mild thrombocytopenia.  Anti-platelet antibodies were negative.    Interval History  Mr. Dunbar is here today by himself for a hematology follow-up visit.  He received Injectafer 750 mg IV on 4/17/25 for functional iron deficiency.  Treatment was well tolerated and he noted improvement in his energy level.  He stopped taking oral iron after receiving the Injectafer.  He is still taking folic acid as directed.  He had spinal surgery with Dr. Bo last month, that went well overall.  Laboratory for this visit shows stable anemia with a hemoglobin of 10.1.  Iron profile and " "ferritin level show interval improvement post iron replacement.    Review of Systems   Constitutional:  Negative for appetite change, fatigue, fever and unexpected weight change.   HENT:  Negative for mouth sores, sore throat and trouble swallowing.    Eyes: Negative.  Negative for visual disturbance.   Respiratory:  Negative for cough and shortness of breath.    Cardiovascular:  Negative for chest pain, palpitations and leg swelling.   Gastrointestinal:  Negative for abdominal distention, abdominal pain, blood in stool, constipation, diarrhea and nausea.   Genitourinary:  Negative for dysuria, frequency and urgency.   Musculoskeletal:  Positive for arthralgias and back pain (Chronic, lower back).   Integumentary:  Negative for pallor and rash.   Neurological:  Negative for dizziness, weakness, numbness and headaches.   Hematological:  Negative for adenopathy. Does not bruise/bleed easily.   Psychiatric/Behavioral: Negative.  The patient is not nervous/anxious.          PMHx:  HTN, hyperlipidemia, CAD, DM, hypothyroid, arthritis, Charcot-Abbie-Tooth disease, GERD, RLS  PSHx:  Tonsils, bilateral ankle fusions, CABG  SH:  Former smoker 1 ppd, quit age 46.  Occasional alcohol use.  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  FH:  His father had colon cancer.    Objective:        /74   Pulse 77   Temp 97.6 °F (36.4 °C)   Resp 17   Ht 5' 5" (1.651 m)   Wt 94.4 kg (208 lb 1.6 oz)   SpO2 100%   BMI 34.63 kg/m²    Physical Exam  Constitutional:       Appearance: He is ill-appearing (chronically).      Comments: Morbidly obese white male in [facility], ambulatory with a rolling walker   HENT:      Head: Normocephalic and atraumatic.      Mouth/Throat:      Mouth: Mucous membranes are moist.      Pharynx: Oropharynx is clear. No posterior oropharyngeal erythema.   Eyes:      General: No scleral icterus.     Extraocular Movements: Extraocular movements intact.      Conjunctiva/sclera: Conjunctivae normal. "   Cardiovascular:      Rate and Rhythm: Normal rate and regular rhythm.      Heart sounds: No murmur heard.     Comments: Well-healed sternotomy incision  Pulmonary:      Effort: Pulmonary effort is normal.      Breath sounds: Normal breath sounds.   Abdominal:      General: Bowel sounds are normal. There is no distension.      Palpations: Abdomen is soft. There is no mass.      Tenderness: There is no abdominal tenderness.      Comments: Obese.   Musculoskeletal:         General: No swelling or tenderness. Normal range of motion.      Cervical back: Neck supple. No tenderness.   Lymphadenopathy:      Cervical: No cervical adenopathy.   Skin:     General: Skin is warm and dry.      Findings: No bruising or rash.   Neurological:      General: No focal deficit present.      Mental Status: He is alert and oriented to person, place, and time.      Cranial Nerves: No cranial nerve deficit.      Motor: No weakness.            LABORATORY     Latest Reference Range & Units 07/03/25 11:03   WBC 4.50 - 11.50 x10(3)/mcL 6.26   RBC 4.70 - 6.10 x10(6)/mcL 3.43 (L)   Hemoglobin 14.0 - 18.0 g/dL 10.1 (L)   Hematocrit 42.0 - 52.0 % 31.9 (L)   MCV 80.0 - 94.0 fL 93.0   MCH 27.0 - 31.0 pg 29.4   MCHC 33.0 - 36.0 g/dL 31.7 (L)   RDW 11.5 - 17.0 % 16.5   Platelet Count 130 - 400 x10(3)/mcL 140   MPV 7.4 - 10.4 fL 10.4   Neut % % 61.7   LYMPH % % 21.7   Mono % % 11.8   Eos % % 4.3   Basophil % % 0.2   Immature Granulocytes % 0.3   Neut # 2.1 - 9.2 x10(3)/mcL 3.86   Lymph # 0.6 - 4.6 x10(3)/mcL 1.36   Mono # 0.1 - 1.3 x10(3)/mcL 0.74   Eos # 0 - 0.9 x10(3)/mcL 0.27   Baso # <=0.2 x10(3)/mcL 0.01   Immature Grans (Abs) 0.00 - 0.04 x10(3)/mcL 0.02   Iron 65 - 175 ug/dL 57 (L)   TIBC 250 - 450 ug/dL 186 (L)   UIBC 60 - 240 ug/dL 129   Transferrin 163 - 344 mg/dL 168   Ferritin 21.81 - 274.66 ng/mL 353.67 (H)   Iron Saturation 20 - 50 % 31   (L): Data is abnormally low  (H): Data is abnormally high      Assessment:   Iron deficiency anemia    Vitamin B12 deficiency - resolved      Plan:   Iron studies are improved following IV iron replacement.  He has stable, residual anemia, which is multifactorial (CKD, DM).  There is no indication for additional oral or IV iron replacement at this time.  Follow-up in 6 months with CBC and ferritin level.  All questions answered.    RADHA PENALOZA, FNP-C  [facility] [place]       Other Physicians  Dr. Ventura Erickson

## 2025-07-19 DIAGNOSIS — M50.90 CERVICAL DISC DISEASE: ICD-10-CM

## 2025-07-19 DIAGNOSIS — M51.9 LUMBAR DISC DISEASE: ICD-10-CM

## 2025-07-21 RX ORDER — GABAPENTIN 100 MG/1
100 CAPSULE ORAL
Qty: 30 CAPSULE | Refills: 1 | Status: SHIPPED | OUTPATIENT
Start: 2025-07-21

## 2025-08-05 ENCOUNTER — HOSPITAL ENCOUNTER (OUTPATIENT)
Dept: RADIOLOGY | Facility: HOSPITAL | Age: 65
Discharge: HOME | End: 2025-08-05
Attending: PODIATRIST
Payer: MEDICARE

## 2025-08-05 DIAGNOSIS — M86.172 ACUTE OSTEOMYELITIS OF LEFT ANKLE OR FOOT: ICD-10-CM

## 2025-08-05 DIAGNOSIS — M86.172 OSTEOMYELITIS OF ANKLE OR FOOT, ACUTE, LEFT: Primary | ICD-10-CM

## 2025-08-05 DIAGNOSIS — L97.522 ULCER OF LEFT FOOT, WITH FAT LAYER EXPOSED: ICD-10-CM

## 2025-08-05 PROCEDURE — 73702 CT LWR EXTREMITY W/O&W/DYE: CPT | Mod: TC,LT

## 2025-08-05 PROCEDURE — 25500020 PHARM REV CODE 255

## 2025-08-05 RX ORDER — IOPAMIDOL 755 MG/ML
100 INJECTION, SOLUTION INTRAVASCULAR
Status: COMPLETED | OUTPATIENT
Start: 2025-08-05 | End: 2025-08-05

## 2025-08-05 RX ADMIN — IOPAMIDOL 100 ML: 755 INJECTION, SOLUTION INTRAVENOUS at 09:08

## 2025-08-27 ENCOUNTER — OFFICE VISIT (OUTPATIENT)
Dept: INFECTIOUS DISEASES | Facility: CLINIC | Age: 65
End: 2025-08-27
Payer: MEDICARE

## 2025-08-27 VITALS
RESPIRATION RATE: 20 BRPM | OXYGEN SATURATION: 100 % | HEART RATE: 98 BPM | SYSTOLIC BLOOD PRESSURE: 123 MMHG | WEIGHT: 207.81 LBS | DIASTOLIC BLOOD PRESSURE: 70 MMHG | TEMPERATURE: 99 F | BODY MASS INDEX: 34.62 KG/M2 | HEIGHT: 65 IN

## 2025-08-27 DIAGNOSIS — Z79.2 CHRONIC ANTIBIOTIC SUPPRESSION: ICD-10-CM

## 2025-08-27 DIAGNOSIS — M86.372 CHRONIC MULTIFOCAL OSTEOMYELITIS, LEFT ANKLE AND FOOT: ICD-10-CM

## 2025-08-27 DIAGNOSIS — M86.679 CHRONIC OSTEOMYELITIS INVOLVING ANKLE AND FOOT, UNSPECIFIED LATERALITY: Primary | ICD-10-CM

## 2025-08-27 PROCEDURE — 99999 PR PBB SHADOW E&M-EST. PATIENT-LVL V: CPT | Mod: PBBFAC,,,

## 2025-08-27 RX ORDER — CIPROFLOXACIN 500 MG/1
500 TABLET, FILM COATED ORAL 2 TIMES DAILY
Qty: 60 TABLET | Refills: 2 | Status: SHIPPED | OUTPATIENT
Start: 2025-09-07 | End: 2025-12-06

## 2025-08-27 RX ORDER — DOXYCYCLINE HYCLATE 100 MG
100 TABLET ORAL 2 TIMES DAILY
Qty: 60 TABLET | Refills: 2 | Status: SHIPPED | OUTPATIENT
Start: 2025-09-07 | End: 2025-12-06